# Patient Record
Sex: MALE | Race: WHITE | NOT HISPANIC OR LATINO | URBAN - METROPOLITAN AREA
[De-identification: names, ages, dates, MRNs, and addresses within clinical notes are randomized per-mention and may not be internally consistent; named-entity substitution may affect disease eponyms.]

---

## 2017-12-11 ENCOUNTER — INPATIENT (INPATIENT)
Facility: HOSPITAL | Age: 82
LOS: 2 days | Discharge: NURSING, REHAB OR LONG-TERM CARE | End: 2017-12-14
Attending: INTERNAL MEDICINE | Admitting: INTERNAL MEDICINE
Payer: MEDICARE

## 2017-12-11 VITALS
DIASTOLIC BLOOD PRESSURE: 86 MMHG | WEIGHT: 149.91 LBS | RESPIRATION RATE: 18 BRPM | HEART RATE: 71 BPM | OXYGEN SATURATION: 100 % | SYSTOLIC BLOOD PRESSURE: 150 MMHG | TEMPERATURE: 99 F | HEIGHT: 71 IN

## 2017-12-11 DIAGNOSIS — Z98.42 CATARACT EXTRACTION STATUS, LEFT EYE: Chronic | ICD-10-CM

## 2017-12-11 LAB
ANION GAP SERPL CALC-SCNC: 4 MMOL/L — LOW (ref 5–17)
BASOPHILS # BLD AUTO: 0.1 K/UL — SIGNIFICANT CHANGE UP (ref 0–0.2)
BASOPHILS NFR BLD AUTO: 0.6 % — SIGNIFICANT CHANGE UP (ref 0–2)
BUN SERPL-MCNC: 50 MG/DL — HIGH (ref 7–23)
CALCIUM SERPL-MCNC: 9.5 MG/DL — SIGNIFICANT CHANGE UP (ref 8.5–10.1)
CHLORIDE SERPL-SCNC: 109 MMOL/L — HIGH (ref 96–108)
CO2 SERPL-SCNC: 27 MMOL/L — SIGNIFICANT CHANGE UP (ref 22–31)
CREAT SERPL-MCNC: 1.43 MG/DL — HIGH (ref 0.5–1.3)
EOSINOPHIL # BLD AUTO: 0 K/UL — SIGNIFICANT CHANGE UP (ref 0–0.5)
EOSINOPHIL NFR BLD AUTO: 0.3 % — SIGNIFICANT CHANGE UP (ref 0–6)
GLUCOSE SERPL-MCNC: 120 MG/DL — HIGH (ref 70–99)
HCT VFR BLD CALC: 38.1 % — LOW (ref 39–50)
HGB BLD-MCNC: 12.7 G/DL — LOW (ref 13–17)
INR BLD: 1.14 RATIO — SIGNIFICANT CHANGE UP (ref 0.88–1.16)
LYMPHOCYTES # BLD AUTO: 1.9 K/UL — SIGNIFICANT CHANGE UP (ref 1–3.3)
LYMPHOCYTES # BLD AUTO: 20.6 % — SIGNIFICANT CHANGE UP (ref 13–44)
MCHC RBC-ENTMCNC: 32.1 PG — SIGNIFICANT CHANGE UP (ref 27–34)
MCHC RBC-ENTMCNC: 33.4 GM/DL — SIGNIFICANT CHANGE UP (ref 32–36)
MCV RBC AUTO: 96.1 FL — SIGNIFICANT CHANGE UP (ref 80–100)
MONOCYTES # BLD AUTO: 0.7 K/UL — SIGNIFICANT CHANGE UP (ref 0–0.9)
MONOCYTES NFR BLD AUTO: 7.8 % — SIGNIFICANT CHANGE UP (ref 2–14)
NEUTROPHILS # BLD AUTO: 6.5 K/UL — SIGNIFICANT CHANGE UP (ref 1.8–7.4)
NEUTROPHILS NFR BLD AUTO: 70.8 % — SIGNIFICANT CHANGE UP (ref 43–77)
PLATELET # BLD AUTO: 232 K/UL — SIGNIFICANT CHANGE UP (ref 150–400)
POTASSIUM SERPL-MCNC: 5.2 MMOL/L — SIGNIFICANT CHANGE UP (ref 3.5–5.3)
POTASSIUM SERPL-SCNC: 5.2 MMOL/L — SIGNIFICANT CHANGE UP (ref 3.5–5.3)
PROTHROM AB SERPL-ACNC: 12.4 SEC — SIGNIFICANT CHANGE UP (ref 9.8–12.7)
RBC # BLD: 3.96 M/UL — LOW (ref 4.2–5.8)
RBC # FLD: 13 % — SIGNIFICANT CHANGE UP (ref 10.3–14.5)
SODIUM SERPL-SCNC: 140 MMOL/L — SIGNIFICANT CHANGE UP (ref 135–145)
WBC # BLD: 9.1 K/UL — SIGNIFICANT CHANGE UP (ref 3.8–10.5)
WBC # FLD AUTO: 9.1 K/UL — SIGNIFICANT CHANGE UP (ref 3.8–10.5)

## 2017-12-11 PROCEDURE — 71111 X-RAY EXAM RIBS/CHEST4/> VWS: CPT | Mod: 26

## 2017-12-11 PROCEDURE — 76377 3D RENDER W/INTRP POSTPROCES: CPT | Mod: 26

## 2017-12-11 PROCEDURE — 70486 CT MAXILLOFACIAL W/O DYE: CPT | Mod: 26

## 2017-12-11 PROCEDURE — 99285 EMERGENCY DEPT VISIT HI MDM: CPT

## 2017-12-11 PROCEDURE — 72190 X-RAY EXAM OF PELVIS: CPT | Mod: 26

## 2017-12-11 PROCEDURE — 93010 ELECTROCARDIOGRAM REPORT: CPT

## 2017-12-11 PROCEDURE — 70450 CT HEAD/BRAIN W/O DYE: CPT | Mod: 26

## 2017-12-11 NOTE — ED ADULT NURSE REASSESSMENT NOTE - GENERAL PATIENT STATE
comfortable appearance/smiling/interactive/cooperative
cooperative/comfortable appearance/family/SO at bedside

## 2017-12-11 NOTE — ED ADULT NURSE NOTE - OBJECTIVE STATEMENT
presents to the ED s/p 2 falls; saturday/sunday. sent by PMD for further evaluation. c/o left sided rib pain. denies syncope. states that he lost his balance and fell. denies anticoagulation.

## 2017-12-11 NOTE — ED ADULT NURSE REASSESSMENT NOTE - COMFORT CARE
9pm rounding complete, no other assistance needed
meal provided/po fluids offered/ham sandwich; gingerale
hourly rounding completed/repositioned/side rails up
repositioned/meal provided/po fluids offered/side rails down
plan of care explained/wait time explained

## 2017-12-11 NOTE — ED ADULT TRIAGE NOTE - CHIEF COMPLAINT QUOTE
Patient comes to ED for fall on saturday. pt tripped and fell, denies LOC. pt complaining of left sided pain. denies blood thinner

## 2017-12-11 NOTE — ED PROVIDER NOTE - NS_ ATTENDINGSCRIBEDETAILS _ED_A_ED_FT
I, Fan Alvarez MD,  performed the initial face to face bedside interview with this patient regarding history of present illness, review of symptoms and relevant past medical, social and family history.  I completed an independent physical examination.    The history, relevant review of systems, past medical and surgical history, medical decision making, and physical examination was documented by the scribe in my presence and I attest to the accuracy of the documentation.

## 2017-12-11 NOTE — ED ADULT NURSE REASSESSMENT NOTE - NS ED NURSE REASSESS COMMENT FT1
MTastephon Alonzo at bedside, EKG to be performed.
Reinforced numerous times with pt and wife that case management will be at bedside when done addressing previous patients. Not very receptive, will continue to educate and reinforce.

## 2017-12-11 NOTE — ED PROVIDER NOTE - MUSCULOSKELETAL, MLM
Spine appears normal, range of motion is not limited, no muscle or joint tenderness. +tenderness to palpitation to left lower ribs

## 2017-12-12 DIAGNOSIS — E78.00 PURE HYPERCHOLESTEROLEMIA, UNSPECIFIED: ICD-10-CM

## 2017-12-12 DIAGNOSIS — I10 ESSENTIAL (PRIMARY) HYPERTENSION: ICD-10-CM
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
## 2017-12-12 LAB
ALBUMIN SERPL ELPH-MCNC: 3 G/DL — LOW (ref 3.3–5)
ALP SERPL-CCNC: 103 U/L — SIGNIFICANT CHANGE UP (ref 40–120)
ALT FLD-CCNC: 21 U/L — SIGNIFICANT CHANGE UP (ref 12–78)
ANION GAP SERPL CALC-SCNC: 8 MMOL/L — SIGNIFICANT CHANGE UP (ref 5–17)
AST SERPL-CCNC: 65 U/L — HIGH (ref 15–37)
BASOPHILS # BLD AUTO: 0.1 K/UL — SIGNIFICANT CHANGE UP (ref 0–0.2)
BASOPHILS NFR BLD AUTO: 0.8 % — SIGNIFICANT CHANGE UP (ref 0–2)
BILIRUB SERPL-MCNC: 0.8 MG/DL — SIGNIFICANT CHANGE UP (ref 0.2–1.2)
BUN SERPL-MCNC: 43 MG/DL — HIGH (ref 7–23)
CALCIUM SERPL-MCNC: 9.2 MG/DL — SIGNIFICANT CHANGE UP (ref 8.5–10.1)
CHLORIDE SERPL-SCNC: 111 MMOL/L — HIGH (ref 96–108)
CO2 SERPL-SCNC: 25 MMOL/L — SIGNIFICANT CHANGE UP (ref 22–31)
CREAT SERPL-MCNC: 1.19 MG/DL — SIGNIFICANT CHANGE UP (ref 0.5–1.3)
EOSINOPHIL # BLD AUTO: 0.1 K/UL — SIGNIFICANT CHANGE UP (ref 0–0.5)
EOSINOPHIL NFR BLD AUTO: 1.1 % — SIGNIFICANT CHANGE UP (ref 0–6)
GLUCOSE SERPL-MCNC: 91 MG/DL — SIGNIFICANT CHANGE UP (ref 70–99)
HCT VFR BLD CALC: 35.5 % — LOW (ref 39–50)
HGB BLD-MCNC: 11.8 G/DL — LOW (ref 13–17)
LYMPHOCYTES # BLD AUTO: 1.6 K/UL — SIGNIFICANT CHANGE UP (ref 1–3.3)
LYMPHOCYTES # BLD AUTO: 22.4 % — SIGNIFICANT CHANGE UP (ref 13–44)
MAGNESIUM SERPL-MCNC: 1.9 MG/DL — SIGNIFICANT CHANGE UP (ref 1.6–2.6)
MCHC RBC-ENTMCNC: 32.5 PG — SIGNIFICANT CHANGE UP (ref 27–34)
MCHC RBC-ENTMCNC: 33.1 GM/DL — SIGNIFICANT CHANGE UP (ref 32–36)
MCV RBC AUTO: 98 FL — SIGNIFICANT CHANGE UP (ref 80–100)
MONOCYTES # BLD AUTO: 0.6 K/UL — SIGNIFICANT CHANGE UP (ref 0–0.9)
MONOCYTES NFR BLD AUTO: 8.9 % — SIGNIFICANT CHANGE UP (ref 2–14)
NEUTROPHILS # BLD AUTO: 4.6 K/UL — SIGNIFICANT CHANGE UP (ref 1.8–7.4)
NEUTROPHILS NFR BLD AUTO: 66.9 % — SIGNIFICANT CHANGE UP (ref 43–77)
PLATELET # BLD AUTO: 221 K/UL — SIGNIFICANT CHANGE UP (ref 150–400)
POTASSIUM SERPL-MCNC: 4.4 MMOL/L — SIGNIFICANT CHANGE UP (ref 3.5–5.3)
POTASSIUM SERPL-SCNC: 4.4 MMOL/L — SIGNIFICANT CHANGE UP (ref 3.5–5.3)
PROT SERPL-MCNC: 6.8 GM/DL — SIGNIFICANT CHANGE UP (ref 6–8.3)
RBC # BLD: 3.62 M/UL — LOW (ref 4.2–5.8)
RBC # FLD: 13 % — SIGNIFICANT CHANGE UP (ref 10.3–14.5)
SODIUM SERPL-SCNC: 144 MMOL/L — SIGNIFICANT CHANGE UP (ref 135–145)
WBC # BLD: 6.9 K/UL — SIGNIFICANT CHANGE UP (ref 3.8–10.5)
WBC # FLD AUTO: 6.9 K/UL — SIGNIFICANT CHANGE UP (ref 3.8–10.5)

## 2017-12-12 PROCEDURE — 70553 MRI BRAIN STEM W/O & W/DYE: CPT | Mod: 26

## 2017-12-12 PROCEDURE — 72141 MRI NECK SPINE W/O DYE: CPT | Mod: 26

## 2017-12-12 RX ORDER — ONDANSETRON 8 MG/1
4 TABLET, FILM COATED ORAL EVERY 6 HOURS
Qty: 0 | Refills: 0 | Status: DISCONTINUED | OUTPATIENT
Start: 2017-12-12 | End: 2017-12-14

## 2017-12-12 RX ORDER — SENNA PLUS 8.6 MG/1
2 TABLET ORAL AT BEDTIME
Qty: 0 | Refills: 0 | Status: DISCONTINUED | OUTPATIENT
Start: 2017-12-12 | End: 2017-12-14

## 2017-12-12 RX ORDER — DOCUSATE SODIUM 100 MG
100 CAPSULE ORAL THREE TIMES A DAY
Qty: 0 | Refills: 0 | Status: DISCONTINUED | OUTPATIENT
Start: 2017-12-12 | End: 2017-12-14

## 2017-12-12 RX ORDER — METOPROLOL TARTRATE 50 MG
100 TABLET ORAL DAILY
Qty: 0 | Refills: 0 | Status: DISCONTINUED | OUTPATIENT
Start: 2017-12-12 | End: 2017-12-14

## 2017-12-12 RX ORDER — SODIUM CHLORIDE 9 MG/ML
1000 INJECTION INTRAMUSCULAR; INTRAVENOUS; SUBCUTANEOUS
Qty: 0 | Refills: 0 | Status: DISCONTINUED | OUTPATIENT
Start: 2017-12-12 | End: 2017-12-13

## 2017-12-12 RX ORDER — LISINOPRIL 2.5 MG/1
40 TABLET ORAL DAILY
Qty: 0 | Refills: 0 | Status: DISCONTINUED | OUTPATIENT
Start: 2017-12-12 | End: 2017-12-14

## 2017-12-12 RX ORDER — AMLODIPINE BESYLATE 2.5 MG/1
10 TABLET ORAL DAILY
Qty: 0 | Refills: 0 | Status: DISCONTINUED | OUTPATIENT
Start: 2017-12-12 | End: 2017-12-14

## 2017-12-12 RX ORDER — ACETAMINOPHEN 500 MG
650 TABLET ORAL EVERY 6 HOURS
Qty: 0 | Refills: 0 | Status: DISCONTINUED | OUTPATIENT
Start: 2017-12-12 | End: 2017-12-14

## 2017-12-12 RX ADMIN — LISINOPRIL 40 MILLIGRAM(S): 2.5 TABLET ORAL at 05:45

## 2017-12-12 RX ADMIN — Medication 100 MILLIGRAM(S): at 05:45

## 2017-12-12 RX ADMIN — AMLODIPINE BESYLATE 10 MILLIGRAM(S): 2.5 TABLET ORAL at 05:45

## 2017-12-12 NOTE — H&P ADULT - HISTORY OF PRESENT ILLNESS
89 y/o M PMHx significant for HTN, and hyperlipidemia presents to the ED for further evaluation of recurrent mechanical falls at home. The patient had two recent falls at home each on 12/9 and 12/10 and each with falling onto his left side. Denies head trauma or LOC. The patient states that he uses a cane or walker to   ambulate but that he loses his balance often as has become unsteady on his "feet." In the ED the patient was   noted to have multiple abrasions and small lacerations on his legs and nose. Trauma w/u in the ED included   XRs of the B/L ribs, Chest, and Pelvis all unrevealing for an acute fracture. CT Head/Maxillofacial revealed -> 1)an irregular bony thickening and hyperostosis of the right superomedial orbital wall with adjacent stippled hyperdensity which may reflect a primary benign bony neoplasm versus meningioma with adjacent hyperostosis and 2) There is a small area of hypoattenuation within the adjacent right inferior frontal lobe  not present on the prior exam, may reflect developing secondary vasogenic edema.

## 2017-12-12 NOTE — H&P ADULT - ASSESSMENT
89 y/o M PMHx significant for HTN, and hyperlipidemia presents to the ED for further evaluation of recurrent   mechanical falls at home. The patient had two recent falls at home each on 12/9 and 12/10 and each with   falling onto his left side. Denies head trauma or LOC. The patient states that he uses a cane or walker to   ambulate but that he loses his balance often as has become unsteady on his "feet." In the ED the patient was   noted to have multiple abrasions and small lacerations on his legs and nose. Trauma w/u in the ED included   XRs of the B/L ribs, Chest, and Pelvis all unrevealing for an acute fracture. CT Head/Maxillofacial revealed -> 1)an irregular bony thickening and hyperostosis of the right superomedial orbital wall with adjacent stippled hyperdensity which may reflect a primary benign bony neoplasm versus meningioma with adjacent hyperostosis and 2) There is a small area of hypoattenuation within the adjacent right inferior frontal lobe  not present on the prior exam, may reflect developing secondary vasogenic edema. 89 y/o M PMHx significant for HTN, and hyperlipidemia presents to the ED for further evaluation of recurrent   mechanical falls at home. The patient had two recent falls at home each on 12/9 and 12/10 and each with   falling onto his left side. Denies head trauma or LOC. The patient states that he uses a cane or walker to   ambulate but that he loses his balance often as has become unsteady on his "feet." In the ED the patient was   noted to have multiple abrasions and small lacerations on his legs and nose. Trauma w/u in the ED included   XRs of the B/L ribs, Chest, and Pelvis all unrevealing for an acute fracture. CT Head/Maxillofacial revealed -> 1)an irregular bony thickening and hyperostosis of the right superomedial orbital wall with adjacent stippled hyperdensity which may reflect a primary benign bony neoplasm versus meningioma with adjacent hyperostosis and 2) There is a small area of hypoattenuation within the adjacent right inferior frontal lobe  not present on the prior exam, may reflect developing secondary vasogenic edema.    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 Amlodipine 10mg po daily  ~cont. Lisinopril 40mg po daily  ~cont. Metoprolol XL 100mg po daily    4)Vte ppx  ~cont. SCDs for now

## 2017-12-12 NOTE — PHYSICAL THERAPY INITIAL EVALUATION ADULT - PHYSICAL ASSIST/NONPHYSICAL ASSIST: GAIT, REHAB EVAL
1 person assist/LOB noted x3 w/ posterior lean. ? decreased proprioception of LLE. min A to maintain midline following LOB/nonverbal cues (demo/gestures)

## 2017-12-12 NOTE — CONSULT NOTE ADULT - SUBJECTIVE AND OBJECTIVE BOX
HPI:  89 y/o M PMHx significant for HTN, and hyperlipidemia presents to the ED for further evaluation of recurrent mechanical falls at home. The patient had two recent falls at home each on 12/9 and 12/10 and each with falling onto his left side. Denies head trauma or LOC. The patient states that he uses a cane or walker to   ambulate but that he loses his balance often as has become unsteady on his "feet." In the ED the patient was   noted to have multiple abrasions and small lacerations on his legs and nose. Trauma w/u in the ED included   XRs of the B/L ribs, Chest, and Pelvis all unrevealing for an acute fracture. CT Head/Maxillofacial revealed -> 1)an irregular bony thickening and hyperostosis of the right superomedial orbital wall with adjacent stippled hyperdensity which may reflect a primary benign bony neoplasm versus meningioma with adjacent hyperostosis and 2) There is a small area of hypoattenuation within the adjacent right inferior frontal lobe  not present on the prior exam, may reflect developing secondary vasogenic edema. (12 Dec 2017 00:06)      PAST MEDICAL & SURGICAL HISTORY:  Hypercholesterolemia  Hypertension  Cataract extraction status of eye, left      Allergies    No Known Allergies    Intolerances        MEDICATIONS  (STANDING):  amLODIPine   Tablet 10 milliGRAM(s) Oral daily  lisinopril 40 milliGRAM(s) Oral daily  metoprolol succinate  milliGRAM(s) Oral daily    MEDICATIONS  (PRN):  acetaminophen   Tablet 650 milliGRAM(s) Oral every 6 hours PRN For Temp greater than 38 C (100.4 F)  acetaminophen   Tablet. 650 milliGRAM(s) Oral every 6 hours PRN Mild Pain (1 - 3)  docusate sodium 100 milliGRAM(s) Oral three times a day PRN Constipation  ondansetron Injectable 4 milliGRAM(s) IV Push every 6 hours PRN Nausea  senna 2 Tablet(s) Oral at bedtime PRN Constipation      SOCIAL HISTORY:    FAMILY HISTORY:  No pertinent family history in first degree relatives      Vital Signs Last 24 Hrs  T(C): 37 (12 Dec 2017 03:04), Max: 37.2 (11 Dec 2017 15:13)  T(F): 98.6 (12 Dec 2017 03:04), Max: 99 (11 Dec 2017 15:13)  HR: 71 (12 Dec 2017 03:04) (68 - 75)  BP: 162/90 (12 Dec 2017 03:04) (127/79 - 162/90)  BP(mean): --  RR: 16 (12 Dec 2017 03:04) (16 - 18)  SpO2: 100% (12 Dec 2017 03:04) (100% - 100%)    LABS:                        12.7   9.1   )-----------( 232      ( 11 Dec 2017 21:12 )             38.1     12-11    140  |  109<H>  |  50<H>  ----------------------------<  120<H>  5.2   |  27  |  1.43<H>    Ca    9.5      11 Dec 2017 21:12      PT/INR - ( 11 Dec 2017 21:12 )   PT: 12.4 sec;   INR: 1.14 ratio               RADIOLOGY & ADDITIONAL STUDIES:  ~~~~~~~~~~~~~~~~~~~~~~~~~~~~~~  EXAM:  CT BRAIN                            PROCEDURE DATE:  12/11/2017          INTERPRETATION:  Exam Date: 12/11/2017 3:55 PM    CT head without IV contrast    CLINICAL INFORMATION:  Altered mental status        TECHNIQUE: Contiguous axial sections were obtained through the head.     This scan was performed using automatic exposure control (radiation dose   reduction software) to obtain a diagnostic image quality scan with   patient dose as low as reasonably achievable.      COMPARISON: CT head January 29, 2014     FINDINGS:     There is irregular bony thickening and hyperostosis of the right   superomedial orbital wall with adjacent stippled hyperdensity, not   significantly changed in appearance since prior exam of 2014, may reflect   a primary benign bony neoplasm versus meningioma with adjacent   hyperostosis. There is a small area of hypoattenuation within the   adjacent right inferior frontal lobe  , not present on the prior exam,   may reflect developing secondary vasogenic edema. Further evaluation with   MRI brain with IV contrast is recommended.    Rest of the exam is unremarkable without evidence of acute infarct,   hemorrhage, mass effect, or hydrocephalus.    IMPRESSION:       Irregular bony thickening and hyperostosisof the right superomedial   orbital wall with adjacent stippled hyperdensity, not significantly   changed in appearance since prior exam of 2014, may reflect a primary   benign bony neoplasm versus meningioma with adjacent hyperostosis. There   is a small area of hypoattenuation within the adjacent right inferior   frontal lobe  , not present on the prior exam, may reflect developing   secondary vasogenic edema. Further evaluation with MRI brain with IV   contrast is recommended.    SHARMILA MENEDZ M.D., ATTENDING RADIOLOGIST  This document has been electronically signed. Dec 11 2017  4:20PM HPI:  89 y/o M PMHx significant for HTN, and hyperlipidemia presents to the ED for further evaluation of recurrent mechanical falls at home. The patient had two recent falls at home each on 12/9 and 12/10 and each with falling onto his left side. Denies head trauma or LOC. The patient states that he uses a cane or walker to   ambulate but that he loses his balance often as has become unsteady on his "feet." In the ED the patient was   noted to have multiple abrasions and small lacerations on his legs and nose. Trauma w/u in the ED included   XRs of the B/L ribs, Chest, and Pelvis all unrevealing for an acute fracture. CT Head/Maxillofacial revealed -> 1)an irregular bony thickening and hyperostosis of the right superomedial orbital wall with adjacent stippled hyperdensity which may reflect a primary benign bony neoplasm versus meningioma with adjacent hyperostosis and 2) There is a small area of hypoattenuation within the adjacent right inferior frontal lobe  not present on the prior exam, may reflect developing secondary vasogenic edema. (12 Dec 2017 00:06)  Pt seen and examined states he does have a difficulty handling things at times, and does drop some objects, denies any b/b incontinence.     PAST MEDICAL & SURGICAL HISTORY:  Hypercholesterolemia  Hypertension  Cataract extraction status of eye, left      Allergies    No Known Allergies    Intolerances        MEDICATIONS  (STANDING):  amLODIPine   Tablet 10 milliGRAM(s) Oral daily  lisinopril 40 milliGRAM(s) Oral daily  metoprolol succinate  milliGRAM(s) Oral daily    MEDICATIONS  (PRN):  acetaminophen   Tablet 650 milliGRAM(s) Oral every 6 hours PRN For Temp greater than 38 C (100.4 F)  acetaminophen   Tablet. 650 milliGRAM(s) Oral every 6 hours PRN Mild Pain (1 - 3)  docusate sodium 100 milliGRAM(s) Oral three times a day PRN Constipation  ondansetron Injectable 4 milliGRAM(s) IV Push every 6 hours PRN Nausea  senna 2 Tablet(s) Oral at bedtime PRN Constipation      SOCIAL HISTORY:    FAMILY HISTORY:  No pertinent family history in first degree relatives      Vital Signs Last 24 Hrs  T(C): 37 (12 Dec 2017 03:04), Max: 37.2 (11 Dec 2017 15:13)  T(F): 98.6 (12 Dec 2017 03:04), Max: 99 (11 Dec 2017 15:13)  HR: 71 (12 Dec 2017 03:04) (68 - 75)  BP: 162/90 (12 Dec 2017 03:04) (127/79 - 162/90)  BP(mean): --  RR: 16 (12 Dec 2017 03:04) (16 - 18)  SpO2: 100% (12 Dec 2017 03:04) (100% - 100%)    LABS:                        12.7   9.1   )-----------( 232      ( 11 Dec 2017 21:12 )             38.1     12-11    140  |  109<H>  |  50<H>  ----------------------------<  120<H>  5.2   |  27  |  1.43<H>    Ca    9.5      11 Dec 2017 21:12      PT/INR - ( 11 Dec 2017 21:12 )   PT: 12.4 sec;   INR: 1.14 ratio               RADIOLOGY & ADDITIONAL STUDIES:  ~~~~~~~~~~~~~~~~~~~~~~~~~~~~~~  EXAM:  CT BRAIN                            PROCEDURE DATE:  12/11/2017          INTERPRETATION:  Exam Date: 12/11/2017 3:55 PM    CT head without IV contrast    CLINICAL INFORMATION:  Altered mental status        TECHNIQUE: Contiguous axial sections were obtained through the head.     This scan was performed using automatic exposure control (radiation dose   reduction software) to obtain a diagnostic image quality scan with   patient dose as low as reasonably achievable.      COMPARISON: CT head January 29, 2014     FINDINGS:     There is irregular bony thickening and hyperostosis of the right   superomedial orbital wall with adjacent stippled hyperdensity, not   significantly changed in appearance since prior exam of 2014, may reflect   a primary benign bony neoplasm versus meningioma with adjacent   hyperostosis. There is a small area of hypoattenuation within the   adjacent right inferior frontal lobe  , not present on the prior exam,   may reflect developing secondary vasogenic edema. Further evaluation with   MRI brain with IV contrast is recommended.    Rest of the exam is unremarkable without evidence of acute infarct,   hemorrhage, mass effect, or hydrocephalus.    IMPRESSION:       Irregular bony thickening and hyperostosisof the right superomedial   orbital wall with adjacent stippled hyperdensity, not significantly   changed in appearance since prior exam of 2014, may reflect a primary   benign bony neoplasm versus meningioma with adjacent hyperostosis. There   is a small area of hypoattenuation within the adjacent right inferior   frontal lobe  , not present on the prior exam, may reflect developing   secondary vasogenic edema. Further evaluation with MRI brain with IV   contrast is recommended.    SHARMILA MENDEZ M.D., ATTENDING RADIOLOGIST  This document has been electronically signed. Dec 11 2017  4:20PM HPI:  87 y/o M PMHx significant for HTN, and hyperlipidemia presents to the ED for further evaluation of recurrent mechanical falls at home. The patient had two recent falls at home each on 12/9 and 12/10 and each with falling onto his left side. Denies head trauma or LOC. The patient states that he uses a cane or walker to   ambulate but that he loses his balance often as has become unsteady on his "feet." In the ED the patient was   noted to have multiple abrasions and small lacerations on his legs and nose. Trauma w/u in the ED included   XRs of the B/L ribs, Chest, and Pelvis all unrevealing for an acute fracture. CT Head/Maxillofacial revealed -> 1)an irregular bony thickening and hyperostosis of the right superomedial orbital wall with adjacent stippled hyperdensity which may reflect a primary benign bony neoplasm versus meningioma with adjacent hyperostosis and 2) There is a small area of hypoattenuation within the adjacent right inferior frontal lobe  not present on the prior exam, may reflect developing secondary vasogenic edema. (12 Dec 2017 00:06)  Pt seen and examined states he has been falling more frequently and he does have a difficulty handling things with his hands at times, admits to does drop some objects, denies any b/b incontinence. Pt states he does get paresthesias in his hands on/ off. Pt denies any headache, visual changes. Pt states he is not aware of any history of an abnormality in his brain.       PAST MEDICAL & SURGICAL HISTORY:  Hypercholesterolemia  Hypertension  Cataract extraction status of eye, left      Allergies    No Known Allergies    Intolerances        MEDICATIONS  (STANDING):  amLODIPine   Tablet 10 milliGRAM(s) Oral daily  lisinopril 40 milliGRAM(s) Oral daily  metoprolol succinate  milliGRAM(s) Oral daily    MEDICATIONS  (PRN):  acetaminophen   Tablet 650 milliGRAM(s) Oral every 6 hours PRN For Temp greater than 38 C (100.4 F)  acetaminophen   Tablet. 650 milliGRAM(s) Oral every 6 hours PRN Mild Pain (1 - 3)  docusate sodium 100 milliGRAM(s) Oral three times a day PRN Constipation  ondansetron Injectable 4 milliGRAM(s) IV Push every 6 hours PRN Nausea  senna 2 Tablet(s) Oral at bedtime PRN Constipation      SOCIAL HISTORY:    FAMILY HISTORY:  No pertinent family history in first degree relatives      Vital Signs Last 24 Hrs  T(C): 37 (12 Dec 2017 03:04), Max: 37.2 (11 Dec 2017 15:13)  T(F): 98.6 (12 Dec 2017 03:04), Max: 99 (11 Dec 2017 15:13)  HR: 71 (12 Dec 2017 03:04) (68 - 75)  BP: 162/90 (12 Dec 2017 03:04) (127/79 - 162/90)  BP(mean): --  RR: 16 (12 Dec 2017 03:04) (16 - 18)  SpO2: 100% (12 Dec 2017 03:04) (100% - 100%)    LABS:                        12.7   9.1   )-----------( 232      ( 11 Dec 2017 21:12 )             38.1     12-11    140  |  109<H>  |  50<H>  ----------------------------<  120<H>  5.2   |  27  |  1.43<H>    Ca    9.5      11 Dec 2017 21:12      PT/INR - ( 11 Dec 2017 21:12 )   PT: 12.4 sec;   INR: 1.14 ratio               RADIOLOGY & ADDITIONAL STUDIES:  ~~~~~~~~~~~~~~~~~~~~~~~~~~~~~~  EXAM:  CT BRAIN                            PROCEDURE DATE:  12/11/2017          INTERPRETATION:  Exam Date: 12/11/2017 3:55 PM    CT head without IV contrast    CLINICAL INFORMATION:  Altered mental status        TECHNIQUE: Contiguous axial sections were obtained through the head.     This scan was performed using automatic exposure control (radiation dose   reduction software) to obtain a diagnostic image quality scan with   patient dose as low as reasonably achievable.      COMPARISON: CT head January 29, 2014     FINDINGS:     There is irregular bony thickening and hyperostosis of the right   superomedial orbital wall with adjacent stippled hyperdensity, not   significantly changed in appearance since prior exam of 2014, may reflect   a primary benign bony neoplasm versus meningioma with adjacent   hyperostosis. There is a small area of hypoattenuation within the   adjacent right inferior frontal lobe  , not present on the prior exam,   may reflect developing secondary vasogenic edema. Further evaluation with   MRI brain with IV contrast is recommended.    Rest of the exam is unremarkable without evidence of acute infarct,   hemorrhage, mass effect, or hydrocephalus.    IMPRESSION:       Irregular bony thickening and hyperostosisof the right superomedial   orbital wall with adjacent stippled hyperdensity, not significantly   changed in appearance since prior exam of 2014, may reflect a primary   benign bony neoplasm versus meningioma with adjacent hyperostosis. There   is a small area of hypoattenuation within the adjacent right inferior   frontal lobe  , not present on the prior exam, may reflect developing   secondary vasogenic edema. Further evaluation with MRI brain with IV   contrast is recommended.    SHARMILA MENDEZ M.D., ATTENDING RADIOLOGIST  This document has been electronically signed. Dec 11 2017  4:20PM

## 2017-12-12 NOTE — PROGRESS NOTE ADULT - SUBJECTIVE AND OBJECTIVE BOX
CHIEF COMPLAINT/Diagnosis: frequent falls    SUBJECTIVE: no complaints    REVIEW OF SYSTEMS:    CONSTITUTIONAL: No weakness, fevers or chills  EYES/ENT: No visual changes;  No vertigo or throat pain   NECK: No pain or stiffness  RESPIRATORY: No cough, wheezing, hemoptysis; No shortness of breath  CARDIOVASCULAR: No chest pain or palpitations  GASTROINTESTINAL: No abdominal or epigastric pain. No nausea, vomiting, or hematemesis; No diarrhea or constipation. No melena or hematochezia.  GENITOURINARY: No dysuria, frequency or hematuria  NEUROLOGICAL: No numbness or weakness  SKIN: No itching, burning, rashes, or lesions   All other review of systems is negative unless indicated above    Vital Signs Last 24 Hrs  T(C): 37 (12 Dec 2017 16:20), Max: 37.2 (11 Dec 2017 18:48)  T(F): 98.6 (12 Dec 2017 16:20), Max: 98.9 (11 Dec 2017 18:48)  HR: 74 (12 Dec 2017 16:20) (68 - 75)  BP: 156/78 (12 Dec 2017 16:20) (127/79 - 162/90)  BP(mean): --  RR: 16 (12 Dec 2017 16:20) (16 - 18)  SpO2: 99% (12 Dec 2017 16:20) (98% - 100%)    I&O's Summary    12 Dec 2017 07:01  -  12 Dec 2017 17:02  --------------------------------------------------------  IN: 300 mL / OUT: 360 mL / NET: -60 mL        CAPILLARY BLOOD GLUCOSE          PHYSICAL EXAM:    Constitutional: NAD, awake and alert, well-developed  HEENT: PERR, EOMI, Normal Hearing, MMM  Neck: Soft and supple, No LAD, No JVD  Respiratory: Breath sounds are clear bilaterally, No wheezing, rales or rhonchi  Cardiovascular: S1 and S2, regular rate and rhythm, no Murmurs, gallops or rubs  Gastrointestinal: Bowel Sounds present, soft, nontender, nondistended, no guarding, no rebound  Extremities: No peripheral edema  Vascular: 2+ peripheral pulses  Neurological: A/O x 3, no focal deficits  Musculoskeletal: 5/5 strength b/l upper and lower extremities  Skin: No rashes    MEDICATIONS:  MEDICATIONS  (STANDING):  amLODIPine   Tablet 10 milliGRAM(s) Oral daily  lisinopril 40 milliGRAM(s) Oral daily  metoprolol succinate  milliGRAM(s) Oral daily      LABS: All Labs Reviewed:                        11.8   6.9   )-----------( 221      ( 12 Dec 2017 06:53 )             35.5     12-12    144  |  111<H>  |  43<H>  ----------------------------<  91  4.4   |  25  |  1.19    Ca    9.2      12 Dec 2017 06:53  Mg     1.9     12-12    TPro  6.8  /  Alb  3.0<L>  /  TBili  0.8  /  DBili  x   /  AST  65<H>  /  ALT  21  /  AlkPhos  103  12-12    PT/INR - ( 11 Dec 2017 21:12 )   PT: 12.4 sec;   INR: 1.14 ratio          Assessment	  87 y/o M PMHx significant for HTN, and hyperlipidemia presents to the ED for further evaluation of recurrent   mechanical falls at home. The patient had two recent falls at home each on 12/9 and 12/10 and each with   falling onto his left side.      CT Head/Maxillofacial revealed -> 1)an irregular bony thickening and hyperostosis of the right superomedial orbital wall with adjacent stippled hyperdensity which may reflect a primary benign bony neoplasm versus meningioma with adjacent hyperostosis and 2) There is a small area of hypoattenuation within the adjacent right inferior frontal lobe  not present on the prior exam, may reflect developing secondary vasogenic edema.    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 Amlodipine 10mg po daily  ~cont. Lisinopril 40mg po daily  ~cont. Metoprolol XL 100mg po daily    4-Vte ppx  ~cont. SCDs for now

## 2017-12-12 NOTE — PHYSICAL THERAPY INITIAL EVALUATION ADULT - PERTINENT HX OF CURRENT PROBLEM, REHAB EVAL
Pt. presents to ED for further evaluation of recurrent mechanical falls at home. Pt. had 2 recent falls at home on 12/9 and 12/10 each falling on L side. b/l xrays: no definite acute fractures of b/l ribs, severe degenerative changes of L shoulder. (-) pneumothorax; CTH: primary beningn bony neoplasm vs. meningioma, small area of hypoattenuation within R inferior frontal lobe

## 2017-12-12 NOTE — CONSULT NOTE ADULT - ASSESSMENT
87yo male s/p frequent falls with Right frontal Irregular bony thickening and hyperostosis of the right superomedial   orbital wall with adjacent stippled hyperdensity, ? meningioma     - recommend MRI of the brain with IV contrast   - 89yo male s/p frequent falls with Right frontal Irregular bony thickening and hyperostosis of the right superomedial   orbital wall with adjacent stippled hyperdensity, ? meningioma     - recommend MRI of the brain and orbits with IV contrast   - will cont to follow   - d/w Dr Ortiz 87yo male s/p frequent falls with Right frontal Irregular bony thickening and hyperostosis of the right superomedial   orbital wall with adjacent stippled hyperdensity, ? meningioma     - recommend MRI of the brain and orbits with IV contrast   - recommend MRI of cervical spine to r/o stenosis and myelopathy which could be causing his falls   - will cont to follow   - d/w Dr Ortiz

## 2017-12-12 NOTE — H&P ADULT - NSHPPHYSICALEXAM_GEN_ALL_CORE
Vital Signs Last 24 Hrs  T(C): 37.2 (11 Dec 2017 21:24), Max: 37.2 (11 Dec 2017 15:13)  T(F): 98.9 (11 Dec 2017 21:24), Max: 99 (11 Dec 2017 15:13)  HR: 68 (11 Dec 2017 21:24) (68 - 75)  BP: 142/72 (11 Dec 2017 21:24) (127/79 - 150/86)  RR: 18 (11 Dec 2017 21:24) (18 - 18)  SpO2: 100% (11 Dec 2017 21:24) (100% - 100%)

## 2017-12-13 LAB
ANION GAP SERPL CALC-SCNC: 7 MMOL/L — SIGNIFICANT CHANGE UP (ref 5–17)
BUN SERPL-MCNC: 35 MG/DL — HIGH (ref 7–23)
CALCIUM SERPL-MCNC: 9.1 MG/DL — SIGNIFICANT CHANGE UP (ref 8.5–10.1)
CHLORIDE SERPL-SCNC: 112 MMOL/L — HIGH (ref 96–108)
CK SERPL-CCNC: 507 U/L — HIGH (ref 26–308)
CO2 SERPL-SCNC: 26 MMOL/L — SIGNIFICANT CHANGE UP (ref 22–31)
CREAT SERPL-MCNC: 1.04 MG/DL — SIGNIFICANT CHANGE UP (ref 0.5–1.3)
GLUCOSE SERPL-MCNC: 114 MG/DL — HIGH (ref 70–99)
POTASSIUM SERPL-MCNC: 4.3 MMOL/L — SIGNIFICANT CHANGE UP (ref 3.5–5.3)
POTASSIUM SERPL-SCNC: 4.3 MMOL/L — SIGNIFICANT CHANGE UP (ref 3.5–5.3)
SODIUM SERPL-SCNC: 145 MMOL/L — SIGNIFICANT CHANGE UP (ref 135–145)

## 2017-12-13 RX ORDER — SODIUM CHLORIDE 9 MG/ML
1000 INJECTION INTRAMUSCULAR; INTRAVENOUS; SUBCUTANEOUS
Qty: 0 | Refills: 0 | Status: DISCONTINUED | OUTPATIENT
Start: 2017-12-13 | End: 2017-12-14

## 2017-12-13 RX ADMIN — SODIUM CHLORIDE 85 MILLILITER(S): 9 INJECTION INTRAMUSCULAR; INTRAVENOUS; SUBCUTANEOUS at 16:21

## 2017-12-13 RX ADMIN — LISINOPRIL 40 MILLIGRAM(S): 2.5 TABLET ORAL at 06:36

## 2017-12-13 RX ADMIN — AMLODIPINE BESYLATE 10 MILLIGRAM(S): 2.5 TABLET ORAL at 06:36

## 2017-12-13 NOTE — PROGRESS NOTE ADULT - SUBJECTIVE AND OBJECTIVE BOX
CHIEF COMPLAINT/Diagnosis: frequent falls/ meningioma    SUBJECTIVE: no complaints    REVIEW OF SYSTEMS:    CONSTITUTIONAL: No weakness, fevers or chills  EYES/ENT: No visual changes;  No vertigo or throat pain   NECK: No pain or stiffness  RESPIRATORY: No cough, wheezing, hemoptysis; No shortness of breath  CARDIOVASCULAR: No chest pain or palpitations  GASTROINTESTINAL: No abdominal or epigastric pain. No nausea, vomiting, or hematemesis; No diarrhea or constipation. No melena or hematochezia.  GENITOURINARY: No dysuria, frequency or hematuria  NEUROLOGICAL: No numbness or weakness  SKIN: No itching, burning, rashes, or lesions   All other review of systems is negative unless indicated above    Vital Signs Last 24 Hrs  T(C): 36.5 (13 Dec 2017 12:15), Max: 37 (12 Dec 2017 16:20)  T(F): 97.7 (13 Dec 2017 12:15), Max: 98.6 (12 Dec 2017 16:20)  HR: 66 (13 Dec 2017 12:15) (55 - 74)  BP: 141/70 (13 Dec 2017 12:15) (141/70 - 156/78)  BP(mean): --  RR: 18 (13 Dec 2017 12:15) (16 - 18)  SpO2: 94% (13 Dec 2017 12:15) (94% - 99%)    I&O's Summary    12 Dec 2017 07:01  -  13 Dec 2017 07:00  --------------------------------------------------------  IN: 300 mL / OUT: 960 mL / NET: -660 mL        CAPILLARY BLOOD GLUCOSE          PHYSICAL EXAM:    Constitutional: NAD, awake and alert, well-developed  HEENT: PERR, EOMI, Normal Hearing, MMM  Neck: Soft and supple, No LAD, No JVD  Respiratory: Breath sounds are clear bilaterally, No wheezing, rales or rhonchi  Cardiovascular: S1 and S2, regular rate and rhythm, no Murmurs, gallops or rubs  Gastrointestinal: Bowel Sounds present, soft, nontender, nondistended, no guarding, no rebound  Extremities: No peripheral edema  Vascular: 2+ peripheral pulses  Neurological: A/O x 3, no focal deficits  Musculoskeletal: 5/5 strength b/l upper and lower extremities  Skin: No rashes    MEDICATIONS:  MEDICATIONS  (STANDING):  amLODIPine   Tablet 10 milliGRAM(s) Oral daily  lisinopril 40 milliGRAM(s) Oral daily  metoprolol succinate  milliGRAM(s) Oral daily      LABS: All Labs Reviewed:                        11.8   6.9   )-----------( 221      ( 12 Dec 2017 06:53 )             35.5     12-13    145  |  112<H>  |  35<H>  ----------------------------<  114<H>  4.3   |  26  |  1.04    Ca    9.1      13 Dec 2017 07:49  Mg     1.9     12-12    TPro  6.8  /  Alb  3.0<L>  /  TBili  0.8  /  DBili  x   /  AST  65<H>  /  ALT  21  /  AlkPhos  103  12-12    PT/INR - ( 11 Dec 2017 21:12 )   PT: 12.4 sec;   INR: 1.14 ratio           CARDIAC MARKERS ( 13 Dec 2017 07:49 )  x     / x     / 507 U/L / x     / x          Assessment	  87 y/o M PMHx significant for HTN, and hyperlipidemia presents to the ED for further evaluation of recurrent   mechanical falls at home. The patient had two recent falls at home each on 12/9 and 12/10 and each with   falling onto his left side.      CT Head/Maxillofacial revealed -> 1)an irregular bony thickening and hyperostosis of the right superomedial orbital wall with adjacent stippled hyperdensity which may reflect a primary benign bony neoplasm versus meningioma with adjacent hyperostosis and 2) There is a small area of hypoattenuation within the adjacent right inferior frontal lobe  not present on the prior exam, may reflect developing secondary vasogenic edema.    1-Freqent Falls in the Elderly  -physical therapy consult  -iv hydration NS@85cc/hr    2-CT Head revealed a primary benign bony neoplasm versus meningioma and a small area of hypoattenuation within the adjacent right inferior frontal lobe  -MRI Brain- meningioma  -neurosurgery consult apprecaited- f/u outpatient, no acute neurosurgical intervention at this time    3- HTN  ~cont. Amlodipine 10mg po daily  ~cont. Lisinopril 40mg po daily  ~cont. Metoprolol XL 100mg po daily    4-Vte ppx  ~cont. SCDs for now    dispo[place] CHIEF COMPLAINT/Diagnosis: frequent falls/ meningioma    SUBJECTIVE: no complaints    REVIEW OF SYSTEMS:    CONSTITUTIONAL: No weakness, fevers or chills  EYES/ENT: No visual changes;  No vertigo or throat pain   NECK: No pain or stiffness  RESPIRATORY: No cough, wheezing, hemoptysis; No shortness of breath  CARDIOVASCULAR: No chest pain or palpitations  GASTROINTESTINAL: No abdominal or epigastric pain. No nausea, vomiting, or hematemesis; No diarrhea or constipation. No melena or hematochezia.  GENITOURINARY: No dysuria, frequency or hematuria  NEUROLOGICAL: No numbness or weakness  SKIN: No itching, burning, rashes, or lesions   All other review of systems is negative unless indicated above    Vital Signs Last 24 Hrs  T(C): 36.5 (13 Dec 2017 12:15), Max: 37 (12 Dec 2017 16:20)  T(F): 97.7 (13 Dec 2017 12:15), Max: 98.6 (12 Dec 2017 16:20)  HR: 66 (13 Dec 2017 12:15) (55 - 74)  BP: 141/70 (13 Dec 2017 12:15) (141/70 - 156/78)  BP(mean): --  RR: 18 (13 Dec 2017 12:15) (16 - 18)  SpO2: 94% (13 Dec 2017 12:15) (94% - 99%)    I&O's Summary    12 Dec 2017 07:01  -  13 Dec 2017 07:00  --------------------------------------------------------  IN: 300 mL / OUT: 960 mL / NET: -660 mL        CAPILLARY BLOOD GLUCOSE          PHYSICAL EXAM:    Constitutional: NAD, awake and alert, well-developed  HEENT: PERR, EOMI, Normal Hearing, MMM  Neck: Soft and supple, No LAD, No JVD  Respiratory: Breath sounds are clear bilaterally, No wheezing, rales or rhonchi  Cardiovascular: S1 and S2, regular rate and rhythm, no Murmurs, gallops or rubs  Gastrointestinal: Bowel Sounds present, soft, nontender, nondistended, no guarding, no rebound  Extremities: No peripheral edema  Vascular: 2+ peripheral pulses  Neurological: A/O x 3, no focal deficits  Musculoskeletal: 5/5 strength b/l upper and lower extremities  Skin: No rashes    MEDICATIONS:  MEDICATIONS  (STANDING):  amLODIPine   Tablet 10 milliGRAM(s) Oral daily  lisinopril 40 milliGRAM(s) Oral daily  metoprolol succinate  milliGRAM(s) Oral daily      LABS: All Labs Reviewed:                        11.8   6.9   )-----------( 221      ( 12 Dec 2017 06:53 )             35.5     12-13    145  |  112<H>  |  35<H>  ----------------------------<  114<H>  4.3   |  26  |  1.04    Ca    9.1      13 Dec 2017 07:49  Mg     1.9     12-12    TPro  6.8  /  Alb  3.0<L>  /  TBili  0.8  /  DBili  x   /  AST  65<H>  /  ALT  21  /  AlkPhos  103  12-12    PT/INR - ( 11 Dec 2017 21:12 )   PT: 12.4 sec;   INR: 1.14 ratio           CARDIAC MARKERS ( 13 Dec 2017 07:49 )  x     / x     / 507 U/L / x     / x          Assessment	  89 y/o M PMHx significant for HTN, and hyperlipidemia presents to the ED for further evaluation of recurrent   mechanical falls at home. The patient had two recent falls at home each on 12/9 and 12/10 and each with   falling onto his left side.      CT Head/Maxillofacial revealed -> 1)an irregular bony thickening and hyperostosis of the right superomedial orbital wall with adjacent stippled hyperdensity which may reflect a primary benign bony neoplasm versus meningioma with adjacent hyperostosis and 2) There is a small area of hypoattenuation within the adjacent right inferior frontal lobe  not present on the prior exam, may reflect developing secondary vasogenic edema.    1-Freqent Falls in the Elderly  -physical therapy consult  -iv hydration NS@85cc/hr    2-CT Head revealed a primary benign bony neoplasm versus meningioma and a small area of hypoattenuation within the adjacent right inferior frontal lobe  -MRI Brain- meningioma  -neurosurgery consult apprecaited- f/u outpatient, no acute neurosurgical intervention at this time    3- HTN  ~cont. Amlodipine 10mg po daily  ~cont. Lisinopril 40mg po daily  ~cont. Metoprolol XL 100mg po daily    4-Vte ppx  ~cont. SCDs for now    5-Rhabdomyalysis: secondary to falls  -'s  -c/w iv fluids       dispo: [place] in [place]

## 2017-12-13 NOTE — PROGRESS NOTE ADULT - ASSESSMENT
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 Degenerative cord impingement is seen at C3-4 through C6-7. Pt has negative Clayton's sign and no clonus appreciated, but most likely myelopathic and this is the reason for frequent falls. No acute neurosurgical intervention at this time. Pt should have rehab for strength and balance training, follow up as an outpatient to discuss surgical options.     Follow up as an outpatient     Re-consult as needed

## 2017-12-13 NOTE — PROGRESS NOTE ADULT - SUBJECTIVE AND OBJECTIVE BOX
HPI: 89 y/o man PMHx significant for HTN, and hyperlipidemia presents to the ED for further evaluation of recurrent mechanical falls at home. CT brain showed a suspicious lesion along the right orbital roof.  After further exam pt gave a history of increased clumsiness with his hands along with numbness in b/l hands. MRI C-Spine was ordered.    Vital Signs Last 24 Hrs  T(C): 36.7 (13 Dec 2017 05:14), Max: 37 (12 Dec 2017 16:20)  T(F): 98.1 (13 Dec 2017 05:14), Max: 98.6 (12 Dec 2017 16:20)  HR: 55 (13 Dec 2017 05:14) (55 - 74)  BP: 141/76 (13 Dec 2017 05:14) (141/76 - 156/78)  RR: 18 (13 Dec 2017 05:14) (16 - 18)  SpO2: 97% (13 Dec 2017 05:14) (97% - 99%)    MEDICATIONS  (STANDING):  amLODIPine   Tablet 10 milliGRAM(s) Oral daily  lisinopril 40 milliGRAM(s) Oral daily  metoprolol succinate  milliGRAM(s) Oral daily    MEDICATIONS  (PRN):  acetaminophen   Tablet 650 milliGRAM(s) Oral every 6 hours PRN For Temp greater than 38 C (100.4 F)  acetaminophen   Tablet. 650 milliGRAM(s) Oral every 6 hours PRN Mild Pain (1 - 3)  docusate sodium 100 milliGRAM(s) Oral three times a day PRN Constipation  ondansetron Injectable 4 milliGRAM(s) IV Push every 6 hours PRN Nausea  senna 2 Tablet(s) Oral at bedtime PRN Constipation    PHYSICAL EXAM:  Constitutional: awake and alert  HEENT: PERRLA, EOMI  Neck: Supple.  Respiratory: Breath sounds are clear bilaterally  Cardiovascular: S1 and S2, regular rhythm  Gastrointestinal: soft, nontender  Extremities:  no edema  Musculoskeletal: contracture left hand   Skin: No rashes    Neurological exam:  HF: A x O x 3. Appropriately interactive, normal affect. Speech fluent, No Aphasia or paraphasic errors. Naming /repetition intact   CN: PEARRL, EOMI, VFF, facial sensation normal, no NLFD, tongue midline  Motor: No pronator drift, Strength 5/5 in all 4 ext, normal bulk and tone, no tremor, rigidity or bradykinesia.    Sens: Intact to light touch  Reflexes: Clayton's negative, no clonus, toes down going   Coord:  No FNFA, dysmetria, JANICE intact   Gait/Balance: Not tested     LABS:                         11.8   6.9   )-----------( 221      ( 12 Dec 2017 06:53 )             35.5     12-13    145  |  112<H>  |  35<H>  ----------------------------<  114<H>  4.3   |  26  |  1.04    Ca    9.1      13 Dec 2017 07:49  Mg     1.9     12-12    TPro  6.8  /  Alb  3.0<L>  /  TBili  0.8  /  DBili  x   /  AST  65<H>  /  ALT  21  /  AlkPhos  103  12-12    LIVER FUNCTIONS - ( 12 Dec 2017 06:53 )  Alb: 3.0 g/dL / Pro: 6.8 gm/dL / ALK PHOS: 103 U/L / ALT: 21 U/L / AST: 65 U/L / GGT: x           RADIOLOGY:  MRI Brain:  2.1 x 1.0 x 1.5 cm meningioma with associated hyperostosis along the RIGHT orbital roof medially.   There is mild vasogenic edema in the adjacent underlying frontal lobe.  Minimal periventricular white matter ischemia is noted.   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     MRI C-Spine: Mild degenerative disc disease at C3-4 through C6-7 with narrowing of the BILATERAL C3-4 through C6-7 neural foramina due to uncovertebral spurring and facet osteophytic hypertrophy. Degenerative cord impingement is seen at C3-4 through C6-7

## 2017-12-14 ENCOUNTER — TRANSCRIPTION ENCOUNTER (OUTPATIENT)
Age: 82
End: 2017-12-14

## 2017-12-14 VITALS
HEART RATE: 71 BPM | DIASTOLIC BLOOD PRESSURE: 80 MMHG | TEMPERATURE: 98 F | RESPIRATION RATE: 18 BRPM | OXYGEN SATURATION: 98 % | SYSTOLIC BLOOD PRESSURE: 136 MMHG

## 2017-12-14 RX ORDER — DOCUSATE SODIUM 100 MG
1 CAPSULE ORAL
Qty: 0 | Refills: 0 | COMMUNITY
Start: 2017-12-14

## 2017-12-14 RX ORDER — ACETAMINOPHEN 500 MG
2 TABLET ORAL
Qty: 0 | Refills: 0 | COMMUNITY
Start: 2017-12-14

## 2017-12-14 RX ADMIN — Medication 100 MILLIGRAM(S): at 05:42

## 2017-12-14 RX ADMIN — AMLODIPINE BESYLATE 10 MILLIGRAM(S): 2.5 TABLET ORAL at 05:42

## 2017-12-14 RX ADMIN — LISINOPRIL 40 MILLIGRAM(S): 2.5 TABLET ORAL at 05:42

## 2017-12-14 NOTE — DISCHARGE NOTE ADULT - MEDICATION SUMMARY - MEDICATIONS TO TAKE
I will START or STAY ON the medications listed below when I get home from the hospital:    acetaminophen 325 mg oral tablet  -- 2 tab(s) by mouth every 6 hours, As needed, Mild Pain (1 - 3)  -- Indication: For pain     lisinopril 40 mg oral tablet  -- 1 tab(s) by mouth once a day  -- Indication: For Hypertension    Toprol- mg oral tablet, extended release  -- 1 tab(s) by mouth once a day  -- Indication: For Hypertension    Norvasc 10 mg oral tablet  -- 1 tab(s) by mouth once a day  -- Indication: For Hypertension    docusate sodium 100 mg oral capsule  -- 1 cap(s) by mouth 3 times a day, As needed, Constipation  -- Indication: For constipation prn    Vitamin B-12 1000 mcg oral tablet  -- 5 tab(s) by mouth once a day  -- Indication: For prophylaxis

## 2017-12-14 NOTE — DISCHARGE NOTE ADULT - PATIENT PORTAL LINK FT
“You can access the Follow[employer] Patient Portal, offered by [facility], by registering with the following website: [email or web address]”

## 2017-12-14 NOTE — DISCHARGE NOTE ADULT - HOSPITAL COURSE
Assessment	  89 y/o M PMHx significant for HTN, and hyperlipidemia presents to the ED for further evaluation of recurrent   mechanical falls at home. The patient had two recent falls at home each on 12/9 and 12/10 and each with   falling onto his left side.      CT Head/Maxillofacial revealed -> 1)an irregular bony thickening and hyperostosis of the right superomedial orbital wall with adjacent stippled hyperdensity which may reflect a primary benign bony neoplasm versus meningioma with adjacent hyperostosis and 2) There is a small area of hypoattenuation within the adjacent right inferior frontal lobe  not present on the prior exam, may reflect developing secondary vasogenic edema.    1-Freqent Falls in the Elderly  -physical therapy consult  -iv hydration NS@85cc/hr    2-CT Head revealed a primary benign bony neoplasm versus meningioma and a small area of hypoattenuation within the adjacent right inferior frontal lobe  -MRI Brain- meningioma  -neurosurgery consult apprecaited- f/u outpatient, no acute neurosurgical intervention at this time    3- HTN  ~cont. Amlodipine 10mg po daily  ~cont. Lisinopril 40mg po daily  ~cont. Metoprolol XL 100mg po daily    4-Vte ppx  ~cont. SCDs for now    5-Rhabdomyalysis: secondary to falls  -'s  -c/w iv fluids Vital Signs Last 24 Hrs  T(C): 36.8 (14 Dec 2017 11:06), Max: 37 (14 Dec 2017 05:21)  T(F): 98.3 (14 Dec 2017 11:06), Max: 98.6 (14 Dec 2017 05:21)  HR: 71 (14 Dec 2017 11:06) (70 - 74)  BP: 136/80 (14 Dec 2017 11:06) (136/80 - 146/74)  BP(mean): --  RR: 18 (14 Dec 2017 11:06) (18 - 18)  SpO2: 98% (14 Dec 2017 11:06) (96% - 98%)    HEENT:   pupils equal and reactive, EOMI, no oropharyngeal lesions, erythema, exudates, oral thrush    NECK:   supple, no carotid bruits, no palpable lymph nodes, no thyromegaly    CV:  +S1, +S2, regular, no murmurs or rubs    RESP:   lungs clear to auscultation bilaterally, no wheezing, rales, rhonchi, good air entry bilaterally    BREAST:  not examined    GI:  abdomen soft, non-tender, non-distended, normal BS, no bruits, no abdominal masses, no palpable masses    RECTAL:  not examined    :  not examined    MSK:   normal muscle tone, no atrophy, no rigidity, no contractions    EXT:   no clubbing, no cyanosis, no edema, no calf pain, swelling or erythema    VASCULAR:  pulses equal and symmetric in the upper and lower extremities    NEURO:  AAOX3, no focal neurological deficits, follows all commands, able to move extremities spontaneously    SKIN:  no ulcers, lesions or rashes    13 Dec 2017 07:49    145    |  112    |  35     ----------------------------<  114    4.3     |  26     |  1.04     Ca    9.1        13 Dec 2017 07:49    CARDIAC MARKERS ( 13 Dec 2017 07:49 )  x     / x     / 507 U/L / x     / x            Hospital Course:    87 y/o M PMHx significant for HTN, and hyperlipidemia presents to the ED for further evaluation of recurrent   mechanical falls at home. The patient had two recent falls at home each on 12/9 and 12/10 and each with falling onto his left side.     CT Head/Maxillofacial revealed -> 1)an irregular bony thickening and hyperostosis of the right superomedial orbital wall with adjacent stippled hyperdensity which may reflect a primary benign bony neoplasm versus meningioma with adjacent hyperostosis and 2) There is a small area of hypoattenuation within the adjacent right inferior frontal lobe  not present on the prior exam, may reflect developing secondary vasogenic edema.    1-Freqent Falls in the Elderly  -physical therapy consult  -iv hydration NS@85cc/hr    2-CT Head revealed a primary benign bony neoplasm versus meningioma and a small area of hypoattenuation within the adjacent right inferior frontal lobe  -MRI Brain- meningioma  -neurosurgery consult apprecaited- f/u outpatient, no acute neurosurgical intervention at this time

## 2017-12-14 NOTE — DISCHARGE NOTE ADULT - CARE PROVIDER_API CALL
Billy Ortiz; PhD), Neurosurgery  [address] [facility]  [address]nd [place] [address]  Phone: [phone]  Fax: [phone]

## 2017-12-19 DIAGNOSIS — I10 ESSENTIAL (PRIMARY) HYPERTENSION: ICD-10-CM

## 2017-12-19 DIAGNOSIS — G93.6 CEREBRAL EDEMA: ICD-10-CM

## 2017-12-19 DIAGNOSIS — S80.811A ABRASION, RIGHT LOWER LEG, INITIAL ENCOUNTER: ICD-10-CM

## 2017-12-19 DIAGNOSIS — D32.0 BENIGN NEOPLASM OF CEREBRAL MENINGES: ICD-10-CM
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
## 2017-12-19 DIAGNOSIS — Z87.891 PERSONAL HISTORY OF NICOTINE DEPENDENCE: ICD-10-CM

## 2017-12-19 DIAGNOSIS — T79.6XXA TRAUMATIC ISCHEMIA OF MUSCLE, INITIAL ENCOUNTER: ICD-10-CM

## 2017-12-19 DIAGNOSIS — M50.01 CERVICAL DISC DISORDER WITH MYELOPATHY, HIGH CERVICAL REGION: ICD-10-CM

## 2017-12-19 DIAGNOSIS — S80.812A ABRASION, LEFT LOWER LEG, INITIAL ENCOUNTER: ICD-10-CM
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
## 2017-12-19 DIAGNOSIS — R29.6 REPEATED FALLS: ICD-10-CM

## 2017-12-19 DIAGNOSIS — S00.31XA ABRASION OF NOSE, INITIAL ENCOUNTER: ICD-10-CM

## 2017-12-19 DIAGNOSIS — E78.5 HYPERLIPIDEMIA, UNSPECIFIED: ICD-10-CM

## 2017-12-19 DIAGNOSIS — Z98.42 CATARACT EXTRACTION STATUS, LEFT EYE: ICD-10-CM

## 2018-12-22 NOTE — ED PROVIDER NOTE - CADM POA PRESS ULCER
1546 No IV access. Dr. Renetta Ferrer notified. PICC team notified at [address] 1030 K+ sent to lab. 1140 called lab due to no results. Lab did not notify preop of need for more blood. Lab notified they need to draw blood. K+ resulted @ 1253. No

## 2019-03-10 ENCOUNTER — INPATIENT (INPATIENT)
Facility: HOSPITAL | Age: 84
LOS: 29 days | Discharge: NURSING, REHAB OR LONG-TERM CARE | End: 2019-04-09
Attending: FAMILY MEDICINE | Admitting: FAMILY MEDICINE
Payer: MEDICARE

## 2019-03-10 VITALS
SYSTOLIC BLOOD PRESSURE: 125 MMHG | TEMPERATURE: 98 F | RESPIRATION RATE: 16 BRPM | HEIGHT: 69 IN | WEIGHT: 184.97 LBS | HEART RATE: 61 BPM | DIASTOLIC BLOOD PRESSURE: 62 MMHG | OXYGEN SATURATION: 100 %

## 2019-03-10 DIAGNOSIS — Z98.42 CATARACT EXTRACTION STATUS, LEFT EYE: Chronic | ICD-10-CM

## 2019-03-10 LAB
ABO RH CONFIRMATION: SIGNIFICANT CHANGE UP
ALBUMIN SERPL ELPH-MCNC: 2.6 G/DL — LOW (ref 3.3–5)
ALP SERPL-CCNC: 115 U/L — SIGNIFICANT CHANGE UP (ref 40–120)
ALT FLD-CCNC: 19 U/L — SIGNIFICANT CHANGE UP (ref 12–78)
ANION GAP SERPL CALC-SCNC: 11 MMOL/L — SIGNIFICANT CHANGE UP (ref 5–17)
APPEARANCE UR: ABNORMAL
APTT BLD: 29.9 SEC — SIGNIFICANT CHANGE UP (ref 27.5–36.3)
AST SERPL-CCNC: 41 U/L — HIGH (ref 15–37)
BACTERIA # UR AUTO: ABNORMAL
BASOPHILS # BLD AUTO: 0 K/UL — SIGNIFICANT CHANGE UP (ref 0–0.2)
BASOPHILS NFR BLD AUTO: 0 % — SIGNIFICANT CHANGE UP (ref 0–2)
BILIRUB SERPL-MCNC: 1.2 MG/DL — SIGNIFICANT CHANGE UP (ref 0.2–1.2)
BILIRUB UR-MCNC: NEGATIVE — SIGNIFICANT CHANGE UP
BLD GP AB SCN SERPL QL: SIGNIFICANT CHANGE UP
BUN SERPL-MCNC: 64 MG/DL — HIGH (ref 7–23)
CALCIUM SERPL-MCNC: 8.9 MG/DL — SIGNIFICANT CHANGE UP (ref 8.5–10.1)
CHLORIDE SERPL-SCNC: 107 MMOL/L — SIGNIFICANT CHANGE UP (ref 96–108)
CK SERPL-CCNC: 572 U/L — HIGH (ref 26–308)
CO2 SERPL-SCNC: 23 MMOL/L — SIGNIFICANT CHANGE UP (ref 22–31)
COLOR SPEC: YELLOW — SIGNIFICANT CHANGE UP
COMMENT - URINE: SIGNIFICANT CHANGE UP
CREAT SERPL-MCNC: 1.8 MG/DL — HIGH (ref 0.5–1.3)
DIFF PNL FLD: ABNORMAL
EOSINOPHIL # BLD AUTO: 0 K/UL — SIGNIFICANT CHANGE UP (ref 0–0.5)
EOSINOPHIL NFR BLD AUTO: 0 % — SIGNIFICANT CHANGE UP (ref 0–6)
EPI CELLS # UR: NEGATIVE — SIGNIFICANT CHANGE UP
GLUCOSE SERPL-MCNC: 98 MG/DL — SIGNIFICANT CHANGE UP (ref 70–99)
GLUCOSE UR QL: NEGATIVE MG/DL — SIGNIFICANT CHANGE UP
HCT VFR BLD CALC: 32.8 % — LOW (ref 39–50)
HGB BLD-MCNC: 11.1 G/DL — LOW (ref 13–17)
INR BLD: 1.71 RATIO — HIGH (ref 0.88–1.16)
KETONES UR-MCNC: NEGATIVE — SIGNIFICANT CHANGE UP
LACTATE SERPL-SCNC: 1.6 MMOL/L — SIGNIFICANT CHANGE UP (ref 0.7–2)
LEUKOCYTE ESTERASE UR-ACNC: ABNORMAL
LYMPHOCYTES # BLD AUTO: 0.73 K/UL — LOW (ref 1–3.3)
LYMPHOCYTES # BLD AUTO: 6 % — LOW (ref 13–44)
MANUAL SMEAR VERIFICATION: SIGNIFICANT CHANGE UP
MCHC RBC-ENTMCNC: 32.9 PG — SIGNIFICANT CHANGE UP (ref 27–34)
MCHC RBC-ENTMCNC: 33.8 GM/DL — SIGNIFICANT CHANGE UP (ref 32–36)
MCV RBC AUTO: 97.3 FL — SIGNIFICANT CHANGE UP (ref 80–100)
METAMYELOCYTES # FLD: 2 % — HIGH (ref 0–0)
MONOCYTES # BLD AUTO: 0.36 K/UL — SIGNIFICANT CHANGE UP (ref 0–0.9)
MONOCYTES NFR BLD AUTO: 3 % — SIGNIFICANT CHANGE UP (ref 2–14)
NEUTROPHILS # BLD AUTO: 10.76 K/UL — HIGH (ref 1.8–7.4)
NEUTROPHILS NFR BLD AUTO: 72 % — SIGNIFICANT CHANGE UP (ref 43–77)
NEUTS BAND # BLD: 17 % — HIGH (ref 0–8)
NITRITE UR-MCNC: NEGATIVE — SIGNIFICANT CHANGE UP
NRBC # BLD: 0 /100 — SIGNIFICANT CHANGE UP (ref 0–0)
NRBC # BLD: SIGNIFICANT CHANGE UP /100 WBCS (ref 0–0)
PH UR: 5 — SIGNIFICANT CHANGE UP (ref 5–8)
PLAT MORPH BLD: NORMAL — SIGNIFICANT CHANGE UP
PLATELET # BLD AUTO: 199 K/UL — SIGNIFICANT CHANGE UP (ref 150–400)
POTASSIUM SERPL-MCNC: 4.1 MMOL/L — SIGNIFICANT CHANGE UP (ref 3.5–5.3)
POTASSIUM SERPL-SCNC: 4.1 MMOL/L — SIGNIFICANT CHANGE UP (ref 3.5–5.3)
PROT SERPL-MCNC: 6.7 GM/DL — SIGNIFICANT CHANGE UP (ref 6–8.3)
PROT UR-MCNC: 15 MG/DL
PROTHROM AB SERPL-ACNC: 19.3 SEC — HIGH (ref 10–12.9)
RBC # BLD: 3.37 M/UL — LOW (ref 4.2–5.8)
RBC # FLD: 14.6 % — HIGH (ref 10.3–14.5)
RBC BLD AUTO: NORMAL — SIGNIFICANT CHANGE UP
RBC CASTS # UR COMP ASSIST: SIGNIFICANT CHANGE UP /HPF (ref 0–4)
SODIUM SERPL-SCNC: 141 MMOL/L — SIGNIFICANT CHANGE UP (ref 135–145)
SP GR SPEC: 1.01 — SIGNIFICANT CHANGE UP (ref 1.01–1.02)
TROPONIN I SERPL-MCNC: <0.015 NG/ML — SIGNIFICANT CHANGE UP (ref 0.01–0.04)
TYPE + AB SCN PNL BLD: SIGNIFICANT CHANGE UP
UROBILINOGEN FLD QL: 1 MG/DL
WBC # BLD: 12.09 K/UL — HIGH (ref 3.8–10.5)
WBC # FLD AUTO: 12.09 K/UL — HIGH (ref 3.8–10.5)
WBC UR QL: SIGNIFICANT CHANGE UP

## 2019-03-10 PROCEDURE — 99285 EMERGENCY DEPT VISIT HI MDM: CPT

## 2019-03-10 PROCEDURE — 93010 ELECTROCARDIOGRAM REPORT: CPT

## 2019-03-10 PROCEDURE — 71045 X-RAY EXAM CHEST 1 VIEW: CPT | Mod: 26

## 2019-03-10 PROCEDURE — 70450 CT HEAD/BRAIN W/O DYE: CPT | Mod: 26

## 2019-03-10 PROCEDURE — 72170 X-RAY EXAM OF PELVIS: CPT | Mod: 26

## 2019-03-10 PROCEDURE — 73090 X-RAY EXAM OF FOREARM: CPT | Mod: 26,RT

## 2019-03-10 PROCEDURE — 72125 CT NECK SPINE W/O DYE: CPT | Mod: 26

## 2019-03-10 RX ORDER — SODIUM CHLORIDE 9 MG/ML
500 INJECTION INTRAMUSCULAR; INTRAVENOUS; SUBCUTANEOUS ONCE
Qty: 0 | Refills: 0 | Status: COMPLETED | OUTPATIENT
Start: 2019-03-10 | End: 2019-03-10

## 2019-03-10 RX ORDER — TETANUS TOXOID, REDUCED DIPHTHERIA TOXOID AND ACELLULAR PERTUSSIS VACCINE, ADSORBED 5; 2.5; 8; 8; 2.5 [IU]/.5ML; [IU]/.5ML; UG/.5ML; UG/.5ML; UG/.5ML
0.5 SUSPENSION INTRAMUSCULAR ONCE
Qty: 0 | Refills: 0 | Status: COMPLETED | OUTPATIENT
Start: 2019-03-10 | End: 2019-03-10

## 2019-03-10 RX ORDER — SODIUM CHLORIDE 9 MG/ML
3 INJECTION INTRAMUSCULAR; INTRAVENOUS; SUBCUTANEOUS ONCE
Qty: 0 | Refills: 0 | Status: COMPLETED | OUTPATIENT
Start: 2019-03-10 | End: 2019-03-10

## 2019-03-10 RX ORDER — SODIUM CHLORIDE 9 MG/ML
1000 INJECTION INTRAMUSCULAR; INTRAVENOUS; SUBCUTANEOUS ONCE
Qty: 0 | Refills: 0 | Status: COMPLETED | OUTPATIENT
Start: 2019-03-10 | End: 2019-03-10

## 2019-03-10 RX ADMIN — TETANUS TOXOID, REDUCED DIPHTHERIA TOXOID AND ACELLULAR PERTUSSIS VACCINE, ADSORBED 0.5 MILLILITER(S): 5; 2.5; 8; 8; 2.5 SUSPENSION INTRAMUSCULAR at 17:07

## 2019-03-10 RX ADMIN — SODIUM CHLORIDE 1000 MILLILITER(S): 9 INJECTION INTRAMUSCULAR; INTRAVENOUS; SUBCUTANEOUS at 00:00

## 2019-03-10 RX ADMIN — SODIUM CHLORIDE 666.67 MILLILITER(S): 9 INJECTION INTRAMUSCULAR; INTRAVENOUS; SUBCUTANEOUS at 18:27

## 2019-03-10 RX ADMIN — SODIUM CHLORIDE 3 MILLILITER(S): 9 INJECTION INTRAMUSCULAR; INTRAVENOUS; SUBCUTANEOUS at 16:00

## 2019-03-10 RX ADMIN — SODIUM CHLORIDE 666.67 MILLILITER(S): 9 INJECTION INTRAMUSCULAR; INTRAVENOUS; SUBCUTANEOUS at 16:00

## 2019-03-10 NOTE — ED PROVIDER NOTE - EYES, MLM
Clear bilaterally, pupils equal, round and reactive to light. +pupils 3mm b/l equal, negative raccoons, EOMI

## 2019-03-10 NOTE — ED PROVIDER NOTE - CLINICAL SUMMARY MEDICAL DECISION MAKING FREE TEXT BOX
89 y/o male h/o HTN, frequent falls, meningioma nonsurgically manage BIBA s/p fall last night. Unable to get up and remained on floor until EMS arrival. +skin tears right forearm, pt denies pain, no other complaints. Pt dehydrated upon exam. Plan CT head, c-spine, XR chest, pelvis, right forearm, IV fluids, labs, including trop, urine, rectal temp, Tdap, local wound care, monitor, observe, and reassess. Ambulation trail if radiologic studies negative.

## 2019-03-10 NOTE — ED ADULT NURSE REASSESSMENT NOTE - NS ED NURSE REASSESS COMMENT FT1
Spoke with Elyssa, patients wife and updated patient and wife on plan of care. Informed wife of discharge. As per wife, they have a friend that comes over daily to assist with patients care. Wife and patient agree to discharge. Awaiting transport home.

## 2019-03-10 NOTE — H&P ADULT - ASSESSMENT
88 y/o M PMHx significant for hypertension, hyperlipidemia, meningioma, recurrent mechanical falls, and cervical myelopathy/radiculopathy presents to the ED for further evaluation of an unwitnessed fall last night. In the ED the patient was found to have notable skin tears to his right upper arm. The patient notes that the fall occurred upon ambulating to the bathroom and was unable to get up on his own. Labs => WBC 12.09, Hgb/Hct 11.1/32.8, LA 1.6, Alb 2.6, BUN/Cr 64/1.80, , TnI (-) x 3, Bands 17%. In the ED the patient received NS x 1.5L.    1)Falls in the Elderly  ~admit to Medicine  ~f/u w/ PT evaluation in the am  ~fall precautions  ~ambulate w/ assistance     2)JAMIE on CKD  ~DDx includes pre-renal azotemia vs ATN  ~cont. trial IV hydration  ~f/u urine studies  ~strict I/Os  ~will hold Furosemide for now (takes 20mg po daily)    3)Leukocytosis with Bandemia  ~f/u PAN C+S  ~f/u RVP  ~given IV abx x 1 in the ED  ~f/u w/ ID in the am    4)HTN  ~cont. Amlodipine 10mg po daily  ~cont. Lisinopril 40mg po daily  ~cont. Metoprolol XL 100mg po daily    5)Vte ppx  IMPROVE VTE Risk Score is 1  [  ] Previous VTE                                                  3  [  ] Thrombophilia                                               2  [  ] Lower limb paralysis                                      2        (unable to hold up >15 seconds)    [  ] Current Cancer                                              2         (within 6 months)  [  ] Immobilization > 24 hrs                                1  [  ] ICU/CCU stay > 24 hours                              1  [X] Age > 60                                                      1  ~cont. SCDs for now 89 y/o M PMHx significant for hypertension, hyperlipidemia, meningioma, recurrent mechanical falls, and cervical myelopathy/radiculopathy presents to the ED for further evaluation of an unwitnessed fall last night. In the ED the patient was found to have notable skin tears to his right upper arm. The patient notes that the fall occurred upon ambulating to the bathroom and was unable to get up on his own. Labs => WBC 12.09, Hgb/Hct 11.1/32.8, LA 1.6, Alb 2.6, BUN/Cr 64/1.80, , TnI (-) x 3, Bands 17%. In the ED the patient received NS x 1.5L.    1)Falls in the Elderly  ~admit to Medicine  ~f/u w/ PT evaluation in the am  ~fall precautions  ~ambulate w/ assistance     2)JAMIE on CKD  ~DDx includes pre-renal azotemia vs ATN  ~cont. trial IV hydration  ~f/u urine studies  ~strict I/Os  ~will hold Furosemide for now (takes 20mg po daily)    3)Leukocytosis with Bandemia  ~f/u PAN C+S  ~CXR concerning for probable infiltrate  ~f/u RVP  ~given IV abx x 1 in the ED  ~f/u w/ ID in the am    4)HTN  ~cont. Amlodipine 10mg po daily  ~cont. Lisinopril 40mg po daily  ~cont. Metoprolol XL 100mg po daily    5)Malnutrition  ~f/u w/ Nutrition consultation in the am    6)Vte ppx  IMPROVE VTE Risk Score is 1  [  ] Previous VTE                                                  3  [  ] Thrombophilia                                               2  [  ] Lower limb paralysis                                      2        (unable to hold up >15 seconds)    [  ] Current Cancer                                              2         (within 6 months)  [  ] Immobilization > 24 hrs                                1  [  ] ICU/CCU stay > 24 hours                              1  [X] Age > 60                                                      1  ~cont. SCDs for now

## 2019-03-10 NOTE — H&P ADULT - NSHPSOURCEINFORD_GEN_ALL_CORE
Patient/Chart(s) Patient/Chart(s)/Physician/Provider Chart(s)/Spouse/Significant Other/Physician/Provider

## 2019-03-10 NOTE — ED ADULT NURSE NOTE - OBJECTIVE STATEMENT
pt BIBEMS from home s/p fall, unwitnessed. denies hitting head. denies complaints. states wife sent him in to be checked out. unknown down time. abrasion to R forearm.

## 2019-03-10 NOTE — ED ADULT NURSE REASSESSMENT NOTE - NS ED NURSE REASSESS COMMENT FT1
pt wife laura called re: pt condition. ok w/ patient to discuss. provided w/ update, states pt does not take any medication for atrial fibrillation.     at home meds as per wife:    furosemide 20mg 1 x day  metoprolol  mg 1 x day  amlodipine 5 mg 1 x day  lisinopril 40 mg 1 x day

## 2019-03-10 NOTE — ED PROVIDER NOTE - GASTROINTESTINAL, MLM
Abdomen soft, non-tender, no guarding. BS +, small umbilical hernia, no discoloration, easily reducible manually

## 2019-03-10 NOTE — H&P ADULT - NSHPPHYSICALEXAM_GEN_ALL_CORE
Vital Signs Last 24 Hrs  T(C): 36.6 (10 Mar 2019 19:51), Max: 36.6 (10 Mar 2019 19:51)  T(F): 97.9 (10 Mar 2019 19:51), Max: 97.9 (10 Mar 2019 19:51)  HR: 62 (10 Mar 2019 19:51) (61 - 62)  BP: 96/55 (10 Mar 2019 19:51) (96/55 - 125/62)  RR: 16 (10 Mar 2019 19:51) (16 - 16)  SpO2: 98% (10 Mar 2019 19:51) (98% - 100%)

## 2019-03-10 NOTE — ED ADULT NURSE REASSESSMENT NOTE - NS ED NURSE REASSESS COMMENT FT1
pt ambulated in hallway w/ walker and assistance of NA. pt reports weakness. MD Contino aware. provided w/ sandwich.

## 2019-03-10 NOTE — ED ADULT NURSE REASSESSMENT NOTE - NS ED NURSE REASSESS COMMENT FT1
As per MD Contino, discharged cancelled. Straight Cath done to obtain urine sample, urine collected and sent. Patient changed and repositioned for comfort. Spoke with patient and wife and updated on new plan of care.

## 2019-03-10 NOTE — ED PROVIDER NOTE - PROGRESS NOTE DETAILS
Dr. Barnett: In process of arranging D/C, differential on bloods + 17% bands.  Urine w/ C+S, BCs, serum lactate ordered; pt afebrile.  Admitting pt for bandemia, r/o infxn., frequent falls, dehydration.

## 2019-03-10 NOTE — ED ADULT TRIAGE NOTE - CHIEF COMPLAINT QUOTE
fall last night, unwitnessed, wife wants to get him checked out. pt has no complaints, skin tears noted to right arm. no blood thinners. alert and oriented x4.

## 2019-03-10 NOTE — ED PROVIDER NOTE - CONSTITUTIONAL, MLM
normal... +elderly white male, no respiratory discomfort, nontoxic, NC/AT, awake, alert, oriented to person, place, time/situation and in no apparent distress. +poor hygiene and unkempt

## 2019-03-10 NOTE — ED ADULT NURSE NOTE - NSIMPLEMENTINTERV_GEN_ALL_ED
Implemented All Fall with Harm Risk Interventions:  [facility] to call system. Call bell, personal items and telephone within reach. Instruct patient to call for assistance. Room bathroom lighting operational. Non-slip footwear when patient is off stretcher. Physically safe environment: no spills, clutter or unnecessary equipment. Stretcher in lowest position, wheels locked, appropriate side rails in place. Provide visual cue, wrist band, yellow gown, etc. Monitor gait and stability. Monitor for mental status changes and reorient to person, place, and time. Review medications for side effects contributing to fall risk. Reinforce activity limits and safety measures with patient and family. Provide visual clues: red socks.

## 2019-03-10 NOTE — H&P ADULT - NSHPOUTPATIENTPROVIDERS_GEN_ALL_CORE
PCP; Dr. Rodriguez  Neurosurgery; Dr. Billy Ortiz PCP; Dr. Sean Rodriguez  Neurosurgery; Dr. Billy Ortiz

## 2019-03-10 NOTE — H&P ADULT - HISTORY OF PRESENT ILLNESS
89 y/o M PMHx significant for hypertension, hyperlipidemia, meningioma, recurrent mechanical falls, and cervical myelopathy/radiculopathy presents to the ED for further evaluation of an unwitnessed fall last night. In the ED the patient was found to have notable skin tears to his right upper arm. The patient notes that the fall occurred upon ambulating to the bathroom and was unable to get up on his own. Labs => WBC 12.09, Hgb/Hct 11.1/32.8, LA 1.6, Alb 2.6, BUN/Cr 64/1.80, , TnI (-) x 3, Bands 17%. In the ED the patient received NS x 1.5L.

## 2019-03-10 NOTE — ED PROVIDER NOTE - CARE PLAN
Principal Discharge DX:	Bandemia  Secondary Diagnosis:	Frequent falls  Secondary Diagnosis:	Dehydration

## 2019-03-10 NOTE — ED PROVIDER NOTE - NEUROLOGICAL, MLM
Alert and oriented, no focal deficits, no motor or sensory deficits. Cranial nerves 2-12 intact. Sensation intact. +speech slightly slurred, questionable chronic

## 2019-03-10 NOTE — ED PROVIDER NOTE - UNABLE TO OBTAIN
Pt poor historian, unable to obtain complete HPI. Dementia Pt is a poor historian, unable to obtain complete ROS

## 2019-03-10 NOTE — ED PROVIDER NOTE - MUSCULOSKELETAL, MLM
Spine appears normal, range of motion is not limited, no muscle or joint tenderness, neck nontender and supple, back nontender and stable, pelvis nontender and stable, MAEx4, AFROM all large joints w/o apparent pain, b/l SLR normal 45 degrees without pain, normal motor

## 2019-03-10 NOTE — H&P ADULT - PMH
Hypercholesterolemia    Hypertension Cervical spondylosis with myelopathy and radiculopathy    Hypercholesterolemia    Hypertension    Meningioma

## 2019-03-11 DIAGNOSIS — Z98.49 CATARACT EXTRACTION STATUS, UNSPECIFIED EYE: Chronic | ICD-10-CM

## 2019-03-11 LAB
ALBUMIN SERPL ELPH-MCNC: 2 G/DL — LOW (ref 3.3–5)
ALP SERPL-CCNC: 90 U/L — SIGNIFICANT CHANGE UP (ref 40–120)
ALT FLD-CCNC: 16 U/L — SIGNIFICANT CHANGE UP (ref 12–78)
ANION GAP SERPL CALC-SCNC: 9 MMOL/L — SIGNIFICANT CHANGE UP (ref 5–17)
ANISOCYTOSIS BLD QL: SLIGHT — SIGNIFICANT CHANGE UP
AST SERPL-CCNC: 43 U/L — HIGH (ref 15–37)
BASOPHILS # BLD AUTO: 0 K/UL — SIGNIFICANT CHANGE UP (ref 0–0.2)
BASOPHILS NFR BLD AUTO: 0 % — SIGNIFICANT CHANGE UP (ref 0–2)
BILIRUB SERPL-MCNC: 0.8 MG/DL — SIGNIFICANT CHANGE UP (ref 0.2–1.2)
BUN SERPL-MCNC: 67 MG/DL — HIGH (ref 7–23)
CALCIUM SERPL-MCNC: 8.4 MG/DL — LOW (ref 8.5–10.1)
CHLORIDE SERPL-SCNC: 109 MMOL/L — HIGH (ref 96–108)
CO2 SERPL-SCNC: 22 MMOL/L — SIGNIFICANT CHANGE UP (ref 22–31)
CREAT SERPL-MCNC: 1.62 MG/DL — HIGH (ref 0.5–1.3)
EOSINOPHIL # BLD AUTO: 0 K/UL — SIGNIFICANT CHANGE UP (ref 0–0.5)
EOSINOPHIL NFR BLD AUTO: 0 % — SIGNIFICANT CHANGE UP (ref 0–6)
GLUCOSE SERPL-MCNC: 95 MG/DL — SIGNIFICANT CHANGE UP (ref 70–99)
HCT VFR BLD CALC: 29.5 % — LOW (ref 39–50)
HGB BLD-MCNC: 10 G/DL — LOW (ref 13–17)
LYMPHOCYTES # BLD AUTO: 0.89 K/UL — LOW (ref 1–3.3)
LYMPHOCYTES # BLD AUTO: 11 % — LOW (ref 13–44)
MACROCYTES BLD QL: SLIGHT — SIGNIFICANT CHANGE UP
MAGNESIUM SERPL-MCNC: 1.9 MG/DL — SIGNIFICANT CHANGE UP (ref 1.6–2.6)
MANUAL SMEAR VERIFICATION: SIGNIFICANT CHANGE UP
MCHC RBC-ENTMCNC: 32.9 PG — SIGNIFICANT CHANGE UP (ref 27–34)
MCHC RBC-ENTMCNC: 33.9 GM/DL — SIGNIFICANT CHANGE UP (ref 32–36)
MCV RBC AUTO: 97 FL — SIGNIFICANT CHANGE UP (ref 80–100)
MONOCYTES # BLD AUTO: 0.4 K/UL — SIGNIFICANT CHANGE UP (ref 0–0.9)
MONOCYTES NFR BLD AUTO: 5 % — SIGNIFICANT CHANGE UP (ref 2–14)
NEUTROPHILS # BLD AUTO: 6.76 K/UL — SIGNIFICANT CHANGE UP (ref 1.8–7.4)
NEUTROPHILS NFR BLD AUTO: 82 % — HIGH (ref 43–77)
NEUTS BAND # BLD: 2 % — SIGNIFICANT CHANGE UP (ref 0–8)
NRBC # BLD: 0 /100 — SIGNIFICANT CHANGE UP (ref 0–0)
NRBC # BLD: SIGNIFICANT CHANGE UP /100 WBCS (ref 0–0)
PLAT MORPH BLD: NORMAL — SIGNIFICANT CHANGE UP
PLATELET # BLD AUTO: 179 K/UL — SIGNIFICANT CHANGE UP (ref 150–400)
POIKILOCYTOSIS BLD QL AUTO: SLIGHT — SIGNIFICANT CHANGE UP
POTASSIUM SERPL-MCNC: 3.7 MMOL/L — SIGNIFICANT CHANGE UP (ref 3.5–5.3)
POTASSIUM SERPL-SCNC: 3.7 MMOL/L — SIGNIFICANT CHANGE UP (ref 3.5–5.3)
PROT SERPL-MCNC: 5.4 GM/DL — LOW (ref 6–8.3)
RAPID RVP RESULT: SIGNIFICANT CHANGE UP
RBC # BLD: 3.04 M/UL — LOW (ref 4.2–5.8)
RBC # FLD: 14.6 % — HIGH (ref 10.3–14.5)
RBC BLD AUTO: ABNORMAL
SODIUM SERPL-SCNC: 140 MMOL/L — SIGNIFICANT CHANGE UP (ref 135–145)
WBC # BLD: 8.05 K/UL — SIGNIFICANT CHANGE UP (ref 3.8–10.5)
WBC # FLD AUTO: 8.05 K/UL — SIGNIFICANT CHANGE UP (ref 3.8–10.5)

## 2019-03-11 RX ORDER — CEFTRIAXONE 500 MG/1
1000 INJECTION, POWDER, FOR SOLUTION INTRAMUSCULAR; INTRAVENOUS ONCE
Qty: 0 | Refills: 0 | Status: COMPLETED | OUTPATIENT
Start: 2019-03-11 | End: 2019-03-11

## 2019-03-11 RX ORDER — LISINOPRIL 2.5 MG/1
40 TABLET ORAL DAILY
Qty: 0 | Refills: 0 | Status: DISCONTINUED | OUTPATIENT
Start: 2019-03-11 | End: 2019-03-12

## 2019-03-11 RX ORDER — CEFTRIAXONE 500 MG/1
INJECTION, POWDER, FOR SOLUTION INTRAMUSCULAR; INTRAVENOUS
Qty: 0 | Refills: 0 | Status: DISCONTINUED | OUTPATIENT
Start: 2019-03-11 | End: 2019-03-11

## 2019-03-11 RX ORDER — AMLODIPINE BESYLATE 2.5 MG/1
1 TABLET ORAL
Qty: 0 | Refills: 0 | COMMUNITY

## 2019-03-11 RX ORDER — AZITHROMYCIN 500 MG/1
TABLET, FILM COATED ORAL
Qty: 0 | Refills: 0 | Status: DISCONTINUED | OUTPATIENT
Start: 2019-03-11 | End: 2019-03-11

## 2019-03-11 RX ORDER — SODIUM CHLORIDE 9 MG/ML
1000 INJECTION INTRAMUSCULAR; INTRAVENOUS; SUBCUTANEOUS
Qty: 0 | Refills: 0 | Status: DISCONTINUED | OUTPATIENT
Start: 2019-03-11 | End: 2019-03-12

## 2019-03-11 RX ORDER — AZITHROMYCIN 500 MG/1
500 TABLET, FILM COATED ORAL ONCE
Qty: 0 | Refills: 0 | Status: COMPLETED | OUTPATIENT
Start: 2019-03-11 | End: 2019-03-11

## 2019-03-11 RX ORDER — ONDANSETRON 8 MG/1
4 TABLET, FILM COATED ORAL EVERY 6 HOURS
Qty: 0 | Refills: 0 | Status: DISCONTINUED | OUTPATIENT
Start: 2019-03-11 | End: 2019-03-12

## 2019-03-11 RX ORDER — AMLODIPINE BESYLATE 2.5 MG/1
5 TABLET ORAL DAILY
Qty: 0 | Refills: 0 | Status: DISCONTINUED | OUTPATIENT
Start: 2019-03-11 | End: 2019-03-12

## 2019-03-11 RX ORDER — FUROSEMIDE 40 MG
1 TABLET ORAL
Qty: 0 | Refills: 0 | COMMUNITY

## 2019-03-11 RX ORDER — SODIUM CHLORIDE 9 MG/ML
1000 INJECTION, SOLUTION INTRAVENOUS
Qty: 0 | Refills: 0 | Status: COMPLETED | OUTPATIENT
Start: 2019-03-11 | End: 2019-03-11

## 2019-03-11 RX ORDER — DOCUSATE SODIUM 100 MG
100 CAPSULE ORAL THREE TIMES A DAY
Qty: 0 | Refills: 0 | Status: DISCONTINUED | OUTPATIENT
Start: 2019-03-11 | End: 2019-03-12

## 2019-03-11 RX ORDER — SENNA PLUS 8.6 MG/1
2 TABLET ORAL AT BEDTIME
Qty: 0 | Refills: 0 | Status: DISCONTINUED | OUTPATIENT
Start: 2019-03-11 | End: 2019-03-12

## 2019-03-11 RX ORDER — PREGABALIN 225 MG/1
5 CAPSULE ORAL
Qty: 0 | Refills: 0 | COMMUNITY

## 2019-03-11 RX ORDER — ACETAMINOPHEN 500 MG
650 TABLET ORAL EVERY 6 HOURS
Qty: 0 | Refills: 0 | Status: DISCONTINUED | OUTPATIENT
Start: 2019-03-11 | End: 2019-03-12

## 2019-03-11 RX ORDER — HEPARIN SODIUM 5000 [USP'U]/ML
5000 INJECTION INTRAVENOUS; SUBCUTANEOUS EVERY 12 HOURS
Qty: 0 | Refills: 0 | Status: DISCONTINUED | OUTPATIENT
Start: 2019-03-11 | End: 2019-03-12

## 2019-03-11 RX ORDER — METOPROLOL TARTRATE 50 MG
100 TABLET ORAL DAILY
Qty: 0 | Refills: 0 | Status: DISCONTINUED | OUTPATIENT
Start: 2019-03-11 | End: 2019-03-12

## 2019-03-11 RX ADMIN — AZITHROMYCIN 250 MILLIGRAM(S): 500 TABLET, FILM COATED ORAL at 01:36

## 2019-03-11 RX ADMIN — SODIUM CHLORIDE 70 MILLILITER(S): 9 INJECTION INTRAMUSCULAR; INTRAVENOUS; SUBCUTANEOUS at 21:46

## 2019-03-11 RX ADMIN — CEFTRIAXONE 1000 MILLIGRAM(S): 500 INJECTION, POWDER, FOR SOLUTION INTRAMUSCULAR; INTRAVENOUS at 01:36

## 2019-03-11 RX ADMIN — SODIUM CHLORIDE 60 MILLILITER(S): 9 INJECTION, SOLUTION INTRAVENOUS at 03:10

## 2019-03-11 RX ADMIN — HEPARIN SODIUM 5000 UNIT(S): 5000 INJECTION INTRAVENOUS; SUBCUTANEOUS at 17:14

## 2019-03-11 NOTE — CHART NOTE - NSCHARTNOTEFT_GEN_A_CORE
Upon Nutritional Assessment by the Registered Dietitian your patient was determined to meet criteria / has evidence of the following diagnosis/diagnoses:          [ ]  Mild Protein Calorie Malnutrition        [ x]  Moderate Protein Calorie Malnutrition        [ ] Severe Protein Calorie Malnutrition        [ ] Unspecified Protein Calorie Malnutrition        [ ] Underweight / BMI <19        [ ] Morbid Obesity / BMI > 40      Findings:    During assessment pt finishing breakfast w 75% completed and intending to complete 100%. Pt reports intake can fluctuate depending on if pt is feeling well. Pt provided overall diet recall which revealed pt is consuming <75% needs >1 month. Pt reports living at home alone w wife. As per pt wife does cooking/shopping normally and pt reports eating microwave dinner regularly. D/w pt MOW options will d/w SW to see if pt is eligible. Also d/w pt about supplementing w ensure at home to meet help meet danielle/protein needs. Pt was receptive.  NFPE significant for muscle wasting: moderate: temporal, clavicle, deltoid, interosseous, thigh, patellar, and calf regions; and fat depletion: moderate: occipital region and mild: triceps and ribs. Pt unable to recall UBW as he has not weighed himself in a long time.  Wt hx from EMR seems to fluctuate greatly and last wt from >1 year ago. Pt currently noted w mild edema in b/l feet and moderate edema in scrotum. Based on above pt meets criteria for moderate malnutrition in the context of chronic illness.    Findings as based on:  •  Comprehensive nutrition assessment and consultation  •  Calorie counts (nutrient intake analysis)  •  Food acceptance and intake status from observations by staff  •  Follow up  •  Patient education  •  Intervention secondary to interdisciplinary rounds  •   concerns      Treatment:      1. SLP evaluation for diet consistency.   2. Encourage intake at each meal w adequate protein.   3. Add ensure enlive 1x/day   4. MOW if pt meets criteria once d/cd.   5. Obtain new wt and monitor weekly.     The following diet has been recommended:      PROVIDER Section:     By signing this assessment you are acknowledging and agree with the diagnosis/diagnoses assigned by the Registered Dietitian    Comments:

## 2019-03-11 NOTE — SWALLOW BEDSIDE ASSESSMENT ADULT - COMMENTS
The pt was admitted to  s/p multiple falls. Hospital course is notable for JAMIE, leukocytosis with bandemia, malnutrition and generalized deconditioning. This profile is superimposed upon a history of cervical spondylosis/myelopathy, presence of a meningioma, HTN, CKD, HLD and prior bilateral cataracts surgery. The patient was admitted to  s/p multiple falls. Hospital course is notable for JAMIE, leukocytosis with bandemia, malnutrition and generalized deconditioning. This profile is superimposed upon a history of cervical spondylosis/myelopathy, presence of a meningioma, HTN, CKD, HLD and prior bilateral cataracts surgery.

## 2019-03-11 NOTE — SWALLOW BEDSIDE ASSESSMENT ADULT - NS SPL SWALLOW CLINIC TRIAL FT
Bolus formation/transfer were grossly mechanically functional for age despite many missing teeth. Laryngeal lift on palpation during swallowing trials was felt to be grossly within functional age acceptable parameters, NO behavioral aspiration signs exhibited on exam. Odynophagia was denied.

## 2019-03-11 NOTE — DIETITIAN INITIAL EVALUATION ADULT. - OTHER INFO
Pt was seen as consult for unspecified assessment. Pt is a 88yo M w PMH HTN, HLD, meningioma, recurrent mechanical falls, and cervical myelopathy/radiculopathy, dementia presents s/p unwitnessed fall. During assessment pt finishing breakfast w 75% completed and intending to complete 100%. Pt reports intake can fluctuate depending on if pt is feeling well. Pt provided overall diet recall which revealed pt is consuming <75% needs >1 month. Pt reports living at home alone w wife. As per pt wife does cooking/shopping normally and pt reports eating microwave dinner regularly. D/w pt MOW options will d/w SW to see if pt is eligible. Also d/w pt about supplementing w ensure at home to meet help meet danielle/protein needs. Pt was receptive.  NFPE significant for muscle wasting: moderate: temporal, clavicle, deltoid, interosseous, thigh, patellar, and calf regions; and fat depletion: moderate: occipital region and mild: triceps and ribs. Pt unable to recall UBW as he has not weighed himself in a long time.  Wt hx from EMR seems to fluctuate greatly and last wt from >1 year ago. Pt currently noted w mild edema in b/l feet and moderate edema in scrotum. Based on above pt meets criteria for moderate malnutrition in the context of chronic illness. Pt is currently on [facility] soft diet and has active consult for SLP eval for recommended diet. Pt has no c/o currently of n/v/c/d; last BM noted today. Skin intact and B 17. Recommendations: 1. SLP evaluation for diet consistency. 2. Encourage intake at each meal w adequate protein. 3. Add ensure enlive 1x/day 4. MOW if pt meets criteria once d/cd. 5. Obtain new wt and monitor weekly.

## 2019-03-11 NOTE — DIETITIAN INITIAL EVALUATION ADULT. - PHYSICAL APPEARANCE
BMI 27.3; NFPE significant for muscle wasting: moderate: temporal, clavicle, deltoid, interosseous, thigh, patellar, and calf regions; and fat depletion: moderate: occipital region and mild: triceps and ribs.

## 2019-03-11 NOTE — SWALLOW BEDSIDE ASSESSMENT ADULT - SWALLOW EVAL: CRITERIA FOR SKILLED INTERVENTION MET
ACUTE SPEECH PATHOLOGY INTERVENTION WOULD NOT CHANGE CLINICAL MANAGEMENT/BE OF BENEFIT. NO CHRISTEN PRIMARY SPEECH-LANGUAGE PATHOLOGY WAS EVIDENT AND OROPHARYNGEAL SWALLOWING ABILITIES APPEAR TO BE GROSSLY WITHIN FUNCTIONAL PARAMETERS FOR AGE. GIVEN ABOVE, WILL NOT ACTIVELY FOLLOW. RECONSULT PRN.

## 2019-03-11 NOTE — SWALLOW BEDSIDE ASSESSMENT ADULT - SWALLOW EVAL: DIAGNOSIS
1) The pt exhibits Oropharyngeal Swallowing abilities which subjectively appear to be grossly within functional parameters for age. NO behavioral aspiration signs exhibited on exam. He denied odynophagia. Note that oropharyngeal swallowing integrity does appear to be stable despite his many missing teeth/generalized deconditioning.   2) The pt was awake and interactive but easily fatigued as well as internally distractible at times. He was able to verbalize during communicative probes and in conversation without evidence of a primary motor speech or primary linguistic pathology. At reported communicative baseline.

## 2019-03-11 NOTE — DIETITIAN INITIAL EVALUATION ADULT. - ORAL INTAKE PTA
fair/pt reports intake can fluctuate; dietary recall reveals pt is normally eating cereal w milk for breakfast, pastry for lunch, and a microwave meal for dinner

## 2019-03-11 NOTE — SWALLOW BEDSIDE ASSESSMENT ADULT - SWALLOW EVAL: RECOMMENDED DIET
SUGGEST A REGULAR TEXTURE DIET WITH THIN LIQUID CONSISTENCIES AS HE APPEARED CLINICALLY TOLERANT OF THESE FOOD CONSISTENCIES FROM AN OROPHARYNGEAL SWALLOWING STANCE ON CLINICAL EXAM.

## 2019-03-11 NOTE — SWALLOW BEDSIDE ASSESSMENT ADULT - SLP GENERAL OBSERVATIONS
The pt was awake and interactive but easily fatigued as well as internally distractible at times. He was able to verbalize during communicative probes and in conversation without evidence of a primary motor speech or primary linguistic pathology. At reported communicative baseline.  Note that the pt denied aspiration signs or odynophagia with meals when asked.

## 2019-03-11 NOTE — ED ADULT NURSE REASSESSMENT NOTE - NS ED NURSE REASSESS COMMENT FT1
[facility] in bed comfortably. no complaints at this time. azithromycin infusing. spoke to dr. pak about diet order, plan to enter mechanical soft diet.

## 2019-03-11 NOTE — DIETITIAN INITIAL EVALUATION ADULT. - PERTINENT MEDS FT
MEDICATIONS  (STANDING):  amLODIPine   Tablet 5 milliGRAM(s) Oral daily  docusate sodium 100 milliGRAM(s) Oral three times a day  heparin  Injectable 5000 Unit(s) SubCutaneous every 12 hours  lisinopril 40 milliGRAM(s) Oral daily  metoprolol succinate  milliGRAM(s) Oral daily  sodium chloride 0.9%. 1000 milliLiter(s) (70 mL/Hr) IV Continuous <Continuous>    MEDICATIONS  (PRN):  acetaminophen   Tablet .. 650 milliGRAM(s) Oral every 6 hours PRN Temp greater or equal to 38C (100.4F), Mild Pain (1 - 3)  ondansetron Injectable 4 milliGRAM(s) IV Push every 6 hours PRN Nausea  senna 2 Tablet(s) Oral at bedtime PRN Constipation

## 2019-03-11 NOTE — DIETITIAN INITIAL EVALUATION ADULT. - PERTINENT LABORATORY DATA
03-11 Na140 mmol/L Glu 95 mg/dL K+ 3.7 mmol/L Cr  1.62 mg/dL<H> BUN 67 mg/dL<H> Phos n/a   Alb 2.0 g/dL<L> PAB n/a

## 2019-03-11 NOTE — PROGRESS NOTE ADULT - SUBJECTIVE AND OBJECTIVE BOX
CHIEF COMPLAINT:    SUBJECTIVE:     REVIEW OF SYSTEMS:    CONSTITUTIONAL: No weakness, fevers or chills  EYES/ENT: No visual changes;  No vertigo or throat pain   NECK: No pain or stiffness  RESPIRATORY: No cough, wheezing, hemoptysis; No shortness of breath  CARDIOVASCULAR: No chest pain or palpitations  GASTROINTESTINAL: No abdominal or epigastric pain. No nausea, vomiting, or hematemesis; No diarrhea or constipation. No melena or hematochezia.  GENITOURINARY: No dysuria, frequency or hematuria  NEUROLOGICAL: No numbness or weakness  SKIN: No itching, burning, rashes, or lesions   All other review of systems is negative unless indicated above    Vital Signs Last 24 Hrs  T(C): 37 (11 Mar 2019 04:41), Max: 37 (11 Mar 2019 04:41)  T(F): 98.6 (11 Mar 2019 04:41), Max: 98.6 (11 Mar 2019 04:41)  HR: 54 (11 Mar 2019 06:19) (53 - 69)  BP: 95/66 (11 Mar 2019 06:19) (89/46 - 125/62)  BP(mean): 67 (10 Mar 2019 23:16) (67 - 67)  RR: 18 (11 Mar 2019 04:41) (16 - 18)  SpO2: 97% (11 Mar 2019 04:41) (92% - 100%)    I&O's Summary    10 Mar 2019 07:01  -  11 Mar 2019 07:00  --------------------------------------------------------  IN: 149 mL / OUT: 0 mL / NET: 149 mL        CAPILLARY BLOOD GLUCOSE          PHYSICAL EXAM:    Constitutional: NAD, awake and alert, well-developed  HEENT: PERR, EOMI, Normal Hearing, MMM  Neck: Soft and supple, No LAD, No JVD  Respiratory: Breath sounds are clear bilaterally, No wheezing, rales or rhonchi  Cardiovascular: S1 and S2, regular rate and rhythm, no Murmurs, gallops or rubs  Gastrointestinal: Bowel Sounds present, soft, nontender, nondistended, no guarding, no rebound  Extremities: No peripheral edema  Vascular: 2+ peripheral pulses  Neurological: A/O x 3, no focal deficits  Musculoskeletal: 5/5 strength b/l upper and lower extremities  Skin: No rashes    MEDICATIONS:  MEDICATIONS  (STANDING):  amLODIPine   Tablet 5 milliGRAM(s) Oral daily  azithromycin  IVPB      cefTRIAXone Injectable.      docusate sodium 100 milliGRAM(s) Oral three times a day  lisinopril 40 milliGRAM(s) Oral daily  metoprolol succinate  milliGRAM(s) Oral daily      LABS: All Labs Reviewed:                        10.0   8.05  )-----------( 179      ( 11 Mar 2019 07:22 )             29.5     03-11    140  |  109<H>  |  67<H>  ----------------------------<  95  3.7   |  22  |  1.62<H>    Ca    8.4<L>      11 Mar 2019 07:22  Mg     1.9     03-11    TPro  5.4<L>  /  Alb  2.0<L>  /  TBili  0.8  /  DBili  x   /  AST  43<H>  /  ALT  16  /  AlkPhos  90  03-11    PT/INR - ( 10 Mar 2019 14:02 )   PT: 19.3 sec;   INR: 1.71 ratio         PTT - ( 10 Mar 2019 14:02 )  PTT:29.9 sec  CARDIAC MARKERS ( 10 Mar 2019 21:50 )  <0.015 ng/mL / x     / x     / x     / x      CARDIAC MARKERS ( 10 Mar 2019 19:29 )  <0.015 ng/mL / x     / x     / x     / x      CARDIAC MARKERS ( 10 Mar 2019 14:02 )  <0.015 ng/mL / x     / 572 U/L / x     / x                Assessment and Plan:   	  88 y/o M PMHx significant for hypertension, hyperlipidemia, meningioma, recurrent mechanical falls, and cervical myelopathy/radiculopathy presents to the ED for further evaluation of an unwitnessed fall last night. In the ED the patient was found to have notable skin tears to his right upper arm. The patient notes that the fall occurred upon ambulating to the bathroom and was unable to get up on his own. Labs => WBC 12.09, Hgb/Hct 11.1/32.8, LA 1.6, Alb 2.6, BUN/Cr 64/1.80, , TnI (-) x 3, Bands 17%. In the ED the patient received NS x 1.5L.    1)Falls in the Elderly  ~admit to Medicine  ~f/u w/ PT evaluation in the am  ~fall precautions  ~ambulate w/ assistance     2)JAMIE on CKD  ~DDx includes pre-renal azotemia vs ATN  ~cont. trial IV hydration  ~f/u urine studies  ~strict I/Os  ~will hold Furosemide for now (takes 20mg po daily)    3)Leukocytosis with Bandemia  -likley related to fall and likley dehydration  -hold antibiotics  -UA is clean, xray does not show any clear infiltrates or pna    4)HTN  ~cont. Amlodipine 10mg po daily  ~cont. Lisinopril 40mg po daily  ~cont. Metoprolol XL 100mg po daily    5)Malnutrition  ~f/u w/ Nutrition consultation in the am    6)Vte ppx  -hep sc CHIEF COMPLAINT/Diagnosis: Falls/ JAMIE/ dehydration    SUBJECTIVE: no complaints    REVIEW OF SYSTEMS:    CONSTITUTIONAL: No weakness, fevers or chills  EYES/ENT: No visual changes;  No vertigo or throat pain   NECK: No pain or stiffness  RESPIRATORY: No cough, wheezing, hemoptysis; No shortness of breath  CARDIOVASCULAR: No chest pain or palpitations  GASTROINTESTINAL: No abdominal or epigastric pain. No nausea, vomiting, or hematemesis; No diarrhea or constipation. No melena or hematochezia.  GENITOURINARY: No dysuria, frequency or hematuria  NEUROLOGICAL: No numbness or weakness  SKIN: No itching, burning, rashes, or lesions   All other review of systems is negative unless indicated above    Vital Signs Last 24 Hrs  T(C): 37 (11 Mar 2019 04:41), Max: 37 (11 Mar 2019 04:41)  T(F): 98.6 (11 Mar 2019 04:41), Max: 98.6 (11 Mar 2019 04:41)  HR: 54 (11 Mar 2019 06:19) (53 - 69)  BP: 95/66 (11 Mar 2019 06:19) (89/46 - 125/62)  BP(mean): 67 (10 Mar 2019 23:16) (67 - 67)  RR: 18 (11 Mar 2019 04:41) (16 - 18)  SpO2: 97% (11 Mar 2019 04:41) (92% - 100%)    I&O's Summary    10 Mar 2019 07:01  -  11 Mar 2019 07:00  --------------------------------------------------------  IN: 149 mL / OUT: 0 mL / NET: 149 mL        CAPILLARY BLOOD GLUCOSE          PHYSICAL EXAM:    Constitutional: NAD, awake and alert, well-developed  HEENT: PERR, EOMI, Normal Hearing, MMM  Neck: Soft and supple, No LAD, No JVD  Respiratory: Breath sounds are clear bilaterally, No wheezing, rales or rhonchi  Cardiovascular: S1 and S2, regular rate and rhythm, no Murmurs, gallops or rubs  Gastrointestinal: Bowel Sounds present, soft, nontender, nondistended, no guarding, no rebound  Extremities: No peripheral edema  Vascular: 2+ peripheral pulses  Neurological: A/O x 3, no focal deficits  Musculoskeletal: 5/5 strength b/l upper and lower extremities  Skin: No rashes    MEDICATIONS:  MEDICATIONS  (STANDING):  amLODIPine   Tablet 5 milliGRAM(s) Oral daily  azithromycin  IVPB      cefTRIAXone Injectable.      docusate sodium 100 milliGRAM(s) Oral three times a day  lisinopril 40 milliGRAM(s) Oral daily  metoprolol succinate  milliGRAM(s) Oral daily      LABS: All Labs Reviewed:                        10.0   8.05  )-----------( 179      ( 11 Mar 2019 07:22 )             29.5     03-11    140  |  109<H>  |  67<H>  ----------------------------<  95  3.7   |  22  |  1.62<H>    Ca    8.4<L>      11 Mar 2019 07:22  Mg     1.9     03-11    TPro  5.4<L>  /  Alb  2.0<L>  /  TBili  0.8  /  DBili  x   /  AST  43<H>  /  ALT  16  /  AlkPhos  90  03-11    PT/INR - ( 10 Mar 2019 14:02 )   PT: 19.3 sec;   INR: 1.71 ratio         PTT - ( 10 Mar 2019 14:02 )  PTT:29.9 sec  CARDIAC MARKERS ( 10 Mar 2019 21:50 )  <0.015 ng/mL / x     / x     / x     / x      CARDIAC MARKERS ( 10 Mar 2019 19:29 )  <0.015 ng/mL / x     / x     / x     / x      CARDIAC MARKERS ( 10 Mar 2019 14:02 )  <0.015 ng/mL / x     / 572 U/L / x     / x                Assessment and Plan:   	  89 y/o M PMHx significant for hypertension, hyperlipidemia, meningioma, recurrent mechanical falls, and cervical myelopathy/radiculopathy presents to the ED for further evaluation of an unwitnessed fall last night. In the ED the patient was found to have notable skin tears to his right upper arm. The patient notes that the fall occurred upon ambulating to the bathroom and was unable to get up on his own. Labs => WBC 12.09, Hgb/Hct 11.1/32.8, LA 1.6, Alb 2.6, BUN/Cr 64/1.80, , TnI (-) x 3, Bands 17%. In the ED the patient received NS x 1.5L.    1)Falls in the Elderly  -likley secondary to dehydration   -c/w iv fluids, feeling some improvment after fluids  -physical therapy to see patient    2)JAMIE on CKD  ~DDx includes pre-renal azotemia vs ATN  ~cont. trial IV hydration  ~f/u urine studies  ~strict I/Os  ~will hold Furosemide for now (takes 20mg po daily)    3)Leukocytosis with Bandemia  -likley related to fall and likley dehydration  -hold antibiotics  -UA is clean, xray does not show any clear infiltrates or pna    4)HTN  ~cont. Amlodipine 10mg po daily  ~cont. Lisinopril 40mg po daily  ~cont. Metoprolol XL 100mg po daily    5)Malnutrition  ~f/u w/ Nutrition consultation in the am    6)Vte ppx  -hep sc

## 2019-03-11 NOTE — PHYSICAL THERAPY INITIAL EVALUATION ADULT - DIAGNOSIS, PT EVAL
89 y.o. male s/p Falls in the Elderly 89 y.o. male s/p activity intolerance in the presence of recurrent falls

## 2019-03-12 ENCOUNTER — RESULT REVIEW (OUTPATIENT)
Age: 84
End: 2019-03-12

## 2019-03-12 DIAGNOSIS — N49.3 FOURNIER GANGRENE: ICD-10-CM

## 2019-03-12 DIAGNOSIS — K40.90 UNILATERAL INGUINAL HERNIA, WITHOUT OBSTRUCTION OR GANGRENE, NOT SPECIFIED AS RECURRENT: ICD-10-CM

## 2019-03-12 LAB
-  AMIKACIN: SIGNIFICANT CHANGE UP
-  AMPICILLIN/SULBACTAM: SIGNIFICANT CHANGE UP
-  AMPICILLIN: SIGNIFICANT CHANGE UP
-  AZTREONAM: SIGNIFICANT CHANGE UP
-  CANDIDA ALBICANS: SIGNIFICANT CHANGE UP
-  CANDIDA GLABRATA: SIGNIFICANT CHANGE UP
-  CANDIDA KRUSEI: SIGNIFICANT CHANGE UP
-  CANDIDA PARAPSILOSIS: SIGNIFICANT CHANGE UP
-  CANDIDA TROPICALIS: SIGNIFICANT CHANGE UP
-  CEFAZOLIN: SIGNIFICANT CHANGE UP
-  CEFEPIME: SIGNIFICANT CHANGE UP
-  CEFOXITIN: SIGNIFICANT CHANGE UP
-  CEFTRIAXONE: SIGNIFICANT CHANGE UP
-  CIPROFLOXACIN: SIGNIFICANT CHANGE UP
-  COAGULASE NEGATIVE STAPHYLOCOCCUS: SIGNIFICANT CHANGE UP
-  ERTAPENEM: SIGNIFICANT CHANGE UP
-  GENTAMICIN: SIGNIFICANT CHANGE UP
-  IMIPENEM: SIGNIFICANT CHANGE UP
-  K. PNEUMONIAE GROUP: SIGNIFICANT CHANGE UP
-  KPC RESISTANCE GENE: SIGNIFICANT CHANGE UP
-  LEVOFLOXACIN: SIGNIFICANT CHANGE UP
-  MEROPENEM: SIGNIFICANT CHANGE UP
-  NITROFURANTOIN: SIGNIFICANT CHANGE UP
-  PIPERACILLIN/TAZOBACTAM: SIGNIFICANT CHANGE UP
-  STREPTOCOCCUS SP. (NOT GRP A, B OR S PNEUMONIAE): SIGNIFICANT CHANGE UP
-  TIGECYCLINE: SIGNIFICANT CHANGE UP
-  TOBRAMYCIN: SIGNIFICANT CHANGE UP
-  TRIMETHOPRIM/SULFAMETHOXAZOLE: SIGNIFICANT CHANGE UP
A BAUMANNII DNA SPEC QL NAA+PROBE: SIGNIFICANT CHANGE UP
ANION GAP SERPL CALC-SCNC: 10 MMOL/L — SIGNIFICANT CHANGE UP (ref 5–17)
BUN SERPL-MCNC: 64 MG/DL — HIGH (ref 7–23)
CALCIUM SERPL-MCNC: 8.2 MG/DL — LOW (ref 8.5–10.1)
CHLORIDE SERPL-SCNC: 111 MMOL/L — HIGH (ref 96–108)
CO2 SERPL-SCNC: 22 MMOL/L — SIGNIFICANT CHANGE UP (ref 22–31)
CREAT ?TM UR-MCNC: 88 MG/DL — SIGNIFICANT CHANGE UP
CREAT SERPL-MCNC: 1.76 MG/DL — HIGH (ref 0.5–1.3)
CULTURE RESULTS: SIGNIFICANT CHANGE UP
E CLOAC COMP DNA BLD POS QL NAA+PROBE: SIGNIFICANT CHANGE UP
E COLI DNA BLD POS QL NAA+NON-PROBE: SIGNIFICANT CHANGE UP
ENTEROCOC DNA BLD POS QL NAA+NON-PROBE: SIGNIFICANT CHANGE UP
ENTEROCOC DNA BLD POS QL NAA+NON-PROBE: SIGNIFICANT CHANGE UP
GLUCOSE SERPL-MCNC: 141 MG/DL — HIGH (ref 70–99)
GP B STREP DNA BLD POS QL NAA+NON-PROBE: SIGNIFICANT CHANGE UP
GRAM STN FLD: SIGNIFICANT CHANGE UP
HAEM INFLU DNA BLD POS QL NAA+NON-PROBE: SIGNIFICANT CHANGE UP
K OXYTOCA DNA BLD POS QL NAA+NON-PROBE: SIGNIFICANT CHANGE UP
L MONOCYTOG DNA BLD POS QL NAA+NON-PROBE: SIGNIFICANT CHANGE UP
LEGIONELLA AG UR QL: NEGATIVE — SIGNIFICANT CHANGE UP
METHOD TYPE: SIGNIFICANT CHANGE UP
METHOD TYPE: SIGNIFICANT CHANGE UP
MRSA SPEC QL CULT: SIGNIFICANT CHANGE UP
MSSA DNA SPEC QL NAA+PROBE: SIGNIFICANT CHANGE UP
N MEN ISLT CULT: SIGNIFICANT CHANGE UP
ORGANISM # SPEC MICROSCOPIC CNT: SIGNIFICANT CHANGE UP
ORGANISM # SPEC MICROSCOPIC CNT: SIGNIFICANT CHANGE UP
P AERUGINOSA DNA BLD POS NAA+NON-PROBE: SIGNIFICANT CHANGE UP
POTASSIUM SERPL-MCNC: 3.7 MMOL/L — SIGNIFICANT CHANGE UP (ref 3.5–5.3)
POTASSIUM SERPL-SCNC: 3.7 MMOL/L — SIGNIFICANT CHANGE UP (ref 3.5–5.3)
PROT ?TM UR-MCNC: 18 MG/DL — HIGH (ref 0–12)
S MARCESCENS DNA BLD POS NAA+NON-PROBE: SIGNIFICANT CHANGE UP
S PNEUM DNA BLD POS QL NAA+NON-PROBE: SIGNIFICANT CHANGE UP
S PYO DNA BLD POS QL NAA+NON-PROBE: SIGNIFICANT CHANGE UP
SODIUM SERPL-SCNC: 143 MMOL/L — SIGNIFICANT CHANGE UP (ref 135–145)
SODIUM UR-SCNC: <20 MMOL/L — SIGNIFICANT CHANGE UP
SPECIMEN SOURCE: SIGNIFICANT CHANGE UP

## 2019-03-12 PROCEDURE — 72192 CT PELVIS W/O DYE: CPT | Mod: 26

## 2019-03-12 PROCEDURE — 99223 1ST HOSP IP/OBS HIGH 75: CPT

## 2019-03-12 PROCEDURE — 76870 US EXAM SCROTUM: CPT | Mod: 26

## 2019-03-12 PROCEDURE — 88304 TISSUE EXAM BY PATHOLOGIST: CPT | Mod: 26

## 2019-03-12 RX ORDER — HEPARIN SODIUM 5000 [USP'U]/ML
5000 INJECTION INTRAVENOUS; SUBCUTANEOUS EVERY 12 HOURS
Qty: 0 | Refills: 0 | Status: COMPLETED | OUTPATIENT
Start: 2019-03-12 | End: 2019-03-21

## 2019-03-12 RX ORDER — DOCUSATE SODIUM 100 MG
100 CAPSULE ORAL THREE TIMES A DAY
Qty: 0 | Refills: 0 | Status: DISCONTINUED | OUTPATIENT
Start: 2019-03-12 | End: 2019-03-27

## 2019-03-12 RX ORDER — ACETAMINOPHEN 500 MG
650 TABLET ORAL EVERY 6 HOURS
Qty: 0 | Refills: 0 | Status: DISCONTINUED | OUTPATIENT
Start: 2019-03-12 | End: 2019-03-27

## 2019-03-12 RX ORDER — ACETAMINOPHEN 500 MG
1000 TABLET ORAL ONCE
Qty: 0 | Refills: 0 | Status: COMPLETED | OUTPATIENT
Start: 2019-03-12 | End: 2019-03-12

## 2019-03-12 RX ORDER — HYDROMORPHONE HYDROCHLORIDE 2 MG/ML
30 INJECTION INTRAMUSCULAR; INTRAVENOUS; SUBCUTANEOUS
Qty: 0 | Refills: 0 | Status: DISCONTINUED | OUTPATIENT
Start: 2019-03-12 | End: 2019-03-13

## 2019-03-12 RX ORDER — METOPROLOL TARTRATE 50 MG
100 TABLET ORAL DAILY
Qty: 0 | Refills: 0 | Status: DISCONTINUED | OUTPATIENT
Start: 2019-03-12 | End: 2019-03-15

## 2019-03-12 RX ORDER — VANCOMYCIN HCL 1 G
VIAL (EA) INTRAVENOUS
Qty: 0 | Refills: 0 | Status: DISCONTINUED | OUTPATIENT
Start: 2019-03-12 | End: 2019-03-12

## 2019-03-12 RX ORDER — PIPERACILLIN AND TAZOBACTAM 4; .5 G/20ML; G/20ML
3.38 INJECTION, POWDER, LYOPHILIZED, FOR SOLUTION INTRAVENOUS EVERY 8 HOURS
Qty: 0 | Refills: 0 | Status: DISCONTINUED | OUTPATIENT
Start: 2019-03-12 | End: 2019-03-22

## 2019-03-12 RX ORDER — SODIUM CHLORIDE 9 MG/ML
1000 INJECTION, SOLUTION INTRAVENOUS
Qty: 0 | Refills: 0 | Status: DISCONTINUED | OUTPATIENT
Start: 2019-03-12 | End: 2019-03-15

## 2019-03-12 RX ORDER — NYSTATIN CREAM 100000 [USP'U]/G
1 CREAM TOPICAL
Qty: 0 | Refills: 0 | Status: DISCONTINUED | OUTPATIENT
Start: 2019-03-12 | End: 2019-03-12

## 2019-03-12 RX ORDER — PIPERACILLIN AND TAZOBACTAM 4; .5 G/20ML; G/20ML
3.38 INJECTION, POWDER, LYOPHILIZED, FOR SOLUTION INTRAVENOUS EVERY 8 HOURS
Qty: 0 | Refills: 0 | Status: DISCONTINUED | OUTPATIENT
Start: 2019-03-12 | End: 2019-03-12

## 2019-03-12 RX ORDER — ONDANSETRON 8 MG/1
4 TABLET, FILM COATED ORAL ONCE
Qty: 0 | Refills: 0 | Status: DISCONTINUED | OUTPATIENT
Start: 2019-03-12 | End: 2019-03-13

## 2019-03-12 RX ORDER — ONDANSETRON 8 MG/1
4 TABLET, FILM COATED ORAL EVERY 6 HOURS
Qty: 0 | Refills: 0 | Status: DISCONTINUED | OUTPATIENT
Start: 2019-03-12 | End: 2019-03-13

## 2019-03-12 RX ORDER — VANCOMYCIN HCL 1 G
1250 VIAL (EA) INTRAVENOUS EVERY 24 HOURS
Qty: 0 | Refills: 0 | Status: DISCONTINUED | OUTPATIENT
Start: 2019-03-12 | End: 2019-03-15

## 2019-03-12 RX ORDER — VANCOMYCIN HCL 1 G
1000 VIAL (EA) INTRAVENOUS ONCE
Qty: 0 | Refills: 0 | Status: COMPLETED | OUTPATIENT
Start: 2019-03-12 | End: 2019-03-12

## 2019-03-12 RX ORDER — SODIUM CHLORIDE 9 MG/ML
1000 INJECTION, SOLUTION INTRAVENOUS
Qty: 0 | Refills: 0 | Status: DISCONTINUED | OUTPATIENT
Start: 2019-03-12 | End: 2019-03-13

## 2019-03-12 RX ORDER — SENNA PLUS 8.6 MG/1
2 TABLET ORAL AT BEDTIME
Qty: 0 | Refills: 0 | Status: DISCONTINUED | OUTPATIENT
Start: 2019-03-12 | End: 2019-03-21

## 2019-03-12 RX ORDER — NALOXONE HYDROCHLORIDE 4 MG/.1ML
0.1 SPRAY NASAL
Qty: 0 | Refills: 0 | Status: DISCONTINUED | OUTPATIENT
Start: 2019-03-12 | End: 2019-03-13

## 2019-03-12 RX ORDER — AMLODIPINE BESYLATE 2.5 MG/1
5 TABLET ORAL DAILY
Qty: 0 | Refills: 0 | Status: DISCONTINUED | OUTPATIENT
Start: 2019-03-12 | End: 2019-03-19

## 2019-03-12 RX ORDER — HYDROMORPHONE HYDROCHLORIDE 2 MG/ML
0.3 INJECTION INTRAMUSCULAR; INTRAVENOUS; SUBCUTANEOUS
Qty: 0 | Refills: 0 | Status: DISCONTINUED | OUTPATIENT
Start: 2019-03-12 | End: 2019-03-13

## 2019-03-12 RX ORDER — MEPERIDINE HYDROCHLORIDE 50 MG/ML
12.5 INJECTION INTRAMUSCULAR; INTRAVENOUS; SUBCUTANEOUS
Qty: 0 | Refills: 0 | Status: DISCONTINUED | OUTPATIENT
Start: 2019-03-12 | End: 2019-03-13

## 2019-03-12 RX ORDER — SODIUM CHLORIDE 9 MG/ML
1000 INJECTION, SOLUTION INTRAVENOUS
Qty: 0 | Refills: 0 | Status: DISCONTINUED | OUTPATIENT
Start: 2019-03-12 | End: 2019-03-12

## 2019-03-12 RX ADMIN — PIPERACILLIN AND TAZOBACTAM 25 GRAM(S): 4; .5 INJECTION, POWDER, LYOPHILIZED, FOR SOLUTION INTRAVENOUS at 16:36

## 2019-03-12 RX ADMIN — NYSTATIN CREAM 1 APPLICATION(S): 100000 CREAM TOPICAL at 06:25

## 2019-03-12 RX ADMIN — SODIUM CHLORIDE 65 MILLILITER(S): 9 INJECTION, SOLUTION INTRAVENOUS at 15:22

## 2019-03-12 RX ADMIN — SODIUM CHLORIDE 75 MILLILITER(S): 9 INJECTION, SOLUTION INTRAVENOUS at 22:44

## 2019-03-12 RX ADMIN — HEPARIN SODIUM 5000 UNIT(S): 5000 INJECTION INTRAVENOUS; SUBCUTANEOUS at 06:25

## 2019-03-12 RX ADMIN — HYDROMORPHONE HYDROCHLORIDE 30 MILLILITER(S): 2 INJECTION INTRAMUSCULAR; INTRAVENOUS; SUBCUTANEOUS at 22:40

## 2019-03-12 RX ADMIN — Medication 250 MILLIGRAM(S): at 15:23

## 2019-03-12 RX ADMIN — Medication 400 MILLIGRAM(S): at 22:44

## 2019-03-12 NOTE — CONSULT NOTE ADULT - SUBJECTIVE AND OBJECTIVE BOX
UROLOGY CONSULTATION    SAMMI SANCHEZ  [id number]    [facility]  Patient is a 89y yo Male who is admitted after a fall  Urology consult requested for evaluation of a scrotal hematoma.    Patient seen and examined at bedside.   Patient unsure if he fell onto his scrotum  Reports his scrotal swelling started after a catheter was inserted into his bladder yesterday    Current complaints: none    Denies LUTs, dysuria, incomplete emptying, urgency, frequency, gross hematuria, fevers, chills, nausea, vomiting or weight loss    ROS  12 point ROS negative except that outlined in HPI    PMHX  DEMENTIA  No pertinent family history in first degree relatives  Cervical spondylosis with myelopathy and radiculopathy  Meningioma  No significant past medical history  Hypercholesterolemia  Hypertension  History of cataract surgery  Cataract extraction status of eye, left  NO SIGNIFICANT PAST SURGICAL HISTORY      MEDS  acetaminophen   Tablet .. 650 milliGRAM(s) Oral every 6 hours PRN  amLODIPine   Tablet 5 milliGRAM(s) Oral daily  docusate sodium 100 milliGRAM(s) Oral three times a day  heparin  Injectable 5000 Unit(s) SubCutaneous every 12 hours  metoprolol succinate  milliGRAM(s) Oral daily  nystatin Cream 1 Application(s) Topical two times a day  ondansetron Injectable 4 milliGRAM(s) IV Push every 6 hours PRN  senna 2 Tablet(s) Oral at bedtime PRN      Allergies  No Known Allergies        VITALS  T(C): 36.3 (03-12-19 @ 05:23), Max: 37 (03-11-19 @ 21:02)  HR: 65 (03-12-19 @ 05:23)  BP: 94/57 (03-12-19 @ 05:23)  RR: 18 (03-11-19 @ 21:02)  SpO2: 97% (03-12-19 @ 05:23)      Gen: elderly Male in NAD, well nourished  HEENT: normocephalic, anicteric sclera, moist mucus membranes; hearing intact  Chest: non labored breathing  Abd: soft, NT, ND, no masses  : uncircumcised phallus with phimosis, peno-scrotal edema, large (~5cm) left scrotal wall hematoma, unable to exam testicles due to edema, non tender scrotum, no perineal tenderness, fluctuance or crepitus, no suprapubic distension or tenderness  Psych: AAOx3, normal affect & mood  Ext: moves all four extremities freely, 2+ BLE edema, multiple skin echymosses    LABS  03-12    143  |  111<H>  |  64<H>  ----------------------------<  141<H>  3.7   |  22  |  1.76<H>    Ca    8.2<L>      12 Mar 2019 06:35  Mg     1.9     03-11    TPro  5.4<L>  /  Alb  2.0<L>  /  TBili  0.8  /  DBili  x   /  AST  43<H>  /  ALT  16  /  AlkPhos  90  03-11    CBC Full  -  ( 11 Mar 2019 07:22 )  WBC Count : 8.05 K/uL  Hemoglobin : 10.0 g/dL  Hematocrit : 29.5 %  Platelet Count - Automated : 179 K/uL  Mean Cell Volume : 97.0 fl  Mean Cell Hemoglobin : 32.9 pg  Mean Cell Hemoglobin Concentration : 33.9 gm/dL  Auto Neutrophil # : 6.76 K/uL  Auto Lymphocyte # : 0.89 K/uL  Auto Monocyte # : 0.40 K/uL  Auto Eosinophil # : 0.00 K/uL  Auto Basophil # : 0.00 K/uL  Auto Neutrophil % : 82.0 %  Auto Lymphocyte % : 11.0 %  Auto Monocyte % : 5.0 %  Auto Eosinophil % : 0.0 %  Auto Basophil % : 0.0 %      Culture - Blood (collected 10 Mar 2019 21:50)  Source: .Blood None  Preliminary Report (12 Mar 2019 01:02):    No growth to date.    Culture - Blood (collected 10 Mar 2019 21:50)  Source: .Blood None  Preliminary Report (12 Mar 2019 01:02):    No growth to date.    Culture - Urine (collected 10 Mar 2019 21:43)  Source: .Urine Catheterized  Preliminary Report (11 Mar 2019 21:28):    >100,000 CFU/ml Gram Negative Rods        RADIOLOGY    none    ASSESSMENT & PLAN      Scrotal swelling  scrotal wall hematoma  - Doubt mayelin's gangrene based on exam  - Obtain scrotal sono and CT pelvis (must include the entire scrotum)  - Scrotal elevation    UTI  - recommend broad spectrum antibiotics  - unsure if this Ucx is a clean specimen since has phimosis and difficult to find meatus  - Obtain bladder scan to check his PVR  - Although he does not have LUTs, recommend starting flomax for possible BPH with obstruction     will follow up after imaging complete      Thank you for allowing me to participate in the care of this patient  Please call if there are questions or concerns    Tootie Chamorro MD  [facility]  [phone]    03-12-19 @ 08:54

## 2019-03-12 NOTE — CONSULT NOTE ADULT - SUBJECTIVE AND OBJECTIVE BOX
Patient is a 89y old  Male who presents with a chief complaint of Recurrent Falls (12 Mar 2019 13:17)    HPI:  88 y/o M PMHx significant for hypertension, hyperlipidemia, meningioma, recurrent mechanical falls, and cervical myelopathy/radiculopathy presents to the ED for further evaluation of an unwitnessed fall last night. In the ED the patient was found to have notable skin tears to his right upper arm. The patient notes that the fall occurred upon ambulating to the bathroom and was unable to get up on his own. Labs => WBC 12.09, Hgb/Hct 11.1/32.8, LA 1.6, Alb 2.6, BUN/Cr 64/1.80, , TnI (-) x 3, Bands 17%. In the ED the patient received NS x 1.5L. (10 Mar 2019 23:00)Pt developed pain and  swelling of left scrotum with weeping skin and necrotic  area in the middle, no fever, denies trauma to the area. HD stable. No abdominal pain or distension, tolerating diet, known non incarcerated soft left inguinal hernia. Non obstructed on CT.  ROS:.  [X] A ten-point review of systems was otherwise negative except as noted.  Systemic:	[ ] Fever	[ ] Chills	[ ] Night sweats    [ ] Fatigue	[ ] Other  [] Cardiovascular:  [] Pulmonary:  [] Renal/Urologic:  [] Gastrointestinal: abdominal pain, vomiting  [] Metabolic:  [] Neurologic:  [] Hematologic:  [] ENT:  [] Ophthalmologic:  [] Musculoskeletal:    [ ] Due to altered mental status/intubation, subjective information were not able to be obtained from the patient. History was obtained, to the extent possible, from review of the chart and collateral sources of information.    PAST MEDICAL & SURGICAL HISTORY:  Cervical spondylosis with myelopathy and radiculopathy  Meningioma  Hypercholesterolemia  Hypertension  History of cataract surgery: Bilateral    FAMILY HISTORY:  No pertinent family history in first degree relatives    Social History:    Alcohol: Denied  Smoking: Denied  Drug Use: Denied        Allergies    No Known Allergies    Intolerances      MEDICATIONS  (STANDING):  amLODIPine   Tablet 5 milliGRAM(s) Oral daily  dextrose 5% + sodium chloride 0.9%. 1000 milliLiter(s) (65 mL/Hr) IV Continuous <Continuous>  docusate sodium 100 milliGRAM(s) Oral three times a day  heparin  Injectable 5000 Unit(s) SubCutaneous every 12 hours  metoprolol succinate  milliGRAM(s) Oral daily  nystatin Cream 1 Application(s) Topical two times a day  piperacillin/tazobactam IVPB. 3.375 Gram(s) IV Intermittent every 8 hours  vancomycin  IVPB 1000 milliGRAM(s) IV Intermittent once    Vital Signs Last 24 Hrs  T(C): 36.7 (12 Mar 2019 10:58), Max: 37 (11 Mar 2019 21:02)  T(F): 98 (12 Mar 2019 10:58), Max: 98.6 (11 Mar 2019 21:02)  HR: 94 (12 Mar 2019 10:58) (57 - 94)  BP: 119/77 (12 Mar 2019 10:58) (94/57 - 125/98)  BP(mean): --  RR: 18 (12 Mar 2019 10:58) (18 - 18)  SpO2: 98% (12 Mar 2019 10:58) (97% - 99%)  PHYSICAL EXAM:  Constitutional: NAD, GCS: 15/15  AOX3  Eyes:  WNL  ENMT:  WNL  Neck:  WNL, non tender  Back: Non tender  Respiratory: CTABL  Cardiovascular:  S1+S2+0  Gastrointestinal: Soft, ND , NT, soft, non strangulated LIH.  Genitourinary:  WNL  Extremities: NV intact  Vascular:  Intact  Neurological: No focal neurological deficit,  CN, motor and sensory system grossly intact.  Skin: scrotal edema, with 5x4 cm are of black skin involving mid and left of scrotal skin, mild crepitus.   Musculoskeletal: WNL  Psychiatric: Grossly WNL      Labs:                          10.0   8.05  )-----------( 179      ( 11 Mar 2019 07:22 )             29.5       03-12    143  |  111<H>  |  64<H>  ----------------------------<  141<H>  3.7   |  22  |  1.76<H>    Ca    8.2<L>      12 Mar 2019 06:35  Mg     1.9     -11    TPro  5.4<L>  /  Alb  2.0<L>  /  TBili  0.8  /  DBili  x   /  AST  43<H>  /  ALT  16  /  AlkPhos  90  03-11        Urinalysis Basic - ( 10 Mar 2019 21:43 )    Color: Yellow / Appearance: Slightly Turbid / S.015 / pH: x  Gluc: x / Ketone: Negative  / Bili: Negative / Urobili: 1 mg/dL   Blood: x / Protein: 15 mg/dL / Nitrite: Negative   Leuk Esterase: Small / RBC: 0-2 /HPF / WBC 0-2   Sq Epi: x / Non Sq Epi: Negative / Bacteria: Moderate      Radiology Results:    < from: CT Pelvis No Cont (19 @ 13:36) >    IMPRESSION:    Extensive soft tissue gas in the left scrotum suspicious for Teetee's   gangrene.  Left groin hernia containing nonobstructed sigmoid colon.  Partially visualized soft tissue mass surrounding the aorta suspicious   for metastatic lymphadenopathy.            < end of copied text >

## 2019-03-12 NOTE — CONSULT NOTE ADULT - NSICDXPROBLEM_GEN_ALL_CORE_FT
PROBLEM DIAGNOSES  Problem: Left inguinal hernia  Recommendation:     Problem: Teetee's gangrene  Recommendation:

## 2019-03-12 NOTE — PROGRESS NOTE ADULT - SUBJECTIVE AND OBJECTIVE BOX
CHIEF COMPLAINT/Diagnosis: lethargy/ weakness/ scrotal gangrene and necrotizing fascititis    SUBJECTIVE: no complaints    REVIEW OF SYSTEMS:    CONSTITUTIONAL: No weakness, fevers or chills  EYES/ENT: No visual changes;  No vertigo or throat pain   NECK: No pain or stiffness  RESPIRATORY: No cough, wheezing, hemoptysis; No shortness of breath  CARDIOVASCULAR: No chest pain or palpitations  GASTROINTESTINAL: No abdominal or epigastric pain. No nausea, vomiting, or hematemesis; No diarrhea or constipation. No melena or hematochezia.  GENITOURINARY: No dysuria, frequency or hematuria  NEUROLOGICAL: No numbness or weakness  SKIN: No itching, burning, rashes, or lesions   All other review of systems is negative unless indicated above    Vital Signs Last 24 Hrs  T(C): 36.7 (12 Mar 2019 10:58), Max: 37 (11 Mar 2019 21:02)  T(F): 98 (12 Mar 2019 10:58), Max: 98.6 (11 Mar 2019 21:02)  HR: 94 (12 Mar 2019 10:58) (57 - 94)  BP: 119/77 (12 Mar 2019 10:58) (94/57 - 125/98)  BP(mean): --  RR: 18 (12 Mar 2019 10:58) (18 - 18)  SpO2: 98% (12 Mar 2019 10:58) (97% - 99%)    I&O's Summary      CAPILLARY BLOOD GLUCOSE          PHYSICAL EXAM:    Constitutional: NAD, awake and alert, well-developed  HEENT: PERR, EOMI, Normal Hearing, MMM  Neck: Soft and supple, No LAD, No JVD  Respiratory: Breath sounds are clear bilaterally, No wheezing, rales or rhonchi  Cardiovascular: S1 and S2, regular rate and rhythm, no Murmurs, gallops or rubs  Gastrointestinal: Bowel Sounds present, soft, nontender, nondistended, no guarding, no rebound  Extremities: No peripheral edema  Vascular: 2+ peripheral pulses  Neurological: A/O x 3, no focal deficits  Musculoskeletal: 5/5 strength b/l upper and lower extremities  Skin: No rashes    MEDICATIONS:  MEDICATIONS  (STANDING):  amLODIPine   Tablet 5 milliGRAM(s) Oral daily  docusate sodium 100 milliGRAM(s) Oral three times a day  heparin  Injectable 5000 Unit(s) SubCutaneous every 12 hours  metoprolol succinate  milliGRAM(s) Oral daily  nystatin Cream 1 Application(s) Topical two times a day      LABS: All Labs Reviewed:                        10.0   8.05  )-----------( 179      ( 11 Mar 2019 07:22 )             29.5     03-12    143  |  111<H>  |  64<H>  ----------------------------<  141<H>  3.7   |  22  |  1.76<H>    Ca    8.2<L>      12 Mar 2019 06:35  Mg     1.9     03-11    TPro  5.4<L>  /  Alb  2.0<L>  /  TBili  0.8  /  DBili  x   /  AST  43<H>  /  ALT  16  /  AlkPhos  90  03-11    PT/INR - ( 10 Mar 2019 14:02 )   PT: 19.3 sec;   INR: 1.71 ratio         PTT - ( 10 Mar 2019 14:02 )  PTT:29.9 sec  CARDIAC MARKERS ( 10 Mar 2019 21:50 )  <0.015 ng/mL / x     / x     / x     / x      CARDIAC MARKERS ( 10 Mar 2019 19:29 )  <0.015 ng/mL / x     / x     / x     / x      CARDIAC MARKERS ( 10 Mar 2019 14:02 )  <0.015 ng/mL / x     / 572 U/L / x     / x          Blood Culture: 03-10 @ 21:50  Organism --  Gram Stain Blood -- Gram Stain --  Specimen Source .Blood None  Culture-Blood --    03-10 @ 21:43  Organism --  Gram Stain Blood -- Gram Stain --  Specimen Source .Urine Catheterized  Culture-Blood --      Assessment and Plan:   	  89 y/o M PMHx significant for hypertension, hyperlipidemia, meningioma, recurrent mechanical falls, and cervical myelopathy/radiculopathy presents to the ED for further evaluation of an unwitnessed fall last night. In the ED the patient was found to have notable skin tears to his right upper arm. The patient notes that the fall occurred upon ambulating to the bathroom and was unable to get up on his own. Labs => WBC 12.09, Hgb/Hct 11.1/32.8, LA 1.6, Alb 2.6, BUN/Cr 64/1.80, , TnI (-) x 3, Bands 17%. In the ED the patient received NS x 1.5L.    1)Falls in the Elderly  -yadira secondary to dehydration   -c/w iv fluids, feeling some improvment after fluids  -physical therapy to see patient    2)JAMIE on CKD  ~DDx includes pre-renal azotemia vs ATN  ~cont. trial IV hydration  ~f/u urine studies  ~strict I/Os  ~will hold Furosemide for now (takes 20mg po daily)    3)Leukocytosis with Bandemia  -UA is clean, xray does not show any clear infiltrates or pna  -likley comming from scrotal necrosis    4)HTN  ~cont. Amlodipine 10mg po daily  ~cont. Lisinopril 40mg po daily  ~cont. Metoprolol XL 100mg po daily    5)Malnutrition  ~f/u w/ Nutrition consultation in the am    6)Vte ppx  -hep sc    7-Scrotal gangrene gas and yadira underlying fascitis?  -u/s shows gas in the scrotum  -will start Zosyn  -Discussed with urology, patient to get CT  pelvis today, and then yadira will go to OR  -NPO for possible OR CHIEF COMPLAINT/Diagnosis: lethargy/ weakness/ scrotal gangrene and necrotizing fascititis    SUBJECTIVE: no complaints    REVIEW OF SYSTEMS:    CONSTITUTIONAL: No weakness, fevers or chills  EYES/ENT: No visual changes;  No vertigo or throat pain   NECK: No pain or stiffness  RESPIRATORY: No cough, wheezing, hemoptysis; No shortness of breath  CARDIOVASCULAR: No chest pain or palpitations  GASTROINTESTINAL: No abdominal or epigastric pain. No nausea, vomiting, or hematemesis; No diarrhea or constipation. No melena or hematochezia.  GENITOURINARY: No dysuria, frequency or hematuria  NEUROLOGICAL: No numbness or weakness  SKIN: No itching, burning, rashes, or lesions   All other review of systems is negative unless indicated above    Vital Signs Last 24 Hrs  T(C): 36.7 (12 Mar 2019 10:58), Max: 37 (11 Mar 2019 21:02)  T(F): 98 (12 Mar 2019 10:58), Max: 98.6 (11 Mar 2019 21:02)  HR: 94 (12 Mar 2019 10:58) (57 - 94)  BP: 119/77 (12 Mar 2019 10:58) (94/57 - 125/98)  BP(mean): --  RR: 18 (12 Mar 2019 10:58) (18 - 18)  SpO2: 98% (12 Mar 2019 10:58) (97% - 99%)    I&O's Summary      CAPILLARY BLOOD GLUCOSE          PHYSICAL EXAM:    Constitutional: NAD, awake and alert, well-developed  HEENT: PERR, EOMI, Normal Hearing, MMM  Neck: Soft and supple, No LAD, No JVD  Respiratory: Breath sounds are clear bilaterally, No wheezing, rales or rhonchi  Cardiovascular: S1 and S2, regular rate and rhythm, no Murmurs, gallops or rubs  Gastrointestinal: Bowel Sounds present, soft, nontender, nondistended, no guarding, no rebound  Extremities: No peripheral edema  Vascular: 2+ peripheral pulses  Neurological: A/O x 3, no focal deficits  Musculoskeletal: 5/5 strength b/l upper and lower extremities  Skin: No rashes    MEDICATIONS:  MEDICATIONS  (STANDING):  amLODIPine   Tablet 5 milliGRAM(s) Oral daily  docusate sodium 100 milliGRAM(s) Oral three times a day  heparin  Injectable 5000 Unit(s) SubCutaneous every 12 hours  metoprolol succinate  milliGRAM(s) Oral daily  nystatin Cream 1 Application(s) Topical two times a day      LABS: All Labs Reviewed:                        10.0   8.05  )-----------( 179      ( 11 Mar 2019 07:22 )             29.5     03-12    143  |  111<H>  |  64<H>  ----------------------------<  141<H>  3.7   |  22  |  1.76<H>    Ca    8.2<L>      12 Mar 2019 06:35  Mg     1.9     03-11    TPro  5.4<L>  /  Alb  2.0<L>  /  TBili  0.8  /  DBili  x   /  AST  43<H>  /  ALT  16  /  AlkPhos  90  03-11    PT/INR - ( 10 Mar 2019 14:02 )   PT: 19.3 sec;   INR: 1.71 ratio         PTT - ( 10 Mar 2019 14:02 )  PTT:29.9 sec  CARDIAC MARKERS ( 10 Mar 2019 21:50 )  <0.015 ng/mL / x     / x     / x     / x      CARDIAC MARKERS ( 10 Mar 2019 19:29 )  <0.015 ng/mL / x     / x     / x     / x      CARDIAC MARKERS ( 10 Mar 2019 14:02 )  <0.015 ng/mL / x     / 572 U/L / x     / x          Blood Culture: 03-10 @ 21:50  Organism --  Gram Stain Blood -- Gram Stain --  Specimen Source .Blood None  Culture-Blood --    03-10 @ 21:43  Organism --  Gram Stain Blood -- Gram Stain --  Specimen Source .Urine Catheterized  Culture-Blood --      Assessment and Plan:   	  88 y/o M PMHx significant for hypertension, hyperlipidemia, meningioma, recurrent mechanical falls, and cervical myelopathy/radiculopathy presents to the ED for further evaluation of an unwitnessed fall last night. In the ED the patient was found to have notable skin tears to his right upper arm. The patient notes that the fall occurred upon ambulating to the bathroom and was unable to get up on his own. Labs => WBC 12.09, Hgb/Hct 11.1/32.8, LA 1.6, Alb 2.6, BUN/Cr 64/1.80, , TnI (-) x 3, Bands 17%. In the ED the patient received NS x 1.5L.    1)Falls in the Elderly  -chalinoalana secondary to dehydration   -c/w iv fluids, feeling some improvment after fluids  -physical therapy to see patient    2)JAMIE on CKD  ~DDx includes pre-renal azotemia vs ATN  ~cont. trial IV hydration  ~f/u urine studies  ~strict I/Os  ~will hold Furosemide for now (takes 20mg po daily)  -hold ACE    3)Leukocytosis with Bandemia  -UA is clean, xray does not show any clear infiltrates or pna  -likley comming from scrotal necrosis    4)HTN  ~cont. Amlodipine 10mg po daily  ~cont. Lisinopril 40mg po daily  ~cont. Metoprolol XL 100mg po daily    5)Malnutrition  ~f/u w/ Nutrition consultation in the am    6)Vte ppx  -hep sc    7-Scrotal gangrene gas and yadira underlying fascitis?  -u/s shows gas in the scrotum  -will start Zosyn  -Discussed with urology, patient to get CT  pelvis today, and then yadira will go to OR  -NPO for possible OR CHIEF COMPLAINT/Diagnosis: lethargy/ weakness/ scrotal gangrene and necrotizing fascititis    SUBJECTIVE: no complaints    REVIEW OF SYSTEMS:    CONSTITUTIONAL: No weakness, fevers or chills  EYES/ENT: No visual changes;  No vertigo or throat pain   NECK: No pain or stiffness  RESPIRATORY: No cough, wheezing, hemoptysis; No shortness of breath  CARDIOVASCULAR: No chest pain or palpitations  GASTROINTESTINAL: No abdominal or epigastric pain. No nausea, vomiting, or hematemesis; No diarrhea or constipation. No melena or hematochezia.  GENITOURINARY: No dysuria, frequency or hematuria  NEUROLOGICAL: No numbness or weakness  SKIN: No itching, burning, rashes, or lesions   All other review of systems is negative unless indicated above    Vital Signs Last 24 Hrs  T(C): 36.7 (12 Mar 2019 10:58), Max: 37 (11 Mar 2019 21:02)  T(F): 98 (12 Mar 2019 10:58), Max: 98.6 (11 Mar 2019 21:02)  HR: 94 (12 Mar 2019 10:58) (57 - 94)  BP: 119/77 (12 Mar 2019 10:58) (94/57 - 125/98)  BP(mean): --  RR: 18 (12 Mar 2019 10:58) (18 - 18)  SpO2: 98% (12 Mar 2019 10:58) (97% - 99%)    I&O's Summary      CAPILLARY BLOOD GLUCOSE          PHYSICAL EXAM:    Constitutional: NAD, awake and alert, well-developed  HEENT: PERR, EOMI, Normal Hearing, MMM  Neck: Soft and supple, No LAD, No JVD  Respiratory: Breath sounds are clear bilaterally, No wheezing, rales or rhonchi  Cardiovascular: S1 and S2, regular rate and rhythm, no Murmurs, gallops or rubs  Gastrointestinal: Bowel Sounds present, soft, nontender, nondistended, no guarding, no rebound  Extremities: No peripheral edema  Vascular: 2+ peripheral pulses  Neurological: A/O x 3, no focal deficits  Musculoskeletal: 5/5 strength b/l upper and lower extremities  Skin: No rashes    MEDICATIONS:  MEDICATIONS  (STANDING):  amLODIPine   Tablet 5 milliGRAM(s) Oral daily  docusate sodium 100 milliGRAM(s) Oral three times a day  heparin  Injectable 5000 Unit(s) SubCutaneous every 12 hours  metoprolol succinate  milliGRAM(s) Oral daily  nystatin Cream 1 Application(s) Topical two times a day      LABS: All Labs Reviewed:                        10.0   8.05  )-----------( 179      ( 11 Mar 2019 07:22 )             29.5     03-12    143  |  111<H>  |  64<H>  ----------------------------<  141<H>  3.7   |  22  |  1.76<H>    Ca    8.2<L>      12 Mar 2019 06:35  Mg     1.9     03-11    TPro  5.4<L>  /  Alb  2.0<L>  /  TBili  0.8  /  DBili  x   /  AST  43<H>  /  ALT  16  /  AlkPhos  90  03-11    PT/INR - ( 10 Mar 2019 14:02 )   PT: 19.3 sec;   INR: 1.71 ratio         PTT - ( 10 Mar 2019 14:02 )  PTT:29.9 sec  CARDIAC MARKERS ( 10 Mar 2019 21:50 )  <0.015 ng/mL / x     / x     / x     / x      CARDIAC MARKERS ( 10 Mar 2019 19:29 )  <0.015 ng/mL / x     / x     / x     / x      CARDIAC MARKERS ( 10 Mar 2019 14:02 )  <0.015 ng/mL / x     / 572 U/L / x     / x          Blood Culture: 03-10 @ 21:50  Organism --  Gram Stain Blood -- Gram Stain --  Specimen Source .Blood None  Culture-Blood --    03-10 @ 21:43  Organism --  Gram Stain Blood -- Gram Stain --  Specimen Source .Urine Catheterized  Culture-Blood --      Assessment and Plan:   	  89 y/o M PMHx significant for hypertension, hyperlipidemia, meningioma, recurrent mechanical falls, and cervical myelopathy/radiculopathy presents to the ED for further evaluation of an unwitnessed fall last night. In the ED the patient was found to have notable skin tears to his right upper arm. The patient notes that the fall occurred upon ambulating to the bathroom and was unable to get up on his own. Labs => WBC 12.09, Hgb/Hct 11.1/32.8, LA 1.6, Alb 2.6, BUN/Cr 64/1.80, , TnI (-) x 3, Bands 17%. In the ED the patient received NS x 1.5L.    1)Falls in the Elderly  -yadira secondary to dehydration   -c/w iv fluids, feeling some improvment after fluids  -physical therapy to see patient    2)JAMIE on CKD  ~DDx includes pre-renal azotemia vs ATN  ~cont. trial IV hydration  ~f/u urine studies  ~strict I/Os  ~will hold Furosemide for now (takes 20mg po daily)  -hold ACE    3)Leukocytosis with Bandemia  -UA is clean, xray does not show any clear infiltrates or pna  -likley comming from scrotal necrosis    4)HTN  ~cont. Amlodipine 10mg po daily  ~cont. Lisinopril 40mg po daily  ~cont. Metoprolol XL 100mg po daily    5)Malnutrition  ~f/u w/ Nutrition consultation in the am    6)Vte ppx  -hep sc    7-Scrotal gangrene gas and yadira underlying fascitis?  -u/s shows gas in the scrotum  -will start Zosyn  -Discussed with urology, patient to get CT  pelvis today, and then yadira will go to OR  -NPO for possible OR         patient understands his prognosis and understands that surgery is needed. He understands that wihtout surgery, he has a high risk of decompensation and possible death resulting from necrotizing fascitis/ sepsis. At this time he states he wants surgical intervention if it will save his life.     note: disregard prevoius documentation on patient with dementia; he does not have dementia.

## 2019-03-12 NOTE — PROGRESS NOTE ADULT - SUBJECTIVE AND OBJECTIVE BOX
US scrotum and CT pelvis images reviewed    Patient has Extensive soft tissue gas in the left scrotum suspicious for Teetee's   gangrene. Left groin hernia containing nonobstructed sigmoid colon.    Discussed with patient over the phone these results and implications of Teetee's gangrene  I recommended he undergo urgent surgical debridement tonight. Alternative is to continue with IV antbx since he is stable however his course may be protracted and recovery will be longer. As well he may become more ill if not treated surgically.  Patient is agreeable to undergo debridement in the OR.  Risks including worsening infection, bleeding, second intervention, plastic operation for closure of wound and possible bowel/hernia operation discussed.    Dr Mazariegos from general surgery has been notified and will be available to assist in the OR.    No family is available via phone or at bedside to discuss these results  Patient is AAOx3 and makes his own decisions    PLAN  -c/w IV antbx, NPO, IVF  - OR today for surgical debridement    Dr Quiles aware

## 2019-03-12 NOTE — CONSULT NOTE ADULT - ASSESSMENT
89 y old male  with multiple medical problems, with Teetee's gangrene of left scrotum, LIH, non incarcerated non obstructed    NPO  IV hydration  IV antibiotics  DVT/GI prophylaxis  Hold any A/C   Surgical management of scrotum per Urology , Pt may end up with exposed testis or need an orchiectomy, I will be available if hernia contents are involved in surgical debridement field, explained t to the pt he  may need multiple debridement with open wound and long wound care hernia repair if required will be primary repair no mes will be used in infected field, high risk of recurrence discussed.   D/W Urology and medicine.

## 2019-03-12 NOTE — CHART NOTE - NSCHARTNOTEFT_GEN_A_CORE
Notified by RN, Pt has a ?necrotic tissue at the scrotum    Pt was seen and examined. Pt was asleep. Vitals has been stable.     PE  VSS  Scrotum is swollen. mild blood weeping from the scrotum noted. Possible necrotic tissue (appears superfical) due to possibly scratching noted.     A/P:  Scrotal Necrotic tissue & hematoma  - US of the scrotum  - Nystatin cream BID  - Uro consult     d/w Dr. Dennis, PGY3 and RN

## 2019-03-13 LAB
CULTURE RESULTS: SIGNIFICANT CHANGE UP
ORGANISM # SPEC MICROSCOPIC CNT: SIGNIFICANT CHANGE UP
ORGANISM # SPEC MICROSCOPIC CNT: SIGNIFICANT CHANGE UP
S PNEUM AG UR QL: NEGATIVE — SIGNIFICANT CHANGE UP
SPECIMEN SOURCE: SIGNIFICANT CHANGE UP

## 2019-03-13 PROCEDURE — 99024 POSTOP FOLLOW-UP VISIT: CPT

## 2019-03-13 RX ORDER — MORPHINE SULFATE 50 MG/1
1 CAPSULE, EXTENDED RELEASE ORAL EVERY 6 HOURS
Qty: 0 | Refills: 0 | Status: DISCONTINUED | OUTPATIENT
Start: 2019-03-13 | End: 2019-03-17

## 2019-03-13 RX ORDER — ACETAMINOPHEN 500 MG
650 TABLET ORAL EVERY 6 HOURS
Qty: 0 | Refills: 0 | Status: DISCONTINUED | OUTPATIENT
Start: 2019-03-13 | End: 2019-03-27

## 2019-03-13 RX ORDER — TRAMADOL HYDROCHLORIDE 50 MG/1
50 TABLET ORAL EVERY 4 HOURS
Qty: 0 | Refills: 0 | Status: DISCONTINUED | OUTPATIENT
Start: 2019-03-13 | End: 2019-03-17

## 2019-03-13 RX ORDER — TRAMADOL HYDROCHLORIDE 50 MG/1
25 TABLET ORAL EVERY 4 HOURS
Qty: 0 | Refills: 0 | Status: DISCONTINUED | OUTPATIENT
Start: 2019-03-13 | End: 2019-03-17

## 2019-03-13 RX ORDER — SODIUM HYPOCHLORITE 0.125 %
1 SOLUTION, NON-ORAL MISCELLANEOUS DAILY
Qty: 0 | Refills: 0 | Status: DISCONTINUED | OUTPATIENT
Start: 2019-03-13 | End: 2019-03-14

## 2019-03-13 RX ADMIN — Medication 100 MILLIGRAM(S): at 15:34

## 2019-03-13 RX ADMIN — PIPERACILLIN AND TAZOBACTAM 25 GRAM(S): 4; .5 INJECTION, POWDER, LYOPHILIZED, FOR SOLUTION INTRAVENOUS at 22:38

## 2019-03-13 RX ADMIN — HEPARIN SODIUM 5000 UNIT(S): 5000 INJECTION INTRAVENOUS; SUBCUTANEOUS at 18:14

## 2019-03-13 RX ADMIN — Medication 166.67 MILLIGRAM(S): at 05:26

## 2019-03-13 RX ADMIN — Medication 1 APPLICATION(S): at 09:30

## 2019-03-13 RX ADMIN — Medication 100 MILLIGRAM(S): at 22:39

## 2019-03-13 RX ADMIN — PIPERACILLIN AND TAZOBACTAM 25 GRAM(S): 4; .5 INJECTION, POWDER, LYOPHILIZED, FOR SOLUTION INTRAVENOUS at 15:35

## 2019-03-13 RX ADMIN — Medication 100 MILLIGRAM(S): at 05:26

## 2019-03-13 RX ADMIN — HEPARIN SODIUM 5000 UNIT(S): 5000 INJECTION INTRAVENOUS; SUBCUTANEOUS at 05:25

## 2019-03-13 RX ADMIN — PIPERACILLIN AND TAZOBACTAM 25 GRAM(S): 4; .5 INJECTION, POWDER, LYOPHILIZED, FOR SOLUTION INTRAVENOUS at 05:27

## 2019-03-13 NOTE — CONSULT NOTE ADULT - ASSESSMENT
89 y/o Male with h/o hypertension, hyperlipidemia, meningioma, recurrent mechanical falls, cervical myelopathy/radiculopathy was admitted on 3/12 for increased weakness and unwitnessed fall. In the ED the patient was found to have skin tears to his right upper arm. The patient notes that the fall occurred upon ambulating to the bathroom and was unable to get up on his own. In the ED the patient received zosyn and vancomycin IV.    1. Sepsis with GNR. UTI with Ecoli. Scrotal cellulitis with likely underlying Teetee's gangrene s/p surgery. CRF stage 3.   -obtain BC x 2  -wound cultures collected in OR  -start zosyn 3.375 gm IV q8h and vancomycin 1250 mg IV qd  -reason for abx use and side effects reviewed with patient; monitor BMP and vancomycin trough levels   -local wound care  -old chart reviewed to assess prior cultures  -monitor temps  -f/u CBC  -supportive care  2. Other issues:   -care per medicine 89 y/o Male with h/o hypertension, hyperlipidemia, meningioma, recurrent mechanical falls, cervical myelopathy/radiculopathy was admitted on 3/12 for increased weakness and unwitnessed fall. In the ED the patient was found to have skin tears to his right upper arm. The patient notes that the fall occurred upon ambulating to the bathroom and was unable to get up on his own. In the ED the patient received zosyn and vancomycin IV.    1. Sepsis with GNR. UTI with Ecoli. Scrotal cellulitis with likely underlying Teetee's gangrene s/p surgery. CRF stage 3.   -obtain BC x 2  -wound cultures collected in OR  -start zosyn 3.375 gm IV q8h and vancomycin 1250 mg IV qd  -reason for abx use and side effects reviewed with patient; monitor BMP and vancomycin trough levels   -urology evaluation appreciated  -local wound care  -old chart reviewed to assess prior cultures  -monitor temps  -f/u CBC  -supportive care  2. Other issues:   -care per medicine

## 2019-03-13 NOTE — CONSULT NOTE ADULT - SUBJECTIVE AND OBJECTIVE BOX
Patient is a 89y old  Male who presents with a chief complaint of Recurrent Falls     HPI:  88 y/o Male with h/o hypertension, hyperlipidemia, meningioma, recurrent mechanical falls, cervical myelopathy/radiculopathy was admitted on 3/12 for increased weakness and unwitnessed fall. In the ED the patient was found to have skin tears to his right upper arm. The patient notes that the fall occurred upon ambulating to the bathroom and was unable to get up on his own. In the ED the patient received zosyn and vancomycin IV.    He was noted with extensive scrotal edema and underwent surgery that showed extensive necrotic tissue of perineum, scrotum, and penile shaft.    PMH: as above  PSH: as above  Meds: per reconciliation sheet, noted below  MEDICATIONS  (STANDING):  amLODIPine   Tablet 5 milliGRAM(s) Oral daily  Dakins Solution - 1/2 Strength 1 Application(s) Topical daily  dextrose 5% + sodium chloride 0.9%. 1000 milliLiter(s) (65 mL/Hr) IV Continuous <Continuous>  docusate sodium 100 milliGRAM(s) Oral three times a day  heparin  Injectable 5000 Unit(s) SubCutaneous every 12 hours  HYDROmorphone PCA (1 mG/mL) 30 milliLiter(s) PCA Continuous PCA Continuous  metoprolol succinate  milliGRAM(s) Oral daily  piperacillin/tazobactam IVPB. 3.375 Gram(s) IV Intermittent every 8 hours  vancomycin  IVPB 1250 milliGRAM(s) IV Intermittent every 24 hours    MEDICATIONS  (PRN):  acetaminophen   Tablet .. 650 milliGRAM(s) Oral every 6 hours PRN Temp greater or equal to 38C (100.4F), Mild Pain (1 - 3)  HYDROmorphone PCA (1 mG/mL) Rescue Clinician Bolus 0.3 milliGRAM(s) IV Push every 15 minutes PRN for Pain Scale GREATER THAN 6  naloxone Injectable 0.1 milliGRAM(s) IV Push every 3 minutes PRN For ANY of the following changes in patient status:  A. RR LESS THAN 10 breaths per minute, B. Oxygen saturation LESS THAN 90%, C. Sedation score of 6  ondansetron Injectable 4 milliGRAM(s) IV Push every 6 hours PRN Nausea  senna 2 Tablet(s) Oral at bedtime PRN Constipation    Allergies    No Known Allergies    Intolerances      Social: no smoking, no alcohol, no illegal drugs; no recent travel, no exposure to TB  FAMILY HISTORY:  No pertinent family history in first degree relatives    no history of premature cardiovascular disease in first degree relatives    ROS: the patient denies fever, no chills, no HA, no seizures, no dizziness, no sore throat, no nasal congestion, no blurry vision, no CP, no palpitations, no SOB, no cough, no abdominal pain, no diarrhea, no N/V, has dysuria, no leg pain, no claudication, no rash, no joint aches, no rectal pain or bleeding, no night sweats; has increased weakness  All other systems reviewed and are negative    Vital Signs Last 24 Hrs  T(C): 36.4 (13 Mar 2019 09:14), Max: 36.7 (12 Mar 2019 10:58)  T(F): 97.6 (13 Mar 2019 09:14), Max: 98 (12 Mar 2019 10:58)  HR: 56 (13 Mar 2019 08:00) (51 - 94)  BP: 107/59 (13 Mar 2019 08:00) (91/55 - 119/77)  BP(mean): 70 (13 Mar 2019 08:00) (62 - 75)  RR: 21 (13 Mar 2019 08:00) (13 - 24)  SpO2: 100% (13 Mar 2019 08:00) (96% - 100%)  Daily     Daily     PE:    Constitutional: frail looking  HEENT: NC/AT, EOMI, PERRLA, conjunctivae clear; ears and nose atraumatic; pharynx benign  Neck: supple; thyroid not palpable  Back: no tenderness  Respiratory: respiratory effort normal; clear to auscultation  Cardiovascular: S1S2 regular, no murmurs  Abdomen: soft, not tender, not distended, positive BS; no liver or spleen organomegaly  Genitourinary: no suprapubic tenderness; scrotal edema and erythema s/p surgery  Lymphatic: no LN palpable  Musculoskeletal: no muscle tenderness, no joint swelling or tenderness  Extremities: no pedal edema  Neurological/ Psychiatric: AxOx3, judgement and insight normal; moving all extremities  Skin: no rashes; no palpable lesions    Labs: all available labs reviewed    03-12    143  |  111<H>  |  64<H>  ----------------------------<  141<H>  3.7   |  22  |  1.76<H>    Ca    8.2<L>      12 Mar 2019 06:35      Culture - Blood (collected 10 Mar 2019 21:50)  Source: .Blood None  Preliminary Report (12 Mar 2019 01:02):    No growth to date.    Culture - Blood (collected 10 Mar 2019 21:50)  Source: .Blood None  Gram Stain (12 Mar 2019 17:26):    Growth in anaerobic bottle: Gram Negative Rods  Preliminary Report (12 Mar 2019 17:26):    Growth in anaerobic bottle: Gram Negative Rods      Culture - Urine (collected 10 Mar 2019 21:43)  Source: .Urine Catheterized  Final Report (12 Mar 2019 19:14):    >100,000 CFU/ml Escherichia coli  Organism: Escherichia coli (12 Mar 2019 19:14)  Organism: Escherichia coli (12 Mar 2019 19:14)      -  Amikacin: S <=16      -  Ampicillin: S <=8 These ampicillin results predict results for amoxicillin      -  Ampicillin/Sulbactam: S <=8/4      -  Aztreonam: S <=4      -  Cefazolin: S <=8 For uncomplicated UTI with K. pneumoniae, E. coli, or P. mirablis: JANESSA <=16 is sensitive and JANESSA >=32 is resistant. This also predicts results for oral agents cefaclor, cefdinir, cefpodoxime, cefprozil, cefuroxime axetil, cephalexin and locarbef for uncomplicated UTI. Note that some isolates may be susceptible to these agents while testing resistant to cefazolin.      -  Cefepime: S <=4      -  Cefoxitin: S <=8      -  Ceftriaxone: S <=1 Enterobacter, Citrobacter, and Serratia may develop resistance during prolonged therapy      -  Ciprofloxacin: S <=1      -  Ertapenem: S <=1      -  Gentamicin: S <=4      -  Imipenem: S <=1      -  Levofloxacin: S <=2      -  Meropenem: S <=1      -  Nitrofurantoin: S <=32 Should not be used to treat pyelonephritis      -  Piperacillin/Tazobactam: S <=16      -  Tigecycline: S <=2      -  Tobramycin: S <=4      -  Trimethoprim/Sulfamethoxazole: S <=2/38      Method Type: JANESSA      Radiology: all available radiological tests reviewed    < from: CT Pelvis No Cont (03.12.19 @ 13:36) >  Extensive soft tissue gas in the left scrotum suspicious for Teetee's   gangrene.  Left groin hernia containing nonobstructed sigmoid colon.  Partially visualized soft tissue mass surrounding the aorta suspicious   for metastatic lymphadenopathy.    < end of copied text >      Advanced directives addressed: full resuscitation Patient is a 89y old  Male who presents with a chief complaint of Recurrent Falls     HPI:  89 y/o Male with h/o hypertension, hyperlipidemia, meningioma, recurrent mechanical falls, cervical myelopathy/radiculopathy was admitted on 3/12 for increased weakness and unwitnessed fall. In the ED the patient was found to have skin tears to his right upper arm. The patient notes that the fall occurred upon ambulating to the bathroom and was unable to get up on his own. In the ED the patient received zosyn and vancomycin IV.    He was noted with extensive scrotal edema and underwent surgery that showed extensive necrotic tissue of perineum, scrotum, and penile shaft.    PMH: as above  PSH: as above  Meds: per reconciliation sheet, noted below  MEDICATIONS  (STANDING):  amLODIPine   Tablet 5 milliGRAM(s) Oral daily  Dakins Solution - 1/2 Strength 1 Application(s) Topical daily  dextrose 5% + sodium chloride 0.9%. 1000 milliLiter(s) (65 mL/Hr) IV Continuous <Continuous>  docusate sodium 100 milliGRAM(s) Oral three times a day  heparin  Injectable 5000 Unit(s) SubCutaneous every 12 hours  HYDROmorphone PCA (1 mG/mL) 30 milliLiter(s) PCA Continuous PCA Continuous  metoprolol succinate  milliGRAM(s) Oral daily  piperacillin/tazobactam IVPB. 3.375 Gram(s) IV Intermittent every 8 hours  vancomycin  IVPB 1250 milliGRAM(s) IV Intermittent every 24 hours    MEDICATIONS  (PRN):  acetaminophen   Tablet .. 650 milliGRAM(s) Oral every 6 hours PRN Temp greater or equal to 38C (100.4F), Mild Pain (1 - 3)  HYDROmorphone PCA (1 mG/mL) Rescue Clinician Bolus 0.3 milliGRAM(s) IV Push every 15 minutes PRN for Pain Scale GREATER THAN 6  naloxone Injectable 0.1 milliGRAM(s) IV Push every 3 minutes PRN For ANY of the following changes in patient status:  A. RR LESS THAN 10 breaths per minute, B. Oxygen saturation LESS THAN 90%, C. Sedation score of 6  ondansetron Injectable 4 milliGRAM(s) IV Push every 6 hours PRN Nausea  senna 2 Tablet(s) Oral at bedtime PRN Constipation    Allergies    No Known Allergies    Intolerances      Social: no smoking, no alcohol, no illegal drugs; no recent travel, no exposure to TB  FAMILY HISTORY:  No pertinent family history in first degree relatives    no history of premature cardiovascular disease in first degree relatives    ROS: the patient denies fever, no chills, no HA, no seizures, no dizziness, no sore throat, no nasal congestion, no blurry vision, no CP, no palpitations, no SOB, no cough, no abdominal pain, no diarrhea, no N/V, has dysuria, has perineal pain; no leg pain, no joint aches, no rectal pain or bleeding, no night sweats; has increased weakness  All other systems reviewed and are negative    Vital Signs Last 24 Hrs  T(C): 36.4 (13 Mar 2019 09:14), Max: 36.7 (12 Mar 2019 10:58)  T(F): 97.6 (13 Mar 2019 09:14), Max: 98 (12 Mar 2019 10:58)  HR: 56 (13 Mar 2019 08:00) (51 - 94)  BP: 107/59 (13 Mar 2019 08:00) (91/55 - 119/77)  BP(mean): 70 (13 Mar 2019 08:00) (62 - 75)  RR: 21 (13 Mar 2019 08:00) (13 - 24)  SpO2: 100% (13 Mar 2019 08:00) (96% - 100%)  Daily     Daily     PE:    Constitutional: frail looking  HEENT: NC/AT, EOMI, PERRLA, conjunctivae clear; ears and nose atraumatic; pharynx benign  Neck: supple; thyroid not palpable  Back: no tenderness  Respiratory: respiratory effort normal; decreased BS at bases  Cardiovascular: S1S2 regular, no murmurs  Abdomen: soft, not tender, not distended, positive BS; no liver or spleen organomegaly  Genitourinary: no suprapubic tenderness; scrotal edema and erythema and open wound s/p surgery  Lymphatic: no LN palpable  Musculoskeletal: no muscle tenderness, no joint swelling or tenderness  Extremities: no pedal edema  Neurological/ Psychiatric: AxOx3, judgement and insight normal; moving all extremities  Skin: no rashes; no palpable lesions    Labs: all available labs reviewed    03-12    143  |  111<H>  |  64<H>  ----------------------------<  141<H>  3.7   |  22  |  1.76<H>    Ca    8.2<L>      12 Mar 2019 06:35      Culture - Blood (collected 10 Mar 2019 21:50)  Source: .Blood None  Preliminary Report (12 Mar 2019 01:02):    No growth to date.    Culture - Blood (collected 10 Mar 2019 21:50)  Source: .Blood None  Gram Stain (12 Mar 2019 17:26):    Growth in anaerobic bottle: Gram Negative Rods  Preliminary Report (12 Mar 2019 17:26):    Growth in anaerobic bottle: Gram Negative Rods      Culture - Urine (collected 10 Mar 2019 21:43)  Source: .Urine Catheterized  Final Report (12 Mar 2019 19:14):    >100,000 CFU/ml Escherichia coli  Organism: Escherichia coli (12 Mar 2019 19:14)  Organism: Escherichia coli (12 Mar 2019 19:14)      -  Amikacin: S <=16      -  Ampicillin: S <=8 These ampicillin results predict results for amoxicillin      -  Ampicillin/Sulbactam: S <=8/4      -  Aztreonam: S <=4      -  Cefazolin: S <=8 For uncomplicated UTI with K. pneumoniae, E. coli, or P. mirablis: JANESSA <=16 is sensitive and JANESSA >=32 is resistant. This also predicts results for oral agents cefaclor, cefdinir, cefpodoxime, cefprozil, cefuroxime axetil, cephalexin and locarbef for uncomplicated UTI. Note that some isolates may be susceptible to these agents while testing resistant to cefazolin.      -  Cefepime: S <=4      -  Cefoxitin: S <=8      -  Ceftriaxone: S <=1 Enterobacter, Citrobacter, and Serratia may develop resistance during prolonged therapy      -  Ciprofloxacin: S <=1      -  Ertapenem: S <=1      -  Gentamicin: S <=4      -  Imipenem: S <=1      -  Levofloxacin: S <=2      -  Meropenem: S <=1      -  Nitrofurantoin: S <=32 Should not be used to treat pyelonephritis      -  Piperacillin/Tazobactam: S <=16      -  Tigecycline: S <=2      -  Tobramycin: S <=4      -  Trimethoprim/Sulfamethoxazole: S <=2/38      Method Type: JANESSA      Radiology: all available radiological tests reviewed    < from: CT Pelvis No Cont (03.12.19 @ 13:36) >  Extensive soft tissue gas in the left scrotum suspicious for Teetee's   gangrene.  Left groin hernia containing nonobstructed sigmoid colon.  Partially visualized soft tissue mass surrounding the aorta suspicious   for metastatic lymphadenopathy.    < end of copied text >    < from: US Testicles (03.12.19 @ 09:46) >  Diffuse scrotal wall emphysema suspicious for gas-forming infection.  Small complex left hydrocele.  Unremarkable testes.    < end of copied text >      Advanced directives addressed: full resuscitation

## 2019-03-13 NOTE — PROGRESS NOTE ADULT - SUBJECTIVE AND OBJECTIVE BOX
CC:Patient is a 89y old  Male who presents with a chief complaint of Recurrent Falls (13 Mar 2019 10:11)      Subjective:  Pt seen and examined at bedside with chaperone. Pt is awake, alert, cooperative, comfortable, in no acute distress. No reported c/o fever, chills, chest pain, SOB, abd pain, N/V/D, extremity pain or dysfunction, hemoptysis, hematemesis, hematuria, hematochexia, headache, diplopia, vertigo, dizzyness.     ROS:  as abovementioned ROS    Vital Signs Last 24 Hrs  T(C): 36.7 (13 Mar 2019 14:28), Max: 36.7 (12 Mar 2019 21:45)  T(F): 98.1 (13 Mar 2019 14:28), Max: 98.1 (13 Mar 2019 14:28)  HR: 54 (13 Mar 2019 15:00) (50 - 74)  BP: 100/46 (13 Mar 2019 14:00) (87/46 - 114/62)  BP(mean): 61 (13 Mar 2019 14:00) (56 - 84)  RR: 20 (13 Mar 2019 15:00) (13 - 24)  SpO2: 100% (13 Mar 2019 12:00) (96% - 100%)    Labs:      03-12    143  |  111<H>  |  64<H>  ----------------------------<  141<H>  3.7   |  22  |  1.76<H>    Ca    8.2<L>      12 Mar 2019 06:35              Meds:  acetaminophen   Tablet .. 650 milliGRAM(s) Oral every 6 hours PRN  acetaminophen   Tablet .. 650 milliGRAM(s) Oral every 6 hours PRN  amLODIPine   Tablet 5 milliGRAM(s) Oral daily  Dakins Solution - 1/2 Strength 1 Application(s) Topical daily  dextrose 5% + sodium chloride 0.9%. 1000 milliLiter(s) IV Continuous <Continuous>  docusate sodium 100 milliGRAM(s) Oral three times a day  heparin  Injectable 5000 Unit(s) SubCutaneous every 12 hours  metoprolol succinate  milliGRAM(s) Oral daily  morphine  - Injectable 1 milliGRAM(s) IV Push every 6 hours PRN  piperacillin/tazobactam IVPB. 3.375 Gram(s) IV Intermittent every 8 hours  senna 2 Tablet(s) Oral at bedtime PRN  traMADol 25 milliGRAM(s) Oral every 4 hours PRN  traMADol 50 milliGRAM(s) Oral every 4 hours PRN  vancomycin  IVPB 1250 milliGRAM(s) IV Intermittent every 24 hours      Radiology:  < from: CT Pelvis No Cont (03.12.19 @ 13:36) >      PROCEDURE DATE:  03/12/2019          INTERPRETATION:  Clinical information: Scrotal gas with likely gangrene    TECHNIQUE: CT of the pelvis and proximal thighs was performed without   intravenous or oral contrast.    COMPARISON: None    FINDINGS: There is extensive soft tissue gas in the left scrotum. Gas   extends superiorly to the base of the penis. There is no gas in the right   scrotum. Small bilateral hydroceles are noted as described on recent   ultrasound. There is a left groin hernia containing nonobstructed sigmoid   colon. There is a radiodense linear foreign object within the sigmoid   colon located within the hernia.    Partially visualized mass surrounds the distal aorta. The visualized   aorta and iliac arteries are normal in caliber. Colonic diverticulosis   noted without diverticulitis. Moderate sized umbilical hernia contains   nonobstructed small intestine. There is diffuse anasarca. No acute bony   abnormality is identified.    IMPRESSION:    Extensive soft tissue gas in the left scrotum suspicious for Teetee's   gangrene.  Left groin hernia containing nonobstructed sigmoid colon.  Partially visualized soft tissue mass surrounding the aorta suspicious   for metastatic lymphadenopathy.                EMILIA SANCHEZ   This document has been electronically signed. Mar 12 2019  2:00PM    < end of copied text >      Physical exam:  Pt in no acute distress  Resp: CTAB  CVS: S1S2(+)  ABD: bowel sounds (+), soft, non distended, no rebound, no guarding, no rigidity, no skin changes to exam.   : scrotal, penile, perineum debridement site is clean, dry, intact, no cellulitis, no d/c, no purulence, no fluctuance. (+) appropriate incisional tenderness to exam  EXT: no calf tenderness or edema to exam b/l, on VTE prophylaxis

## 2019-03-13 NOTE — PROGRESS NOTE ADULT - ASSESSMENT
A/P:  S/P debridement of scrotum, penis, perineun for fourniers gangrene  Management of wound care per Urology  Antibiotics per ID  GI/DVT prophylaxis  Pain control  Incentive spirometry  Serial abd exams  F/U labs  Pt will be monitored for signs of evolution/resolution of pathology and surgical intervention as required and warranted  Cont current care and meds

## 2019-03-13 NOTE — PROGRESS NOTE ADULT - SUBJECTIVE AND OBJECTIVE BOX
Patient seen and examined at bedside  Feels well  No fevers, chills, nausea, vomiting  Minimal pain  Dressing changed this AM by surgery team  Urine/blood cx positive      Medications  acetaminophen   Tablet .. 650 milliGRAM(s) Oral every 6 hours PRN  acetaminophen   Tablet .. 650 milliGRAM(s) Oral every 6 hours PRN  amLODIPine   Tablet 5 milliGRAM(s) Oral daily  Dakins Solution - 1/2 Strength 1 Application(s) Topical daily  dextrose 5% + sodium chloride 0.9%. 1000 milliLiter(s) IV Continuous <Continuous>  docusate sodium 100 milliGRAM(s) Oral three times a day  heparin  Injectable 5000 Unit(s) SubCutaneous every 12 hours  metoprolol succinate  milliGRAM(s) Oral daily  morphine  - Injectable 1 milliGRAM(s) IV Push every 6 hours PRN  piperacillin/tazobactam IVPB. 3.375 Gram(s) IV Intermittent every 8 hours  senna 2 Tablet(s) Oral at bedtime PRN  traMADol 25 milliGRAM(s) Oral every 4 hours PRN  traMADol 50 milliGRAM(s) Oral every 4 hours PRN  vancomycin  IVPB 1250 milliGRAM(s) IV Intermittent every 24 hours      ROS  General: No weight change, fevers, chills  Ophthalmologic: No eye pain,  swelling,  ENMT: No hearing changes,  ear pain  Respiratory and Thorax: No cough, sputum, dyspnea, wheezing  Cardiovascular: No chest pain, SOB  Gastrointestinal:	No diarrhea, constipation, nausea, vomiting,  Genitourinary: see above  Neurological: No syncope, loss of consciousness, headache, dizziness  Psychiatric: No SI/HI/AH/VH.  Endocrine: No temperature intolerance    Vital Signs Last 24 Hrs  T(C): 36.7 (13 Mar 2019 14:28), Max: 36.7 (12 Mar 2019 21:45)  T(F): 98.1 (13 Mar 2019 14:28), Max: 98.1 (13 Mar 2019 14:28)  HR: 51 (13 Mar 2019 17:00) (50 - 74)  BP: 93/41 (13 Mar 2019 17:00) (87/41 - 114/62)  BP(mean): 53 (13 Mar 2019 17:00) (53 - 84)  RR: 20 (13 Mar 2019 17:00) (13 - 24)  SpO2: 100% (13 Mar 2019 16:00) (96% - 100%)    PHYSICAL EXAM:  Constitutional: elderly male, alert and cooperative, NAD, lying in bed comfortably   Eyes: EOMI, vision is grossly intact  Back: no CVA tenderness bilaterally, spine normal without deformity or tenderness  Respiratory: no labored breathing  Cardiovascular: no peripheral edema, cyanosis, or pallor. Extremities are warm and well perfused.   Gastrointestinal: soft, non-tender, non-distended, no guarding, no rebound tenderness. Bladder non-palpable  Genitourinary: zhou in place - draining yellow urine. Dressings intact. b/l testicles exposed but appear viable. No purulence noted. No crepitus. +minimal duskiness of outer edges of skin at incised edges  Neurological:  A&O x3  Psychiatric: normal mood and affect        I/O    03-12-19 @ 07:01  -  03-13-19 @ 07:00  --------------------------------------------------------  IN: 0 mL / OUT: 625 mL / NET: -625 mL        Labs:    03-12    143  |  111<H>  |  64<H>  ----------------------------<  141<H>  3.7   |  22  |  1.76<H>    Ca    8.2<L>      12 Mar 2019 06:35

## 2019-03-13 NOTE — PROGRESS NOTE ADULT - SUBJECTIVE AND OBJECTIVE BOX
CHIEF COMPLAINT/Diagnosis: lethargy/ dehydration/ necrotizing fascitis/ gangrene of scrotum    SUBJECTIVE: no complaints    REVIEW OF SYSTEMS:    CONSTITUTIONAL: No weakness, fevers or chills  EYES/ENT: No visual changes;  No vertigo or throat pain   NECK: No pain or stiffness  RESPIRATORY: No cough, wheezing, hemoptysis; No shortness of breath  CARDIOVASCULAR: No chest pain or palpitations  GASTROINTESTINAL: No abdominal or epigastric pain. No nausea, vomiting, or hematemesis; No diarrhea or constipation. No melena or hematochezia.  GENITOURINARY: No dysuria, frequency or hematuria  NEUROLOGICAL: No numbness or weakness  SKIN: No itching, burning, rashes, or lesions   All other review of systems is negative unless indicated above    Vital Signs Last 24 Hrs  T(C): 36.7 (13 Mar 2019 14:28), Max: 36.7 (12 Mar 2019 21:45)  T(F): 98.1 (13 Mar 2019 14:28), Max: 98.1 (13 Mar 2019 14:28)  HR: 54 (13 Mar 2019 15:00) (50 - 74)  BP: 100/46 (13 Mar 2019 14:00) (87/46 - 114/62)  BP(mean): 61 (13 Mar 2019 14:00) (56 - 84)  RR: 20 (13 Mar 2019 15:00) (13 - 24)  SpO2: 100% (13 Mar 2019 12:00) (96% - 100%)    I&O's Summary    12 Mar 2019 07:01  -  13 Mar 2019 07:00  --------------------------------------------------------  IN: 555 mL / OUT: 625 mL / NET: -70 mL        CAPILLARY BLOOD GLUCOSE          PHYSICAL EXAM:    Constitutional: NAD, awake and alert, well-developed  HEENT: PERR, EOMI, Normal Hearing, MMM  Neck: Soft and supple, No LAD, No JVD  Respiratory: Breath sounds are clear bilaterally, No wheezing, rales or rhonchi  Cardiovascular: S1 and S2, regular rate and rhythm, no Murmurs, gallops or rubs  Gastrointestinal: Bowel Sounds present, soft, nontender, nondistended, no guarding, no rebound  Extremities: No peripheral edema  Vascular: 2+ peripheral pulses  Neurological: A/O x 3, no focal deficits  Musculoskeletal: 5/5 strength b/l upper and lower extremities  Skin: No rashes    MEDICATIONS:  MEDICATIONS  (STANDING):  amLODIPine   Tablet 5 milliGRAM(s) Oral daily  Dakins Solution - 1/2 Strength 1 Application(s) Topical daily  dextrose 5% + sodium chloride 0.9%. 1000 milliLiter(s) (65 mL/Hr) IV Continuous <Continuous>  docusate sodium 100 milliGRAM(s) Oral three times a day  heparin  Injectable 5000 Unit(s) SubCutaneous every 12 hours  metoprolol succinate  milliGRAM(s) Oral daily  piperacillin/tazobactam IVPB. 3.375 Gram(s) IV Intermittent every 8 hours  vancomycin  IVPB 1250 milliGRAM(s) IV Intermittent every 24 hours      LABS: All Labs Reviewed:    03-12    143  |  111<H>  |  64<H>  ----------------------------<  141<H>  3.7   |  22  |  1.76<H>    Ca    8.2<L>      12 Mar 2019 06:35            Blood Culture: 03-10 @ 21:50  Organism Blood Culture PCR  Gram Stain Blood -- Gram Stain   Growth in anaerobic bottle: Gram Negative Rods  Specimen Source .Blood None  Culture-Blood --    03-10 @ 21:43  Organism Escherichia coli  Gram Stain Blood -- Gram Stain --  Specimen Source .Urine Catheterized  Culture-Blood --          Assessment and Plan:   	  88 y/o M PMHx significant for hypertension, hyperlipidemia, meningioma, recurrent mechanical falls, and cervical myelopathy/radiculopathy presents to the ED for further evaluation of an unwitnessed fall last night. In the ED the patient was found to have notable skin tears to his right upper arm. The patient notes that the fall occurred upon ambulating to the bathroom and was unable to get up on his own. Labs => WBC 12.09, Hgb/Hct 11.1/32.8, LA 1.6, Alb 2.6, BUN/Cr 64/1.80, , TnI (-) x 3, Bands 17%. In the ED the patient received NS x 1.5L.    1)Falls in the Elderly  -likley secondary to dehydration   -c/w iv fluids, feeling some improvment after fluids  -physical therapy to see patient    2)JAMIE on CKD  ~DDx includes pre-renal azotemia vs ATN  ~cont. trial IV hydration  ~f/u urine studies  ~strict I/Os  ~will hold Furosemide for now (takes 20mg po daily)  -hold ACE    3)Leukocytosis with Bandemia  -UA is clean, xray does not show any clear infiltrates or pna  -likley comming from scrotal necrosis    4)HTN  ~cont. Amlodipine 10mg po daily  ~cont. Lisinopril 40mg po daily  ~cont. Metoprolol XL 100mg po daily    5)Malnutrition  ~f/u w/ Nutrition consultation in the am    6)Vte ppx  -hep sc    7-Scrotal gangrene gas and likley underlying fascitis?  -s/p surgery for scrotal gangrene and underlying fascitis POD #1  -lots of pus and necrotic tissue was removed  -patient tolerated well. he has no complaints today , says the dialudid pump for pain is working       note: disregard prevoius documentation on patient with dementia; he does not have dementia.

## 2019-03-13 NOTE — PROGRESS NOTE ADULT - ASSESSMENT
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 Bacteremia    Plan:    -continue daily wet to dry dressing changes  -continue IV abx as per ID  -continue zhou catheter to gravity drainage  -may require 2nd stage debridement - will continue to evaluate wound and see how it evolves in the next 24-48 hours  -will eventually need plastic surgery consult for reconstructive surgery - Dr. Chamorro to contact plastic surgeon in near future       Thank you for allowing me to assist in the care of this patient  Please feel free to call with any questions/concerns    Fito Ventura MD  [facility]  W: [phone]  C:  [phone]

## 2019-03-14 LAB
ALBUMIN SERPL ELPH-MCNC: 1.7 G/DL — LOW (ref 3.3–5)
ALP SERPL-CCNC: 102 U/L — SIGNIFICANT CHANGE UP (ref 40–120)
ALT FLD-CCNC: 25 U/L — SIGNIFICANT CHANGE UP (ref 12–78)
ANION GAP SERPL CALC-SCNC: 6 MMOL/L — SIGNIFICANT CHANGE UP (ref 5–17)
AST SERPL-CCNC: 33 U/L — SIGNIFICANT CHANGE UP (ref 15–37)
BASOPHILS # BLD AUTO: 0.01 K/UL — SIGNIFICANT CHANGE UP (ref 0–0.2)
BASOPHILS NFR BLD AUTO: 0.1 % — SIGNIFICANT CHANGE UP (ref 0–2)
BILIRUB SERPL-MCNC: 0.6 MG/DL — SIGNIFICANT CHANGE UP (ref 0.2–1.2)
BUN SERPL-MCNC: 53 MG/DL — HIGH (ref 7–23)
CALCIUM SERPL-MCNC: 8.2 MG/DL — LOW (ref 8.5–10.1)
CHLORIDE SERPL-SCNC: 111 MMOL/L — HIGH (ref 96–108)
CO2 SERPL-SCNC: 24 MMOL/L — SIGNIFICANT CHANGE UP (ref 22–31)
CREAT SERPL-MCNC: 1.28 MG/DL — SIGNIFICANT CHANGE UP (ref 0.5–1.3)
EOSINOPHIL # BLD AUTO: 0.02 K/UL — SIGNIFICANT CHANGE UP (ref 0–0.5)
EOSINOPHIL NFR BLD AUTO: 0.2 % — SIGNIFICANT CHANGE UP (ref 0–6)
GLUCOSE SERPL-MCNC: 141 MG/DL — HIGH (ref 70–99)
HCT VFR BLD CALC: 30 % — LOW (ref 39–50)
HGB BLD-MCNC: 9.7 G/DL — LOW (ref 13–17)
IMM GRANULOCYTES NFR BLD AUTO: 0.9 % — SIGNIFICANT CHANGE UP (ref 0–1.5)
LACTATE SERPL-SCNC: 1.3 MMOL/L — SIGNIFICANT CHANGE UP (ref 0.7–2)
LYMPHOCYTES # BLD AUTO: 1.54 K/UL — SIGNIFICANT CHANGE UP (ref 1–3.3)
LYMPHOCYTES # BLD AUTO: 16.4 % — SIGNIFICANT CHANGE UP (ref 13–44)
MCHC RBC-ENTMCNC: 32.3 GM/DL — SIGNIFICANT CHANGE UP (ref 32–36)
MCHC RBC-ENTMCNC: 32.3 PG — SIGNIFICANT CHANGE UP (ref 27–34)
MCV RBC AUTO: 100 FL — SIGNIFICANT CHANGE UP (ref 80–100)
MONOCYTES # BLD AUTO: 0.43 K/UL — SIGNIFICANT CHANGE UP (ref 0–0.9)
MONOCYTES NFR BLD AUTO: 4.6 % — SIGNIFICANT CHANGE UP (ref 2–14)
NEUTROPHILS # BLD AUTO: 7.3 K/UL — SIGNIFICANT CHANGE UP (ref 1.8–7.4)
NEUTROPHILS NFR BLD AUTO: 77.8 % — HIGH (ref 43–77)
NRBC # BLD: 0 /100 WBCS — SIGNIFICANT CHANGE UP (ref 0–0)
PLATELET # BLD AUTO: 166 K/UL — SIGNIFICANT CHANGE UP (ref 150–400)
POTASSIUM SERPL-MCNC: 4.1 MMOL/L — SIGNIFICANT CHANGE UP (ref 3.5–5.3)
POTASSIUM SERPL-SCNC: 4.1 MMOL/L — SIGNIFICANT CHANGE UP (ref 3.5–5.3)
PROT SERPL-MCNC: 5.2 GM/DL — LOW (ref 6–8.3)
RBC # BLD: 3 M/UL — LOW (ref 4.2–5.8)
RBC # FLD: 15 % — HIGH (ref 10.3–14.5)
SODIUM SERPL-SCNC: 141 MMOL/L — SIGNIFICANT CHANGE UP (ref 135–145)
SURGICAL PATHOLOGY FINAL REPORT - CH: SIGNIFICANT CHANGE UP
VANCOMYCIN TROUGH SERPL-MCNC: 9.5 UG/ML — LOW (ref 10–20)
WBC # BLD: 9.38 K/UL — SIGNIFICANT CHANGE UP (ref 3.8–10.5)
WBC # FLD AUTO: 9.38 K/UL — SIGNIFICANT CHANGE UP (ref 3.8–10.5)

## 2019-03-14 PROCEDURE — 93010 ELECTROCARDIOGRAM REPORT: CPT

## 2019-03-14 RX ADMIN — HEPARIN SODIUM 5000 UNIT(S): 5000 INJECTION INTRAVENOUS; SUBCUTANEOUS at 17:54

## 2019-03-14 RX ADMIN — AMLODIPINE BESYLATE 5 MILLIGRAM(S): 2.5 TABLET ORAL at 06:11

## 2019-03-14 RX ADMIN — Medication 166.67 MILLIGRAM(S): at 06:00

## 2019-03-14 RX ADMIN — Medication 100 MILLIGRAM(S): at 06:11

## 2019-03-14 RX ADMIN — PIPERACILLIN AND TAZOBACTAM 25 GRAM(S): 4; .5 INJECTION, POWDER, LYOPHILIZED, FOR SOLUTION INTRAVENOUS at 07:58

## 2019-03-14 RX ADMIN — HEPARIN SODIUM 5000 UNIT(S): 5000 INJECTION INTRAVENOUS; SUBCUTANEOUS at 06:11

## 2019-03-14 RX ADMIN — Medication 100 MILLIGRAM(S): at 22:59

## 2019-03-14 RX ADMIN — PIPERACILLIN AND TAZOBACTAM 25 GRAM(S): 4; .5 INJECTION, POWDER, LYOPHILIZED, FOR SOLUTION INTRAVENOUS at 14:55

## 2019-03-14 RX ADMIN — Medication 100 MILLIGRAM(S): at 14:55

## 2019-03-14 RX ADMIN — PIPERACILLIN AND TAZOBACTAM 25 GRAM(S): 4; .5 INJECTION, POWDER, LYOPHILIZED, FOR SOLUTION INTRAVENOUS at 22:59

## 2019-03-14 NOTE — PROGRESS NOTE ADULT - SUBJECTIVE AND OBJECTIVE BOX
CHIEF COMPLAINT/Diagnosis: scrotal gangrene/ founiers disease/ necrotizing fascitits    SUBJECTIVE: patient c/o being bored; he is elderly man with no funds for TV.     REVIEW OF SYSTEMS:    CONSTITUTIONAL: No weakness, fevers or chills  EYES/ENT: No visual changes;  No vertigo or throat pain   NECK: No pain or stiffness  RESPIRATORY: No cough, wheezing, hemoptysis; No shortness of breath  CARDIOVASCULAR: No chest pain or palpitations  GASTROINTESTINAL: No abdominal or epigastric pain. No nausea, vomiting, or hematemesis; No diarrhea or constipation. No melena or hematochezia.  GENITOURINARY: No dysuria, frequency or hematuria  NEUROLOGICAL: No numbness or weakness  SKIN: No itching, burning, rashes, or lesions   All other review of systems is negative unless indicated above    Vital Signs Last 24 Hrs  T(C): 36.6 (14 Mar 2019 14:27), Max: 36.9 (14 Mar 2019 05:00)  T(F): 97.8 (14 Mar 2019 14:27), Max: 98.4 (14 Mar 2019 05:00)  HR: 57 (14 Mar 2019 16:00) (48 - 68)  BP: 107/56 (14 Mar 2019 16:00) (92/49 - 133/66)  BP(mean): 68 (14 Mar 2019 16:00) (58 - 82)  RR: 22 (14 Mar 2019 16:00) (17 - 26)  SpO2: 97% (14 Mar 2019 16:00) (95% - 99%)    I&O's Summary    13 Mar 2019 07:01  -  14 Mar 2019 07:00  --------------------------------------------------------  IN: 1675 mL / OUT: 1500 mL / NET: 175 mL    14 Mar 2019 07:01  -  14 Mar 2019 17:04  --------------------------------------------------------  IN: 585 mL / OUT: 0 mL / NET: 585 mL        CAPILLARY BLOOD GLUCOSE          PHYSICAL EXAM:    Constitutional: NAD, awake and alert, well-developed  HEENT: PERR, EOMI, Normal Hearing, MMM  Neck: Soft and supple, No LAD, No JVD  Respiratory: Breath sounds are clear bilaterally, No wheezing, rales or rhonchi  Cardiovascular: S1 and S2, regular rate and rhythm, no Murmurs, gallops or rubs  Gastrointestinal: Bowel Sounds present, soft, nontender, nondistended, no guarding, no rebound  Extremities: No peripheral edema  Vascular: 2+ peripheral pulses  Neurological: A/O x 3, no focal deficits  Musculoskeletal: 5/5 strength b/l upper and lower extremities  Skin: No rashes    MEDICATIONS:  MEDICATIONS  (STANDING):  amLODIPine   Tablet 5 milliGRAM(s) Oral daily  dextrose 5% + sodium chloride 0.9%. 1000 milliLiter(s) (65 mL/Hr) IV Continuous <Continuous>  docusate sodium 100 milliGRAM(s) Oral three times a day  heparin  Injectable 5000 Unit(s) SubCutaneous every 12 hours  metoprolol succinate  milliGRAM(s) Oral daily  piperacillin/tazobactam IVPB. 3.375 Gram(s) IV Intermittent every 8 hours  vancomycin  IVPB 1250 milliGRAM(s) IV Intermittent every 24 hours      LABS: All Labs Reviewed:                        9.7    9.38  )-----------( 166      ( 14 Mar 2019 05:57 )             30.0     03-14    141  |  111<H>  |  53<H>  ----------------------------<  141<H>  4.1   |  24  |  1.28    Ca    8.2<L>      14 Mar 2019 05:57    TPro  5.2<L>  /  Alb  1.7<L>  /  TBili  0.6  /  DBili  x   /  AST  33  /  ALT  25  /  AlkPhos  102  03-14          Blood Culture: 03-12 @ 20:06  Organism --  Gram Stain Blood -- Gram Stain --  Specimen Source .Surgical Swab SCROTAL WOUND # 1  Culture-Blood --    03-10 @ 21:50  Organism Blood Culture PCR  Gram Stain Blood -- Gram Stain   Growth in anaerobic bottle: Gram Negative Rods  Specimen Source .Blood None  Culture-Blood --    03-10 @ 21:43  Organism Escherichia coli  Gram Stain Blood -- Gram Stain --  Specimen Source .Urine Catheterized  Culture-Blood --        Assessment and Plan:   	  88 y/o M PMHx significant for hypertension, hyperlipidemia, meningioma, recurrent mechanical falls, and cervical myelopathy/radiculopathy presents to the ED for further evaluation of an unwitnessed fall last night. In the ED the patient was found to have notable skin tears to his right upper arm. The patient notes that the fall occurred upon ambulating to the bathroom and was unable to get up on his own. Labs => WBC 12.09, Hgb/Hct 11.1/32.8, LA 1.6, Alb 2.6, BUN/Cr 64/1.80, , TnI (-) x 3, Bands 17%. In the ED the patient received NS x 1.5L.    1)Falls in the Elderly  -likley secondary to dehydration   -c/w iv fluids, feeling some improvment after fluids  -physical therapy to see patient    2)JAMIE on CKD  ~DDx includes pre-renal azotemia vs ATN  ~cont. trial IV hydration  ~f/u urine studies  ~strict I/Os  ~will hold Furosemide for now (takes 20mg po daily)  -hold ACE    3)Leukocytosis with Bandemia  -UA is clean, xray does not show any clear infiltrates or pna  -likley comming from scrotal necrosis    4)HTN  ~cont. Amlodipine 10mg po daily  ~cont. Lisinopril 40mg po daily  ~cont. Metoprolol XL 100mg po daily    5)Malnutrition  ~f/u w/ Nutrition consultation in the am    6)Vte ppx  -hep sc    7-Scrotal gangrene gas and likley underlying fascitis?  -s/p surgery for scrotal gangrene and underlying fascitis POD #2  -lots of pus and necrotic tissue was removed  -patient tolerated well. he has no complaints today , says the dialudid pump for pain is working     8-spirits are down. patient says he is very bored. he does not have resources for TV.   -would recco enzo T V      note: disregard prevoius documentation on patient with dementia; he does not have dementia.

## 2019-03-14 NOTE — PROGRESS NOTE ADULT - ASSESSMENT
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 Bacteremia    Plan:    -continue daily wet to dry dressing changes  -continue IV abx as per ID  -continue zhou catheter to gravity drainage  -f/u plastic surgery consult for reconstruction      Thank you for allowing me to assist in the care of this patient  Please feel free to call with any questions/concerns    Fito Ventura MD  [facility]  W: [phone]  C:  [phone]

## 2019-03-14 NOTE — PROGRESS NOTE ADULT - SUBJECTIVE AND OBJECTIVE BOX
Date of service: 03-14-19 @ 11:41    Lying in bed in NAD  Weak looking  Has scrotal tenderness    ROS: no fever or chills; no AH, no SOB or cough; no legs pain, no new rashes    MEDICATIONS  (STANDING):  amLODIPine   Tablet 5 milliGRAM(s) Oral daily  dextrose 5% + sodium chloride 0.9%. 1000 milliLiter(s) (65 mL/Hr) IV Continuous <Continuous>  docusate sodium 100 milliGRAM(s) Oral three times a day  heparin  Injectable 5000 Unit(s) SubCutaneous every 12 hours  metoprolol succinate  milliGRAM(s) Oral daily  piperacillin/tazobactam IVPB. 3.375 Gram(s) IV Intermittent every 8 hours  vancomycin  IVPB 1250 milliGRAM(s) IV Intermittent every 24 hours      Vital Signs Last 24 Hrs  T(C): 36.8 (14 Mar 2019 09:46), Max: 36.9 (14 Mar 2019 05:00)  T(F): 98.3 (14 Mar 2019 09:46), Max: 98.4 (14 Mar 2019 05:00)  HR: 68 (14 Mar 2019 09:00) (48 - 68)  BP: 133/66 (14 Mar 2019 09:00) (87/41 - 133/66)  BP(mean): 82 (14 Mar 2019 09:00) (53 - 82)  RR: 20 (14 Mar 2019 09:00) (16 - 24)  SpO2: 96% (14 Mar 2019 09:00) (95% - 100%)    Physical Exam:      Constitutional: frail looking  HEENT: NC/AT, EOMI, PERRLA, conjunctivae clear  Neck: supple; thyroid not palpable  Back: no tenderness  Respiratory: respiratory effort normal; decreased BS at bases  Cardiovascular: S1S2 regular, no murmurs  Abdomen: soft, not tender, not distended, positive BS  Genitourinary: no suprapubic tenderness; scrotal edema and erythema and open wound s/p surgery  Lymphatic: no LN palpable  Musculoskeletal: no muscle tenderness, no joint swelling or tenderness  Extremities: no pedal edema  Neurological/ Psychiatric: AxOx3, moving all extremities  Skin: no rashes; no palpable lesions    Labs: reviewed                        9.7    9.38  )-----------( 166      ( 14 Mar 2019 05:57 )             30.0     03-14    141  |  111<H>  |  53<H>  ----------------------------<  141<H>  4.1   |  24  |  1.28    Ca    8.2<L>      14 Mar 2019 05:57    TPro  5.2<L>  /  Alb  1.7<L>  /  TBili  0.6  /  DBili  x   /  AST  33  /  ALT  25  /  AlkPhos  102  03-14    Vancomycin Level, Trough: 9.5 ug/mL (03-14 @ 05:57)      03-12    143  |  111<H>  |  64<H>  ----------------------------<  141<H>  3.7   |  22  |  1.76<H>    Ca    8.2<L>      12 Mar 2019 06:35      Culture - Blood (collected 10 Mar 2019 21:50)  Source: .Blood None  Preliminary Report (12 Mar 2019 01:02):    No growth to date.    Culture - Blood (collected 10 Mar 2019 21:50)  Source: .Blood None  Gram Stain (12 Mar 2019 17:26):    Growth in anaerobic bottle: Gram Negative Rods  Preliminary Report (12 Mar 2019 17:26):    Growth in anaerobic bottle: Gram Negative Rods      Culture - Urine (collected 10 Mar 2019 21:43)  Source: .Urine Catheterized  Final Report (12 Mar 2019 19:14):    >100,000 CFU/ml Escherichia coli  Organism: Escherichia coli (12 Mar 2019 19:14)  Organism: Escherichia coli (12 Mar 2019 19:14)      -  Amikacin: S <=16      -  Ampicillin: S <=8 These ampicillin results predict results for amoxicillin      -  Ampicillin/Sulbactam: S <=8/4      -  Aztreonam: S <=4      -  Cefazolin: S <=8 For uncomplicated UTI with K. pneumoniae, E. coli, or P. mirablis: JANESSA <=16 is sensitive and JANESSA >=32 is resistant. This also predicts results for oral agents cefaclor, cefdinir, cefpodoxime, cefprozil, cefuroxime axetil, cephalexin and locarbef for uncomplicated UTI. Note that some isolates may be susceptible to these agents while testing resistant to cefazolin.      -  Cefepime: S <=4      -  Cefoxitin: S <=8      -  Ceftriaxone: S <=1 Enterobacter, Citrobacter, and Serratia may develop resistance during prolonged therapy      -  Ciprofloxacin: S <=1      -  Ertapenem: S <=1      -  Gentamicin: S <=4      -  Imipenem: S <=1      -  Levofloxacin: S <=2      -  Meropenem: S <=1      -  Nitrofurantoin: S <=32 Should not be used to treat pyelonephritis      -  Piperacillin/Tazobactam: S <=16      -  Tigecycline: S <=2      -  Tobramycin: S <=4      -  Trimethoprim/Sulfamethoxazole: S <=2/38      Method Type: JANESSA    Culture - Surgical Swab (03.12.19 @ 20:06)    Specimen Source: .Surgical Swab SCROTAL WOUND # 2    Culture Results:   Few Gram Negative Rods  See previous culture [id number]      Radiology: all available radiological tests reviewed    < from: CT Pelvis No Cont (03.12.19 @ 13:36) >  Extensive soft tissue gas in the left scrotum suspicious for Teetee's   gangrene.  Left groin hernia containing nonobstructed sigmoid colon.  Partially visualized soft tissue mass surrounding the aorta suspicious   for metastatic lymphadenopathy.    < end of copied text >    < from: US Testicles (03.12.19 @ 09:46) >  Diffuse scrotal wall emphysema suspicious for gas-forming infection.  Small complex left hydrocele.  Unremarkable testes.    < end of copied text >      Advanced directives addressed: full resuscitation

## 2019-03-14 NOTE — PROGRESS NOTE ADULT - SUBJECTIVE AND OBJECTIVE BOX
Patient seen and examined at bedside  Feels well  No fevers, chills, nausea, vomiting  Minimal pain  Dressing changed this AM by surgery team    Medications  acetaminophen   Tablet .. 650 milliGRAM(s) Oral every 6 hours PRN  acetaminophen   Tablet .. 650 milliGRAM(s) Oral every 6 hours PRN  amLODIPine   Tablet 5 milliGRAM(s) Oral daily  dextrose 5% + sodium chloride 0.9%. 1000 milliLiter(s) IV Continuous <Continuous>  docusate sodium 100 milliGRAM(s) Oral three times a day  heparin  Injectable 5000 Unit(s) SubCutaneous every 12 hours  metoprolol succinate  milliGRAM(s) Oral daily  morphine  - Injectable 1 milliGRAM(s) IV Push every 6 hours PRN  piperacillin/tazobactam IVPB. 3.375 Gram(s) IV Intermittent every 8 hours  senna 2 Tablet(s) Oral at bedtime PRN  traMADol 25 milliGRAM(s) Oral every 4 hours PRN  traMADol 50 milliGRAM(s) Oral every 4 hours PRN  vancomycin  IVPB 1250 milliGRAM(s) IV Intermittent every 24 hours      ROS  General: No weight change, fevers, chills  Ophthalmologic: No eye pain,  swelling,  ENMT: No hearing changes,  ear pain  Respiratory and Thorax: No cough, sputum, dyspnea, wheezing  Cardiovascular: No chest pain, SOB  Gastrointestinal:	No diarrhea, constipation, nausea, vomiting,  Genitourinary: see above  Neurological: No syncope, loss of consciousness, headache, dizziness  Psychiatric: No SI/HI/AH/VH.    Vital Signs Last 24 Hrs  T(C): 36.6 (14 Mar 2019 14:27), Max: 36.9 (14 Mar 2019 05:00)  T(F): 97.8 (14 Mar 2019 14:27), Max: 98.4 (14 Mar 2019 05:00)  HR: 57 (14 Mar 2019 16:00) (48 - 68)  BP: 107/56 (14 Mar 2019 16:00) (92/49 - 133/66)  BP(mean): 68 (14 Mar 2019 16:00) (58 - 82)  RR: 22 (14 Mar 2019 16:00) (17 - 26)  SpO2: 97% (14 Mar 2019 16:00) (95% - 99%)    PHYSICAL EXAM:  Constitutional: elderly male, alert and cooperative, NAD, lying in bed comfortably   Eyes: EOMI, vision is grossly intact  Back: no CVA tenderness bilaterally, spine normal without deformity or tenderness  Respiratory: no labored breathing  Cardiovascular: no peripheral edema, cyanosis, or pallor. Extremities are warm and well perfused.   Gastrointestinal: soft, non-tender, non-distended, no guarding, no rebound tenderness. Bladder non-palpable  Genitourinary: zhou in place - draining yellow urine. Dressings intact. b/l testicles exposed but appear viable. No purulence noted. No crepitus. +minimal duskiness of outer edges of skin at incised left edge of wound - stable in nature, not progressing  Neurological:  A&O x3  Psychiatric: normal mood and affect      I/O    03-13-19 @ 07:01  -  03-14-19 @ 07:00  --------------------------------------------------------  IN: 0 mL / OUT: 1500 mL / NET: -1500 mL  Labs:  CBC Full  -  ( 14 Mar 2019 05:57 )  WBC Count : 9.38 K/uL  Hemoglobin : 9.7 g/dL  Hematocrit : 30.0 %  Platelet Count - Automated : 166 K/uL  Mean Cell Volume : 100.0 fl  Mean Cell Hemoglobin : 32.3 pg  Mean Cell Hemoglobin Concentration : 32.3 gm/dL  Auto Neutrophil # : 7.30 K/uL  Auto Lymphocyte # : 1.54 K/uL  Auto Monocyte # : 0.43 K/uL  Auto Eosinophil # : 0.02 K/uL  Auto Basophil # : 0.01 K/uL  Auto Neutrophil % : 77.8 %  Auto Lymphocyte % : 16.4 %  Auto Monocyte % : 4.6 %  Auto Eosinophil % : 0.2 %  Auto Basophil % : 0.1 %    03-14    141  |  111<H>  |  53<H>  ----------------------------<  141<H>  4.1   |  24  |  1.28    Ca    8.2<L>      14 Mar 2019 05:57    TPro  5.2<L>  /  Alb  1.7<L>  /  TBili  0.6  /  DBili  x   /  AST  33  /  ALT  25  /  AlkPhos  102  03-14

## 2019-03-14 NOTE — PROGRESS NOTE ADULT - ASSESSMENT
88 y/o Male with h/o hypertension, hyperlipidemia, meningioma, recurrent mechanical falls, cervical myelopathy/radiculopathy was admitted on 3/12 for increased weakness and unwitnessed fall. In the ED the patient was found to have skin tears to his right upper arm. The patient notes that the fall occurred upon ambulating to the bathroom and was unable to get up on his own. In the ED the patient received zosyn and vancomycin IV.    1. Sepsis with GNR. UTI with Ecoli. 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 CRF stage 3.   -f/u BC x 2  -f/u wound cultures collected in OR  -on zosyn 3.375 gm IV q8h and vancomycin 1250 mg IV qd # 2  -tolerating abx well so far; no side effects noted  -obtain vancomycin trough level   -urology evaluation appreciated  -local wound care  -continue abx coverage  -monitor temps  -f/u CBC  -supportive care  2. Other issues:   -care per medicine

## 2019-03-15 LAB
-  AMIKACIN: SIGNIFICANT CHANGE UP
-  AMPICILLIN/SULBACTAM: SIGNIFICANT CHANGE UP
-  AMPICILLIN: SIGNIFICANT CHANGE UP
-  AZTREONAM: SIGNIFICANT CHANGE UP
-  CEFAZOLIN: SIGNIFICANT CHANGE UP
-  CEFEPIME: SIGNIFICANT CHANGE UP
-  CEFOXITIN: SIGNIFICANT CHANGE UP
-  CEFTRIAXONE: SIGNIFICANT CHANGE UP
-  CIPROFLOXACIN: SIGNIFICANT CHANGE UP
-  ERTAPENEM: SIGNIFICANT CHANGE UP
-  GENTAMICIN: SIGNIFICANT CHANGE UP
-  LEVOFLOXACIN: SIGNIFICANT CHANGE UP
-  MEROPENEM: SIGNIFICANT CHANGE UP
-  PIPERACILLIN/TAZOBACTAM: SIGNIFICANT CHANGE UP
-  TOBRAMYCIN: SIGNIFICANT CHANGE UP
-  TRIMETHOPRIM/SULFAMETHOXAZOLE: SIGNIFICANT CHANGE UP
METHOD TYPE: SIGNIFICANT CHANGE UP

## 2019-03-15 RX ADMIN — PIPERACILLIN AND TAZOBACTAM 25 GRAM(S): 4; .5 INJECTION, POWDER, LYOPHILIZED, FOR SOLUTION INTRAVENOUS at 06:03

## 2019-03-15 RX ADMIN — Medication 166.67 MILLIGRAM(S): at 06:03

## 2019-03-15 RX ADMIN — Medication 650 MILLIGRAM(S): at 14:00

## 2019-03-15 RX ADMIN — Medication 100 MILLIGRAM(S): at 06:04

## 2019-03-15 RX ADMIN — Medication 100 MILLIGRAM(S): at 15:11

## 2019-03-15 RX ADMIN — PIPERACILLIN AND TAZOBACTAM 25 GRAM(S): 4; .5 INJECTION, POWDER, LYOPHILIZED, FOR SOLUTION INTRAVENOUS at 21:23

## 2019-03-15 RX ADMIN — AMLODIPINE BESYLATE 5 MILLIGRAM(S): 2.5 TABLET ORAL at 06:04

## 2019-03-15 RX ADMIN — HEPARIN SODIUM 5000 UNIT(S): 5000 INJECTION INTRAVENOUS; SUBCUTANEOUS at 06:03

## 2019-03-15 RX ADMIN — HEPARIN SODIUM 5000 UNIT(S): 5000 INJECTION INTRAVENOUS; SUBCUTANEOUS at 17:19

## 2019-03-15 RX ADMIN — PIPERACILLIN AND TAZOBACTAM 25 GRAM(S): 4; .5 INJECTION, POWDER, LYOPHILIZED, FOR SOLUTION INTRAVENOUS at 15:11

## 2019-03-15 RX ADMIN — Medication 650 MILLIGRAM(S): at 12:48

## 2019-03-15 RX ADMIN — Medication 100 MILLIGRAM(S): at 21:23

## 2019-03-15 NOTE — CONSULT NOTE ADULT - SUBJECTIVE AND OBJECTIVE BOX
HPI:  88 y/o M PMHx significant for hypertension, hyperlipidemia, meningioma, recurrent mechanical falls, and cervical myelopathy/radiculopathy was diagnosed with Teetee gangrene, s/p debridement of penile shaft, scrotum and perineum by the Urology team. Current wound care with wet to moist dressings.  Plastic surgery consulted for reconstructive option.    PAST MEDICAL & SURGICAL HISTORY:  Cervical spondylosis with myelopathy and radiculopathy  Meningioma  Hypercholesterolemia  Hypertension  History of cataract surgery: Bilateral      Allergies    No Known Allergies    Intolerances    MEDICATIONS  (STANDING):  amLODIPine   Tablet 5 milliGRAM(s) Oral daily  dextrose 5% + sodium chloride 0.9%. 1000 milliLiter(s) (65 mL/Hr) IV Continuous <Continuous>  docusate sodium 100 milliGRAM(s) Oral three times a day  heparin  Injectable 5000 Unit(s) SubCutaneous every 12 hours  metoprolol succinate  milliGRAM(s) Oral daily  piperacillin/tazobactam IVPB. 3.375 Gram(s) IV Intermittent every 8 hours      ICU Vital Signs Last 24 Hrs  T(C): 36.6 (15 Mar 2019 10:19), Max: 36.6 (14 Mar 2019 14:27)  T(F): 97.9 (15 Mar 2019 10:19), Max: 97.9 (14 Mar 2019 17:35)  HR: 60 (15 Mar 2019 13:00) (47 - 66)  BP: 110/59 (15 Mar 2019 12:00) (99/62 - 141/55)  BP(mean): 70 (15 Mar 2019 12:00) (63 - 77)  ABP: --  ABP(mean): --  RR: 21 (15 Mar 2019 13:00) (9 - 26)  SpO2: 97% (15 Mar 2019 12:00) (95% - 100%)          PHYSICAL EXAM:    Perineum: scrotal, penile shaft, and perineal full thickness wounds clean, no purulence or fluctuance, measuring approximate 25 x 15 cm.                          9.7    9.38  )-----------( 166      ( 14 Mar 2019 05:57 )             30.0       03-14    141  |  111<H>  |  53<H>  ----------------------------<  141<H>  4.1   |  24  |  1.28    Ca    8.2<L>      14 Mar 2019 05:57    TPro  5.2<L>  /  Alb  1.7<L>  /  TBili  0.6  /  DBili  x   /  AST  33  /  ALT  25  /  AlkPhos  102  03-14    Albumin, Serum: 1.7 g/dL (03.14.19 @ 05:57)        Culture - Surgical Swab (03.12.19 @ 20:06)    Specimen Source: .Surgical Swab SCROTAL WOUND # 2    Culture Results:   Few Proteus mirabilis  Few Gram Negative Rods #2  See previous culture [id number]  Moderate Bacteroides fragilis "Susceptibilities not performed"    Culture - Surgical Swab (03.12.19 @ 20:06)    -  Cefoxitin: S <=8    -  Ceftriaxone: S <=1 Enterobacter, Citrobacter, and Serratia may develop resistance during prolonged therapy    -  Ciprofloxacin: S <=1    -  Ertapenem: S <=1    -  Gentamicin: S <=4    -  Levofloxacin: I 4    -  Meropenem: S <=1    -  Piperacillin/Tazobactam: S <=16    -  Amikacin: S <=16    -  Ampicillin: S <=8 These ampicillin results predict results for amoxicillin    -  Ampicillin/Sulbactam: S <=8/4    -  Aztreonam: R >16    -  Cefazolin: S <=8    -  Cefepime: S <=4    -  Tobramycin: S <=4    -  Trimethoprim/Sulfamethoxazole: S <=2/38    Specimen Source: .Surgical Swab SCROTAL WOUND # 1    Culture Results:   Few Proteus mirabilis  Few Gram Negative Rods #2 Identification and susceptibility to follow.  Moderate Bacteroides thetaiotaomicron "Susceptibilities not performed"    Organism Identification: Proteus mirabilis    Organism: Proteus mirabilis    Method Type: JANESSA            A/P    89 yo M s/p I&D Teetee gangrene now with scrotal, perineal and penile shaft wounds.    Cont local wound care as per urology - agree with wet to moist.  Will proceed with reconstruction once optimized from medical, nutritional, and social standpoints.

## 2019-03-15 NOTE — PROGRESS NOTE ADULT - SUBJECTIVE AND OBJECTIVE BOX
UROLOGY FOLLOW UP NOTE    SUBJECTIVE    Patient seen and examined at bedside.  No acute events overnight.    Current complaints are: pain only during dressing changes        OBJECTIVE    T(C): 36.6 (03-15-19 @ 05:00), Max: 36.8 (03-14-19 @ 09:46)  HR: 50 (03-15-19 @ 08:00)  BP: 112/51 (03-15-19 @ 08:00)  RR: 21 (03-15-19 @ 08:00)  SpO2: 98% (03-15-19 @ 08:00)    03-14-19 @ 07:01  -  03-15-19 @ 07:00  --------------------------------------------------------  IN: 2061 mL / OUT: 1700 mL / NET: 361 mL      Gen: elderly Male in NAD, well nourished  HEENT: normocephalic, hearing intact, moist mucus membranes  Chest: non labored breathing  ABd: soft, NT, ND, no masses  Back: no CVA tenderness  : no suprapubic distension or tenderness, * zhou draining yellow urine, no drainage or significant necrotic tissue seen after dressing remove, no surrounding skin erythema or crepitus, wound looks healthy overall, wet to dry saline dressing re-applied  Psych: normal affect and mood  Ext: moves all four extremities freely, R hand and BLE edema    03-14    141  |  111<H>  |  53<H>  ----------------------------<  141<H>  4.1   |  24  |  1.28    Ca    8.2<L>      14 Mar 2019 05:57    TPro  5.2<L>  /  Alb  1.7<L>  /  TBili  0.6  /  DBili  x   /  AST  33  /  ALT  25  /  AlkPhos  102  03-14      CBC Full  -  ( 14 Mar 2019 05:57 )  WBC Count : 9.38 K/uL  Hemoglobin : 9.7 g/dL  Hematocrit : 30.0 %  Platelet Count - Automated : 166 K/uL  Mean Cell Volume : 100.0 fl  Mean Cell Hemoglobin : 32.3 pg  Mean Cell Hemoglobin Concentration : 32.3 gm/dL  Auto Neutrophil # : 7.30 K/uL  Auto Lymphocyte # : 1.54 K/uL  Auto Monocyte # : 0.43 K/uL  Auto Eosinophil # : 0.02 K/uL  Auto Basophil # : 0.01 K/uL  Auto Neutrophil % : 77.8 %  Auto Lymphocyte % : 16.4 %  Auto Monocyte % : 4.6 %  Auto Eosinophil % : 0.2 %  Auto Basophil % : 0.1 %      acetaminophen   Tablet .. 650 milliGRAM(s) Oral every 6 hours PRN  acetaminophen   Tablet .. 650 milliGRAM(s) Oral every 6 hours PRN  amLODIPine   Tablet 5 milliGRAM(s) Oral daily  dextrose 5% + sodium chloride 0.9%. 1000 milliLiter(s) IV Continuous <Continuous>  docusate sodium 100 milliGRAM(s) Oral three times a day  heparin  Injectable 5000 Unit(s) SubCutaneous every 12 hours  metoprolol succinate  milliGRAM(s) Oral daily  morphine  - Injectable 1 milliGRAM(s) IV Push every 6 hours PRN  piperacillin/tazobactam IVPB. 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

## 2019-03-15 NOTE — PROGRESS NOTE ADULT - ASSESSMENT
88 y/o Male with h/o hypertension, hyperlipidemia, meningioma, recurrent mechanical falls, cervical myelopathy/radiculopathy was admitted on 3/12 for increased weakness and unwitnessed fall. In the ED the patient was found to have skin tears to his right upper arm. The patient notes that the fall occurred upon ambulating to the bathroom and was unable to get up on his own. In the ED the patient received zosyn and vancomycin IV.    1. Sepsis with bacteroides fragilis. UTI with Ecoli. 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 ARF on CRF stage 3.   -f/u BC x 2  -f/u wound cultures collected in OR  -on zosyn 3.375 gm IV q8h and vancomycin 1250 mg IV qd # 3  -tolerating abx well so far; no side effects noted  -vancomycin trough level is slightly low  -no evidence of MRSA; d/c vancomycin  -renal function is improving  -urology evaluation appreciated  -local wound care  -continue abx coverage with zosyn  -monitor temps  -f/u CBC  -supportive care  2. Other issues:   -care per medicine

## 2019-03-15 NOTE — PROGRESS NOTE ADULT - SUBJECTIVE AND OBJECTIVE BOX
Date of service: 03-15-19 @ 09:46    Lying in bed in NAD  Weak looking  Scrotal tenderness is improving  No new complaints    ROS: no fever or chills; denies dizziness, no HA, no SOB or cough, no abdominal pain, no diarrhea or constipation; no legs pain, no new rashes    MEDICATIONS  (STANDING):  amLODIPine   Tablet 5 milliGRAM(s) Oral daily  dextrose 5% + sodium chloride 0.9%. 1000 milliLiter(s) (65 mL/Hr) IV Continuous <Continuous>  docusate sodium 100 milliGRAM(s) Oral three times a day  heparin  Injectable 5000 Unit(s) SubCutaneous every 12 hours  metoprolol succinate  milliGRAM(s) Oral daily  piperacillin/tazobactam IVPB. 3.375 Gram(s) IV Intermittent every 8 hours      Vital Signs Last 24 Hrs  T(C): 36.6 (15 Mar 2019 05:00), Max: 36.6 (14 Mar 2019 14:27)  T(F): 97.9 (15 Mar 2019 05:00), Max: 97.9 (14 Mar 2019 17:35)  HR: 58 (15 Mar 2019 09:00) (47 - 66)  BP: 112/51 (15 Mar 2019 08:00) (99/62 - 141/55)  BP(mean): 67 (15 Mar 2019 08:00) (63 - 77)  RR: 21 (15 Mar 2019 09:00) (9 - 26)  SpO2: 95% (15 Mar 2019 09:00) (95% - 98%)    Physical Exam:      Constitutional: frail looking  HEENT: NC/AT, EOMI, PERRLA, conjunctivae clear  Neck: supple; thyroid not palpable  Back: no tenderness  Respiratory: respiratory effort normal; decreased BS at bases  Cardiovascular: S1S2 regular, no murmurs  Abdomen: soft, not tender, not distended, positive BS  Genitourinary: no suprapubic tenderness; scrotal edema and erythema and open wound s/p surgery  Lymphatic: no LN palpable  Musculoskeletal: no muscle tenderness, no joint swelling or tenderness  Extremities: no pedal edema  Neurological/ Psychiatric: AxOx3, moving all extremities  Skin: no rashes; no palpable lesions    Labs: reviewed                        9.7    9.38  )-----------( 166      ( 14 Mar 2019 05:57 )             30.0     03-14    141  |  111<H>  |  53<H>  ----------------------------<  141<H>  4.1   |  24  |  1.28    Ca    8.2<L>      14 Mar 2019 05:57    TPro  5.2<L>  /  Alb  1.7<L>  /  TBili  0.6  /  DBili  x   /  AST  33  /  ALT  25  /  AlkPhos  102  03-14      Vancomycin Level, Trough: 9.5 ug/mL (03-14 @ 05:57)                        9.7    9.38  )-----------( 166      ( 14 Mar 2019 05:57 )             30.0     03-14    141  |  111<H>  |  53<H>  ----------------------------<  141<H>  4.1   |  24  |  1.28    Ca    8.2<L>      14 Mar 2019 05:57    TPro  5.2<L>  /  Alb  1.7<L>  /  TBili  0.6  /  DBili  x   /  AST  33  /  ALT  25  /  AlkPhos  102  03-14    Vancomycin Level, Trough: 9.5 ug/mL (03-14 @ 05:57)      03-12    143  |  111<H>  |  64<H>  ----------------------------<  141<H>  3.7   |  22  |  1.76<H>    Ca    8.2<L>      12 Mar 2019 06:35      Culture - Surgical Swab (collected 12 Mar 2019 20:06)  Source: .Surgical Swab SCROTAL WOUND # 2  Preliminary Report (14 Mar 2019 08:26):    Few Gram Negative Rods    See previous culture [id number]    Culture - Surgical Swab (collected 12 Mar 2019 20:06)  Source: .Surgical Swab SCROTAL WOUND # 1  Preliminary Report (14 Mar 2019 08:05):    Few Gram Negative Rods    Culture - Blood (collected 10 Mar 2019 21:50)  Source: .Blood None  Preliminary Report (12 Mar 2019 01:02):    No growth to date.    Culture - Blood (collected 10 Mar 2019 21:50)  Source: .Blood None  Gram Stain (12 Mar 2019 17:26):    Growth in anaerobic bottle: Gram Negative Rods  Final Report (13 Mar 2019 19:21):    Growth in anaerobic bottle: Bacteroides fragilis    "Susceptibilities not performed"      Culture - Urine (collected 10 Mar 2019 21:43)  Source: .Urine Catheterized  Final Report (12 Mar 2019 19:14):    >100,000 CFU/ml Escherichia coli  Organism: Escherichia coli (12 Mar 2019 19:14)  Organism: Escherichia coli (12 Mar 2019 19:14)      -  Amikacin: S <=16      -  Ampicillin: S <=8 These ampicillin results predict results for amoxicillin      -  Ampicillin/Sulbactam: S <=8/4      -  Aztreonam: S <=4      -  Cefazolin: S <=8 For uncomplicated UTI with K. pneumoniae, E. coli, or P. mirablis: JANESSA <=16 is sensitive and JANESSA >=32 is resistant. This also predicts results for oral agents cefaclor, cefdinir, cefpodoxime, cefprozil, cefuroxime axetil, cephalexin and locarbef for uncomplicated UTI. Note that some isolates may be susceptible to these agents while testing resistant to cefazolin.      -  Cefepime: S <=4      -  Cefoxitin: S <=8      -  Ceftriaxone: S <=1 Enterobacter, Citrobacter, and Serratia may develop resistance during prolonged therapy      -  Ciprofloxacin: S <=1      -  Ertapenem: S <=1      -  Gentamicin: S <=4      -  Imipenem: S <=1      -  Levofloxacin: S <=2      -  Meropenem: S <=1      -  Nitrofurantoin: S <=32 Should not be used to treat pyelonephritis      -  Piperacillin/Tazobactam: S <=16      -  Tigecycline: S <=2      -  Tobramycin: S <=4      -  Trimethoprim/Sulfamethoxazole: S <=2/38      Method Type: JANESSA        Radiology: all available radiological tests reviewed    < from: CT Pelvis No Cont (03.12.19 @ 13:36) >  Extensive soft tissue gas in the left scrotum suspicious for Teetee's   gangrene.  Left groin hernia containing nonobstructed sigmoid colon.  Partially visualized soft tissue mass surrounding the aorta suspicious   for metastatic lymphadenopathy.    < end of copied text >    < from: US Testicles (03.12.19 @ 09:46) >  Diffuse scrotal wall emphysema suspicious for gas-forming infection.  Small complex left hydrocele.  Unremarkable testes.    < end of copied text >      Advanced directives addressed: full resuscitation

## 2019-03-15 NOTE — PROGRESS NOTE ADULT - SUBJECTIVE AND OBJECTIVE BOX
CHIEF COMPLAINT/Diagnosis:  necrotizing fascitis/ scrotal gangrene    SUBJECTIVE: no complaints    REVIEW OF SYSTEMS:    CONSTITUTIONAL: No weakness, fevers or chills  EYES/ENT: No visual changes;  No vertigo or throat pain   NECK: No pain or stiffness  RESPIRATORY: No cough, wheezing, hemoptysis; No shortness of breath  CARDIOVASCULAR: No chest pain or palpitations  GASTROINTESTINAL: No abdominal or epigastric pain. No nausea, vomiting, or hematemesis; No diarrhea or constipation. No melena or hematochezia.  GENITOURINARY: No dysuria, frequency or hematuria  NEUROLOGICAL: No numbness or weakness  SKIN: No itching, burning, rashes, or lesions   All other review of systems is negative unless indicated above    Vital Signs Last 24 Hrs  T(C): 36.7 (15 Mar 2019 17:12), Max: 36.7 (15 Mar 2019 17:12)  T(F): 98 (15 Mar 2019 17:12), Max: 98 (15 Mar 2019 17:12)  HR: 64 (15 Mar 2019 18:01) (47 - 64)  BP: 124/61 (15 Mar 2019 17:00) (99/62 - 141/55)  BP(mean): 76 (15 Mar 2019 17:00) (63 - 77)  RR: 27 (15 Mar 2019 18:01) (9 - 27)  SpO2: 98% (15 Mar 2019 17:00) (95% - 100%)    I&O's Summary    14 Mar 2019 07:01  -  15 Mar 2019 07:00  --------------------------------------------------------  IN: 2126 mL / OUT: 1700 mL / NET: 426 mL    15 Mar 2019 07:01  -  15 Mar 2019 19:49  --------------------------------------------------------  IN: 620 mL / OUT: 875 mL / NET: -255 mL        CAPILLARY BLOOD GLUCOSE          PHYSICAL EXAM:    Constitutional: NAD, awake and alert, well-developed  HEENT: PERR, EOMI, Normal Hearing, MMM  Neck: Soft and supple, No LAD, No JVD  Respiratory: Breath sounds are clear bilaterally, No wheezing, rales or rhonchi  Cardiovascular: S1 and S2, regular rate and rhythm, no Murmurs, gallops or rubs  Gastrointestinal: Bowel Sounds present, soft, nontender, nondistended, no guarding, no rebound  Extremities: No peripheral edema  Vascular: 2+ peripheral pulses  Neurological: A/O x 3, no focal deficits  Musculoskeletal: 5/5 strength b/l upper and lower extremities  Skin: No rashes    MEDICATIONS:  MEDICATIONS  (STANDING):  amLODIPine   Tablet 5 milliGRAM(s) Oral daily  docusate sodium 100 milliGRAM(s) Oral three times a day  heparin  Injectable 5000 Unit(s) SubCutaneous every 12 hours  piperacillin/tazobactam IVPB. 3.375 Gram(s) IV Intermittent every 8 hours      LABS: All Labs Reviewed:                        9.7    9.38  )-----------( 166      ( 14 Mar 2019 05:57 )             30.0     03-14    141  |  111<H>  |  53<H>  ----------------------------<  141<H>  4.1   |  24  |  1.28    Ca    8.2<L>      14 Mar 2019 05:57    TPro  5.2<L>  /  Alb  1.7<L>  /  TBili  0.6  /  DBili  x   /  AST  33  /  ALT  25  /  AlkPhos  102  03-14          Blood Culture: 03-12 @ 20:06  Organism Proteus mirabilis  Gram Stain Blood -- Gram Stain --  Specimen Source .Surgical Swab SCROTAL WOUND # 1  Culture-Blood --    03-10 @ 21:50  Organism Blood Culture PCR  Gram Stain Blood -- Gram Stain   Growth in anaerobic bottle: Gram Negative Rods  Specimen Source .Blood None  Culture-Blood --    03-10 @ 21:43  Organism Escherichia coli  Gram Stain Blood -- Gram Stain --  Specimen Source .Urine Catheterized  Culture-Blood --          Assessment and Plan:   	  89 y/o M PMHx significant for hypertension, hyperlipidemia, meningioma, recurrent mechanical falls, and cervical myelopathy/radiculopathy presents to the ED for further evaluation of an unwitnessed fall last night. In the ED the patient was found to have notable skin tears to his right upper arm. The patient notes that the fall occurred upon ambulating to the bathroom and was unable to get up on his own. Labs => WBC 12.09, Hgb/Hct 11.1/32.8, LA 1.6, Alb 2.6, BUN/Cr 64/1.80, , TnI (-) x 3, Bands 17%. In the ED the patient received NS x 1.5L.    1)Falls in the Elderly  -likley secondary to dehydration   -c/w iv fluids, feeling some improvment after fluids  -physical therapy to see patient    2)JAMIE on CKD  ~DDx includes pre-renal azotemia vs ATN  ~cont. trial IV hydration  ~f/u urine studies  ~strict I/Os  ~will hold Furosemide for now (takes 20mg po daily)  -hold ACE    3)Leukocytosis with Bandemia  -UA is clean, xray does not show any clear infiltrates or pna  -likley comming from scrotal necrosis    4)HTN  ~cont. Amlodipine 10mg po daily  ~cont. Lisinopril 40mg po daily  ~cont. Metoprolol XL 100mg po daily    5)Malnutrition  ~f/u w/ Nutrition consultation in the am    6)Vte ppx  -hep sc    7-Scrotal gangrene gas and likley underlying fascitis?  -s/p surgery for scrotal gangrene and underlying fascitis POD #2  -lots of pus and necrotic tissue was removed  -patient tolerated well. he has no complaints today , says the dialudid pump for pain is working     8-spirits are down. patient says he is very bored. he does not have resources for TV.   -would recco enzo MATTHEW  - nurse manager paide for patients TV , patient much happier today, in better spirits      note: disregard prevoius documentation on patient with dementia; he does not have dementia.

## 2019-03-16 LAB
-  AMIKACIN: SIGNIFICANT CHANGE UP
-  AMPICILLIN/SULBACTAM: SIGNIFICANT CHANGE UP
-  AMPICILLIN: SIGNIFICANT CHANGE UP
-  AZTREONAM: SIGNIFICANT CHANGE UP
-  CEFAZOLIN: SIGNIFICANT CHANGE UP
-  CEFEPIME: SIGNIFICANT CHANGE UP
-  CEFOXITIN: SIGNIFICANT CHANGE UP
-  CEFTRIAXONE: SIGNIFICANT CHANGE UP
-  CIPROFLOXACIN: SIGNIFICANT CHANGE UP
-  ERTAPENEM: SIGNIFICANT CHANGE UP
-  GENTAMICIN: SIGNIFICANT CHANGE UP
-  IMIPENEM: SIGNIFICANT CHANGE UP
-  LEVOFLOXACIN: SIGNIFICANT CHANGE UP
-  MEROPENEM: SIGNIFICANT CHANGE UP
-  PIPERACILLIN/TAZOBACTAM: SIGNIFICANT CHANGE UP
-  TOBRAMYCIN: SIGNIFICANT CHANGE UP
-  TRIMETHOPRIM/SULFAMETHOXAZOLE: SIGNIFICANT CHANGE UP
CULTURE RESULTS: SIGNIFICANT CHANGE UP
METHOD TYPE: SIGNIFICANT CHANGE UP
ORGANISM # SPEC MICROSCOPIC CNT: SIGNIFICANT CHANGE UP
SPECIMEN SOURCE: SIGNIFICANT CHANGE UP

## 2019-03-16 RX ADMIN — PIPERACILLIN AND TAZOBACTAM 25 GRAM(S): 4; .5 INJECTION, POWDER, LYOPHILIZED, FOR SOLUTION INTRAVENOUS at 15:02

## 2019-03-16 RX ADMIN — Medication 100 MILLIGRAM(S): at 05:39

## 2019-03-16 RX ADMIN — PIPERACILLIN AND TAZOBACTAM 25 GRAM(S): 4; .5 INJECTION, POWDER, LYOPHILIZED, FOR SOLUTION INTRAVENOUS at 05:39

## 2019-03-16 RX ADMIN — AMLODIPINE BESYLATE 5 MILLIGRAM(S): 2.5 TABLET ORAL at 05:40

## 2019-03-16 RX ADMIN — Medication 100 MILLIGRAM(S): at 21:03

## 2019-03-16 RX ADMIN — PIPERACILLIN AND TAZOBACTAM 25 GRAM(S): 4; .5 INJECTION, POWDER, LYOPHILIZED, FOR SOLUTION INTRAVENOUS at 21:03

## 2019-03-16 RX ADMIN — Medication 100 MILLIGRAM(S): at 15:02

## 2019-03-16 RX ADMIN — HEPARIN SODIUM 5000 UNIT(S): 5000 INJECTION INTRAVENOUS; SUBCUTANEOUS at 05:39

## 2019-03-16 RX ADMIN — HEPARIN SODIUM 5000 UNIT(S): 5000 INJECTION INTRAVENOUS; SUBCUTANEOUS at 17:40

## 2019-03-16 NOTE — PROGRESS NOTE ADULT - SUBJECTIVE AND OBJECTIVE BOX
Patient seen and examined at bedside  No acute events overnight  Feels well  Family at bedside  Minimal pain  Notes he may be going for hernia repair by general surgery on Monday    Medications  acetaminophen   Tablet .. 650 milliGRAM(s) Oral every 6 hours PRN  acetaminophen   Tablet .. 650 milliGRAM(s) Oral every 6 hours PRN  amLODIPine   Tablet 5 milliGRAM(s) Oral daily  docusate sodium 100 milliGRAM(s) Oral three times a day  heparin  Injectable 5000 Unit(s) SubCutaneous every 12 hours  morphine  - Injectable 1 milliGRAM(s) IV Push every 6 hours PRN  piperacillin/tazobactam IVPB. 3.375 Gram(s) IV Intermittent every 8 hours  senna 2 Tablet(s) Oral at bedtime PRN  traMADol 25 milliGRAM(s) Oral every 4 hours PRN  traMADol 50 milliGRAM(s) Oral every 4 hours PRN      ROS  General: No weight change, fevers, chills  Ophthalmologic: No eye pain,  swelling,  ENMT: No hearing changes,  ear pain  Respiratory and Thorax: No cough, sputum, dyspnea, wheezing  Cardiovascular: No chest pain, SOB  Gastrointestinal:	No diarrhea, constipation, nausea, vomiting,  Genitourinary: see above  Neurological: No syncope, loss of consciousness, headache, dizziness  Psychiatric: No SI/HI/AH/VH.    Vital Signs Last 24 Hrs  T(C): 36.6 (16 Mar 2019 10:00), Max: 36.8 (15 Mar 2019 21:15)  T(F): 97.9 (16 Mar 2019 10:00), Max: 98.2 (15 Mar 2019 21:15)  HR: 54 (16 Mar 2019 10:00) (54 - 64)  BP: 107/57 (16 Mar 2019 10:00) (107/52 - 138/65)  BP(mean): 81 (15 Mar 2019 21:00) (76 - 81)  RR: 18 (16 Mar 2019 10:00) (18 - 27)  SpO2: 96% (16 Mar 2019 10:00) (96% - 99%)    PHYSICAL EXAM:  Constitutional: elderly male, alert and cooperative, NAD, lying in bed comfortably   Eyes: EOMI, vision is grossly intact  Back: no CVA tenderness bilaterally, spine normal without deformity or tenderness  Respiratory: no labored breathing  Cardiovascular: no peripheral edema, cyanosis, or pallor. Extremities are warm and well perfused.   Gastrointestinal: soft, non-tender, non-distended, no guarding, no rebound tenderness. Bladder non-palpable  Genitourinary: zhou in place - draining yellow urine. b/l testicles exposed but appear viable. No purulence noted. No crepitus. +minimal duskiness of outer edges of skin at incised left edge of wound - stable in nature, not progressing, +exudate  Neurological:  A&O x3  Psychiatric: normal mood and affect    I/O    03-15-19 @ 07:01  -  03-16-19 @ 07:00  --------------------------------------------------------  IN: 0 mL / OUT: 875 mL / NET: -875 mL

## 2019-03-16 NOTE — PROGRESS NOTE ADULT - ASSESSMENT
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 Bacteremia    POD#4   Wet to dry dressing changed at bedside.    Plan:    -continue daily wet to dry dressing changes  -continue IV abx as per ID  -continue zhou catheter to gravity drainage  -plastic surgery consult appreciated - will follow up about timing of reconstructive surgery    Thank you for allowing me to assist in the care of this patient  Please feel free to call with any questions/concerns    Fito Ventura MD  [facility]  W: [phone]  C:  [phone]

## 2019-03-16 NOTE — PROGRESS NOTE ADULT - SUBJECTIVE AND OBJECTIVE BOX
CHIEF COMPLAINT/Diagnosis: scrotal necrotizing fascitis/ scrotal gangrene    SUBJECTIVE: no complaints     REVIEW OF SYSTEMS:    CONSTITUTIONAL: No weakness, fevers or chills  EYES/ENT: No visual changes;  No vertigo or throat pain   NECK: No pain or stiffness  RESPIRATORY: No cough, wheezing, hemoptysis; No shortness of breath  CARDIOVASCULAR: No chest pain or palpitations  GASTROINTESTINAL: No abdominal or epigastric pain. No nausea, vomiting, or hematemesis; No diarrhea or constipation. No melena or hematochezia.  GENITOURINARY: No dysuria, frequency or hematuria  NEUROLOGICAL: No numbness or weakness  SKIN: No itching, burning, rashes, or lesions   All other review of systems is negative unless indicated above    Vital Signs Last 24 Hrs  T(C): 36.6 (16 Mar 2019 04:44), Max: 36.8 (15 Mar 2019 21:15)  T(F): 97.8 (16 Mar 2019 04:44), Max: 98.2 (15 Mar 2019 21:15)  HR: 58 (16 Mar 2019 04:44) (54 - 64)  BP: 107/52 (16 Mar 2019 04:44) (107/52 - 138/65)  BP(mean): 81 (15 Mar 2019 21:00) (70 - 81)  RR: 18 (15 Mar 2019 21:02) (17 - 27)  SpO2: 98% (16 Mar 2019 04:44) (95% - 100%)    I&O's Summary    15 Mar 2019 07:01  -  16 Mar 2019 07:00  --------------------------------------------------------  IN: 620 mL / OUT: 875 mL / NET: -255 mL        CAPILLARY BLOOD GLUCOSE          PHYSICAL EXAM:    Constitutional: NAD, awake and alert, well-developed  HEENT: PERR, EOMI, Normal Hearing, MMM  Neck: Soft and supple, No LAD, No JVD  Respiratory: Breath sounds are clear bilaterally, No wheezing, rales or rhonchi  Cardiovascular: S1 and S2, regular rate and rhythm, no Murmurs, gallops or rubs  Gastrointestinal: Bowel Sounds present, soft, nontender, nondistended, no guarding, no rebound  Extremities: No peripheral edema  Vascular: 2+ peripheral pulses  Neurological: A/O x 3, no focal deficits  Musculoskeletal: 5/5 strength b/l upper and lower extremities  Skin: No rashes    MEDICATIONS:  MEDICATIONS  (STANDING):  amLODIPine   Tablet 5 milliGRAM(s) Oral daily  docusate sodium 100 milliGRAM(s) Oral three times a day  heparin  Injectable 5000 Unit(s) SubCutaneous every 12 hours  piperacillin/tazobactam IVPB. 3.375 Gram(s) IV Intermittent every 8 hours      LABS: All Labs Reviewed:                Blood Culture: 03-12 @ 20:06  Organism Proteus mirabilis  Gram Stain Blood -- Gram Stain --  Specimen Source .Surgical Swab SCROTAL WOUND # 1  Culture-Blood --        Assessment and Plan:   	  88 y/o M PMHx significant for hypertension, hyperlipidemia, meningioma, recurrent mechanical falls, and cervical myelopathy/radiculopathy presents to the ED for further evaluation of an unwitnessed fall last night. In the ED the patient was found to have notable skin tears to his right upper arm. The patient notes that the fall occurred upon ambulating to the bathroom and was unable to get up on his own. Labs => WBC 12.09, Hgb/Hct 11.1/32.8, LA 1.6, Alb 2.6, BUN/Cr 64/1.80, , TnI (-) x 3, Bands 17%. In the ED the patient received NS x 1.5L.    1)Falls in the Elderly  -likley secondary to dehydration   -c/w iv fluids, feeling some improvment after fluids  -physical therapy to see patient    2)JAMIE on CKD  ~DDx includes pre-renal azotemia vs ATN  ~cont. trial IV hydration  ~f/u urine studies  ~strict I/Os  ~will hold Furosemide for now (takes 20mg po daily)  -hold ACE    3)Leukocytosis with Bandemia  -UA is clean, xray does not show any clear infiltrates or pna  -likley comming from scrotal necrosis    4)HTN  ~cont. Amlodipine 10mg po daily  ~cont. Lisinopril 40mg po daily  ~cont. Metoprolol XL 100mg po daily    5)Malnutrition  ~f/u w/ Nutrition consultation in the am    6)Vte ppx  -hep sc    7-Scrotal gangrene gas and likley underlying fascitis?  -s/p surgery for scrotal gangrene and underlying fascitis POD #2  -lots of pus and necrotic tissue was removed  -patient tolerated well. he has no complaints today , says the dialudid pump for pain is working     8-spirits are down. patient says he is very bored. he does not have resources for TV.   -would recco enzo MATTHEW  - nurse manager paide for patients TV , patient much happier today, in better spirits      note: disregard prevoius documentation on patient with dementia; he does not have dementia.

## 2019-03-17 RX ORDER — OXYCODONE HYDROCHLORIDE 5 MG/1
5 TABLET ORAL EVERY 4 HOURS
Qty: 0 | Refills: 0 | Status: DISCONTINUED | OUTPATIENT
Start: 2019-03-17 | End: 2019-03-17

## 2019-03-17 RX ORDER — MORPHINE SULFATE 50 MG/1
2 CAPSULE, EXTENDED RELEASE ORAL EVERY 6 HOURS
Qty: 0 | Refills: 0 | Status: DISCONTINUED | OUTPATIENT
Start: 2019-03-17 | End: 2019-03-17

## 2019-03-17 RX ORDER — MORPHINE SULFATE 50 MG/1
15 CAPSULE, EXTENDED RELEASE ORAL ONCE
Qty: 0 | Refills: 0 | Status: DISCONTINUED | OUTPATIENT
Start: 2019-03-17 | End: 2019-03-17

## 2019-03-17 RX ORDER — MORPHINE SULFATE 50 MG/1
15 CAPSULE, EXTENDED RELEASE ORAL
Qty: 0 | Refills: 0 | Status: DISCONTINUED | OUTPATIENT
Start: 2019-03-18 | End: 2019-03-25

## 2019-03-17 RX ADMIN — HEPARIN SODIUM 5000 UNIT(S): 5000 INJECTION INTRAVENOUS; SUBCUTANEOUS at 18:46

## 2019-03-17 RX ADMIN — AMLODIPINE BESYLATE 5 MILLIGRAM(S): 2.5 TABLET ORAL at 05:11

## 2019-03-17 RX ADMIN — PIPERACILLIN AND TAZOBACTAM 25 GRAM(S): 4; .5 INJECTION, POWDER, LYOPHILIZED, FOR SOLUTION INTRAVENOUS at 05:11

## 2019-03-17 RX ADMIN — MORPHINE SULFATE 15 MILLIGRAM(S): 50 CAPSULE, EXTENDED RELEASE ORAL at 16:42

## 2019-03-17 RX ADMIN — Medication 100 MILLIGRAM(S): at 05:11

## 2019-03-17 RX ADMIN — OXYCODONE HYDROCHLORIDE 5 MILLIGRAM(S): 5 TABLET ORAL at 14:52

## 2019-03-17 RX ADMIN — PIPERACILLIN AND TAZOBACTAM 25 GRAM(S): 4; .5 INJECTION, POWDER, LYOPHILIZED, FOR SOLUTION INTRAVENOUS at 13:30

## 2019-03-17 RX ADMIN — PIPERACILLIN AND TAZOBACTAM 25 GRAM(S): 4; .5 INJECTION, POWDER, LYOPHILIZED, FOR SOLUTION INTRAVENOUS at 21:13

## 2019-03-17 RX ADMIN — HEPARIN SODIUM 5000 UNIT(S): 5000 INJECTION INTRAVENOUS; SUBCUTANEOUS at 05:11

## 2019-03-17 RX ADMIN — OXYCODONE HYDROCHLORIDE 5 MILLIGRAM(S): 5 TABLET ORAL at 17:39

## 2019-03-17 RX ADMIN — Medication 100 MILLIGRAM(S): at 21:13

## 2019-03-17 RX ADMIN — Medication 100 MILLIGRAM(S): at 13:22

## 2019-03-17 NOTE — PROGRESS NOTE ADULT - SUBJECTIVE AND OBJECTIVE BOX
CHIEF COMPLAINT/Diagnosis: scrotal gangrne/ foureniers/ scrotal fascitis    SUBJECTIVE: no complaints; denies any pain; but nursing stated pain when patient is being changed    REVIEW OF SYSTEMS:    CONSTITUTIONAL: No weakness, fevers or chills  EYES/ENT: No visual changes;  No vertigo or throat pain   NECK: No pain or stiffness  RESPIRATORY: No cough, wheezing, hemoptysis; No shortness of breath  CARDIOVASCULAR: No chest pain or palpitations  GASTROINTESTINAL: No abdominal or epigastric pain. No nausea, vomiting, or hematemesis; No diarrhea or constipation. No melena or hematochezia.  GENITOURINARY: No dysuria, frequency or hematuria  NEUROLOGICAL: No numbness or weakness  SKIN: No itching, burning, rashes, or lesions   All other review of systems is negative unless indicated above    Vital Signs Last 24 Hrs  T(C): 36.6 (17 Mar 2019 11:25), Max: 36.6 (16 Mar 2019 16:59)  T(F): 97.8 (17 Mar 2019 11:25), Max: 97.8 (16 Mar 2019 16:59)  HR: 55 (17 Mar 2019 11:25) (51 - 55)  BP: 116/65 (17 Mar 2019 11:25) (116/65 - 119/55)  BP(mean): --  RR: 18 (17 Mar 2019 11:25) (17 - 18)  SpO2: 97% (17 Mar 2019 11:25) (97% - 98%)    I&O's Summary    16 Mar 2019 07:01  -  17 Mar 2019 07:00  --------------------------------------------------------  IN: 0 mL / OUT: 1350 mL / NET: -1350 mL        CAPILLARY BLOOD GLUCOSE          PHYSICAL EXAM:    Constitutional: NAD, awake and alert, well-developed  HEENT: PERR, EOMI, Normal Hearing, MMM  Neck: Soft and supple, No LAD, No JVD  Respiratory: Breath sounds are clear bilaterally, No wheezing, rales or rhonchi  Cardiovascular: S1 and S2, regular rate and rhythm, no Murmurs, gallops or rubs  Gastrointestinal: Bowel Sounds present, soft, nontender, nondistended, no guarding, no rebound  Extremities: No peripheral edema  Vascular: 2+ peripheral pulses  Neurological: A/O x 3, no focal deficits  Musculoskeletal: 5/5 strength b/l upper and lower extremities  Skin: No rashes    MEDICATIONS:  MEDICATIONS  (STANDING):  amLODIPine   Tablet 5 milliGRAM(s) Oral daily  docusate sodium 100 milliGRAM(s) Oral three times a day  heparin  Injectable 5000 Unit(s) SubCutaneous every 12 hours  piperacillin/tazobactam IVPB. 3.375 Gram(s) IV Intermittent every 8 hours      LABS: All Labs Reviewed:                Blood Culture: 03-12 @ 20:06  Organism Proteus mirabilis  Gram Stain Blood -- Gram Stain --  Specimen Source .Surgical Swab SCROTAL WOUND # 1  Culture-Blood --        Assessment and Plan:   	  89 y/o M PMHx significant for hypertension, hyperlipidemia, meningioma, recurrent mechanical falls, and cervical myelopathy/radiculopathy presents to the ED for further evaluation of an unwitnessed fall last night. In the ED the patient was found to have notable skin tears to his right upper arm. The patient notes that the fall occurred upon ambulating to the bathroom and was unable to get up on his own. Labs => WBC 12.09, Hgb/Hct 11.1/32.8, LA 1.6, Alb 2.6, BUN/Cr 64/1.80, , TnI (-) x 3, Bands 17%. In the ED the patient received NS x 1.5L.    1)Falls in the Elderly  -likley secondary to dehydration   -c/w iv fluids, feeling some improvment after fluids  -physical therapy to see patient    2)JAMIE on CKD  ~DDx includes pre-renal azotemia vs ATN  ~cont. trial IV hydration  ~f/u urine studies  ~strict I/Os  ~will hold Furosemide for now (takes 20mg po daily)  -hold ACE    3)Leukocytosis with Bandemia  -UA is clean, xray does not show any clear infiltrates or pna  -likley comming from scrotal necrosis    4)HTN  ~cont. Amlodipine 10mg po daily  ~cont. Lisinopril 40mg po daily  ~cont. 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   -Pain control:  patient not moving because afraid of pain; nursing states severe pain when he is being changed; >> patient has had major surgery and needs more improved sustained pain control  >>>> Start ER morphine PO 15mg q12h. Start oxycodone 5mg PRN mod pain. Start IVP morphine q6h PRN severe pain.     8-spirits are down. patient says he is very bored. he does not have resources for TV.   -would recco enzo MATTHEW  - nurse manager paide for patients TV , patient much happier today, in better spirits      note: disregard previous documentation on patient with dementia; he does not have dementia. He is axox3.     Plan: Patient initally came with fall/ lethargy, found to have scrotal gangrene, necrotizing fascitis. s/p debridement of scrotal and part of penis. very nice man. elderly but axox3. He will need alot of continued surgery and repair. Plastic surgery needs to come back to see him. alot of pain when moving and changing. Added a good pain regemin.

## 2019-03-17 NOTE — PROGRESS NOTE ADULT - SUBJECTIVE AND OBJECTIVE BOX
Patient seen and examined at bedside  No acute events overnight  Feels well  Has pain during dressing changes  No f/c/n/v    Medications  acetaminophen   Tablet .. 650 milliGRAM(s) Oral every 6 hours PRN  acetaminophen   Tablet .. 650 milliGRAM(s) Oral every 6 hours PRN  amLODIPine   Tablet 5 milliGRAM(s) Oral daily  docusate sodium 100 milliGRAM(s) Oral three times a day  heparin  Injectable 5000 Unit(s) SubCutaneous every 12 hours  oxyCODONE    IR 5 milliGRAM(s) Oral every 4 hours PRN  piperacillin/tazobactam IVPB. 3.375 Gram(s) IV Intermittent every 8 hours  senna 2 Tablet(s) Oral at bedtime PRN      ROS  General: No weight change, fevers, chills  Ophthalmologic: No eye pain,  swelling,  ENMT: No hearing changes,  ear pain  Respiratory and Thorax: No cough, sputum, dyspnea, wheezing  Cardiovascular: No chest pain, SOB  Gastrointestinal:	No diarrhea, constipation, nausea, vomiting,  Genitourinary: see above  Neurological: No syncope, loss of consciousness, headache, dizziness  Psychiatric: No SI/HI/AH/VH.    Vital Signs Last 24 Hrs  T(C): 36.6 (17 Mar 2019 11:25), Max: 36.6 (16 Mar 2019 16:59)  T(F): 97.8 (17 Mar 2019 11:25), Max: 97.8 (16 Mar 2019 16:59)  HR: 55 (17 Mar 2019 11:25) (51 - 55)  BP: 116/65 (17 Mar 2019 11:25) (116/65 - 119/55)  BP(mean): --  RR: 18 (17 Mar 2019 11:25) (17 - 18)  SpO2: 97% (17 Mar 2019 11:25) (97% - 98%)    PHYSICAL EXAM:  Constitutional: elderly male, alert and cooperative, NAD, lying in bed comfortably   Eyes: EOMI, vision is grossly intact  Back: no CVA tenderness bilaterally, spine normal without deformity or tenderness  Respiratory: no labored breathing  Cardiovascular: no peripheral edema, cyanosis, or pallor. Extremities are warm and well perfused.   Gastrointestinal: soft, non-tender, non-distended, no guarding, no rebound tenderness. Bladder non-palpable  Genitourinary: zhou in place - draining yellow urine. b/l testicles exposed but appear viable. No purulence noted. No crepitus. +minimal duskiness of outer edges of skin at incised left edge of wound - stable in nature, not progressing, +exudate. +tenderness over perineal area on deep palpation.   Neurological:  A&O x3  Psychiatric: normal mood and affect      I/O    03-16-19 @ 07:01  -  03-17-19 @ 07:00  --------------------------------------------------------  IN: 0 mL / OUT: 1350 mL / NET: -1350 mL

## 2019-03-17 NOTE — PROGRESS NOTE ADULT - ASSESSMENT
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 Bacteremia    POD#5   Wet to dry dressing changed at bedside.    Plan:    -continue daily wet to dry dressing changes  -continue IV abx as per ID  -continue zhou catheter to gravity drainage  -f/u plastic surgery for reconstruction      Thank you for allowing me to assist in the care of this patient  Please feel free to call with any questions/concerns    Fito Ventura MD  [facility]  W: [phone]  C:  [phone]

## 2019-03-18 LAB
ALBUMIN SERPL ELPH-MCNC: 1.6 G/DL — LOW (ref 3.3–5)
ALP SERPL-CCNC: 109 U/L — SIGNIFICANT CHANGE UP (ref 40–120)
ALT FLD-CCNC: 24 U/L — SIGNIFICANT CHANGE UP (ref 12–78)
ANION GAP SERPL CALC-SCNC: 4 MMOL/L — LOW (ref 5–17)
AST SERPL-CCNC: 22 U/L — SIGNIFICANT CHANGE UP (ref 15–37)
BASOPHILS # BLD AUTO: 0.01 K/UL — SIGNIFICANT CHANGE UP (ref 0–0.2)
BASOPHILS NFR BLD AUTO: 0.1 % — SIGNIFICANT CHANGE UP (ref 0–2)
BILIRUB SERPL-MCNC: 0.4 MG/DL — SIGNIFICANT CHANGE UP (ref 0.2–1.2)
BUN SERPL-MCNC: 31 MG/DL — HIGH (ref 7–23)
CALCIUM SERPL-MCNC: 7.9 MG/DL — LOW (ref 8.5–10.1)
CHLORIDE SERPL-SCNC: 107 MMOL/L — SIGNIFICANT CHANGE UP (ref 96–108)
CO2 SERPL-SCNC: 26 MMOL/L — SIGNIFICANT CHANGE UP (ref 22–31)
CREAT SERPL-MCNC: 1.03 MG/DL — SIGNIFICANT CHANGE UP (ref 0.5–1.3)
EOSINOPHIL # BLD AUTO: 0.23 K/UL — SIGNIFICANT CHANGE UP (ref 0–0.5)
EOSINOPHIL NFR BLD AUTO: 2.6 % — SIGNIFICANT CHANGE UP (ref 0–6)
GLUCOSE SERPL-MCNC: 106 MG/DL — HIGH (ref 70–99)
HCT VFR BLD CALC: 28.1 % — LOW (ref 39–50)
HGB BLD-MCNC: 9 G/DL — LOW (ref 13–17)
IMM GRANULOCYTES NFR BLD AUTO: 0.7 % — SIGNIFICANT CHANGE UP (ref 0–1.5)
LYMPHOCYTES # BLD AUTO: 1.64 K/UL — SIGNIFICANT CHANGE UP (ref 1–3.3)
LYMPHOCYTES # BLD AUTO: 18.6 % — SIGNIFICANT CHANGE UP (ref 13–44)
MCHC RBC-ENTMCNC: 32 GM/DL — SIGNIFICANT CHANGE UP (ref 32–36)
MCHC RBC-ENTMCNC: 32.5 PG — SIGNIFICANT CHANGE UP (ref 27–34)
MCV RBC AUTO: 101.4 FL — HIGH (ref 80–100)
MONOCYTES # BLD AUTO: 0.6 K/UL — SIGNIFICANT CHANGE UP (ref 0–0.9)
MONOCYTES NFR BLD AUTO: 6.8 % — SIGNIFICANT CHANGE UP (ref 2–14)
NEUTROPHILS # BLD AUTO: 6.3 K/UL — SIGNIFICANT CHANGE UP (ref 1.8–7.4)
NEUTROPHILS NFR BLD AUTO: 71.2 % — SIGNIFICANT CHANGE UP (ref 43–77)
NRBC # BLD: 0 /100 WBCS — SIGNIFICANT CHANGE UP (ref 0–0)
PLATELET # BLD AUTO: 274 K/UL — SIGNIFICANT CHANGE UP (ref 150–400)
POTASSIUM SERPL-MCNC: 4.5 MMOL/L — SIGNIFICANT CHANGE UP (ref 3.5–5.3)
POTASSIUM SERPL-SCNC: 4.5 MMOL/L — SIGNIFICANT CHANGE UP (ref 3.5–5.3)
PROT SERPL-MCNC: 5.3 GM/DL — LOW (ref 6–8.3)
RBC # BLD: 2.77 M/UL — LOW (ref 4.2–5.8)
RBC # FLD: 14.5 % — SIGNIFICANT CHANGE UP (ref 10.3–14.5)
SODIUM SERPL-SCNC: 137 MMOL/L — SIGNIFICANT CHANGE UP (ref 135–145)
WBC # BLD: 8.84 K/UL — SIGNIFICANT CHANGE UP (ref 3.8–10.5)
WBC # FLD AUTO: 8.84 K/UL — SIGNIFICANT CHANGE UP (ref 3.8–10.5)

## 2019-03-18 PROCEDURE — 93971 EXTREMITY STUDY: CPT | Mod: 26,RT

## 2019-03-18 PROCEDURE — 93306 TTE W/DOPPLER COMPLETE: CPT | Mod: 26

## 2019-03-18 RX ORDER — FUROSEMIDE 40 MG
40 TABLET ORAL ONCE
Qty: 0 | Refills: 0 | Status: COMPLETED | OUTPATIENT
Start: 2019-03-18 | End: 2019-03-19

## 2019-03-18 RX ADMIN — HEPARIN SODIUM 5000 UNIT(S): 5000 INJECTION INTRAVENOUS; SUBCUTANEOUS at 18:46

## 2019-03-18 RX ADMIN — MORPHINE SULFATE 15 MILLIGRAM(S): 50 CAPSULE, EXTENDED RELEASE ORAL at 06:16

## 2019-03-18 RX ADMIN — Medication 100 MILLIGRAM(S): at 05:20

## 2019-03-18 RX ADMIN — Medication 100 MILLIGRAM(S): at 21:41

## 2019-03-18 RX ADMIN — Medication 100 MILLIGRAM(S): at 13:49

## 2019-03-18 RX ADMIN — AMLODIPINE BESYLATE 5 MILLIGRAM(S): 2.5 TABLET ORAL at 05:20

## 2019-03-18 RX ADMIN — PIPERACILLIN AND TAZOBACTAM 25 GRAM(S): 4; .5 INJECTION, POWDER, LYOPHILIZED, FOR SOLUTION INTRAVENOUS at 05:20

## 2019-03-18 RX ADMIN — HEPARIN SODIUM 5000 UNIT(S): 5000 INJECTION INTRAVENOUS; SUBCUTANEOUS at 05:21

## 2019-03-18 RX ADMIN — PIPERACILLIN AND TAZOBACTAM 25 GRAM(S): 4; .5 INJECTION, POWDER, LYOPHILIZED, FOR SOLUTION INTRAVENOUS at 13:49

## 2019-03-18 RX ADMIN — PIPERACILLIN AND TAZOBACTAM 25 GRAM(S): 4; .5 INJECTION, POWDER, LYOPHILIZED, FOR SOLUTION INTRAVENOUS at 21:42

## 2019-03-18 RX ADMIN — MORPHINE SULFATE 15 MILLIGRAM(S): 50 CAPSULE, EXTENDED RELEASE ORAL at 18:46

## 2019-03-18 NOTE — PROGRESS NOTE ADULT - ASSESSMENT
89 y/o Male with h/o hypertension, hyperlipidemia, meningioma, recurrent mechanical falls, cervical myelopathy/radiculopathy was admitted on 3/12 for increased weakness and unwitnessed fall. In the ED the patient was found to have skin tears to his right upper arm. The patient notes that the fall occurred upon ambulating to the bathroom and was unable to get up on his own. In the ED the patient received zosyn and vancomycin IV.    1. S/p sepsis with bacteroides fragilis. UTI with Ecoli. 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 ARF on CRF stage 3.   -f/u BC x 2  -wound cultures reviewed  -on zosyn 3.375 gm IV q8h # 6  -tolerating abx well so far; no side effects noted  -renal function is improved  -urology evaluation appreciated  -local wound care  -continue abx coverage   -monitor temps  -f/u CBC  -supportive care  2. Other issues:   -care per medicine

## 2019-03-18 NOTE — PROGRESS NOTE ADULT - ASSESSMENT
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 Bacteremia    POD#6   Wet to dry dressing changed at bedside.    Plan:    -continue daily wet to dry dressing changes  -continue IV abx as per ID  -continue zhou catheter to gravity drainage  -f/u plastic surgery for reconstruction      Thank you for allowing me to assist in the care of this patient  Please feel free to call with any questions/concerns    Fito Ventura MD  [facility]  W: [phone]  C:  [phone]

## 2019-03-18 NOTE — PROGRESS NOTE ADULT - SUBJECTIVE AND OBJECTIVE BOX
CHIEF COMPLAINT/Diagnosis: scrotal gangrne/ foureniers/ scrotal fascitis    SUBJECTIVE: is in nad. had bradycardia with 2.66 s pause o/n. no bb since 3/16    REVIEW OF SYSTEMS:    CONSTITUTIONAL: No weakness, fevers or chills  EYES/ENT: No visual changes;  No vertigo or throat pain   NECK: No pain or stiffness  RESPIRATORY: No cough, wheezing, hemoptysis; No shortness of breath  CARDIOVASCULAR: No chest pain or palpitations  GASTROINTESTINAL: No abdominal or epigastric pain. No nausea, vomiting, or hematemesis; No diarrhea or constipation. No melena or hematochezia.  GENITOURINARY: No dysuria, frequency or hematuria  NEUROLOGICAL: No numbness or weakness  SKIN: No itching, burning, rashes, or lesions   All other review of systems is negative unless indicated above    ICU Vital Signs Last 24 Hrs  T(C): 36.4 (18 Mar 2019 10:58), Max: 36.8 (17 Mar 2019 16:53)  T(F): 97.5 (18 Mar 2019 10:58), Max: 98.2 (17 Mar 2019 16:53)  HR: 69 (18 Mar 2019 10:58) (49 - 69)  BP: 104/64 (18 Mar 2019 10:58) (104/64 - 112/55)  BP(mean): --  ABP: --  ABP(mean): --  RR: 17 (18 Mar 2019 10:58) (17 - 18)  SpO2: 95% (18 Mar 2019 10:58) (95% - 96%)          PHYSICAL EXAM:    Constitutional: NAD, awake and alert, well-developed  HEENT: PERR, EOMI, Normal Hearing, MMM  Neck: Soft and supple, No LAD, No JVD  Respiratory: Breath sounds are clear bilaterally, No wheezing, rales or rhonchi  Cardiovascular: S1 and S2, regular rate and rhythm, no Murmurs, gallops or rubs  Gastrointestinal: Bowel Sounds present, soft, nontender, nondistended, no guarding, no rebound  : zhou in place; shaft, testicles and perineum debrided with pink viable tissue noted; scant amount of yellow granulation tissue noted  Extremities: +2 pitting edema to B/L UE's and trace B/L LEs  Vascular: 2+ peripheral pulses  Neurological: A/O x 3, no focal deficits  Musculoskeletal: 5/5 strength b/l upper and lower extremities  Skin: stage II decubitus ulcer    MEDICATIONS:  MEDICATIONS  (STANDING):  amLODIPine   Tablet 5 milliGRAM(s) Oral daily  docusate sodium 100 milliGRAM(s) Oral three times a day  heparin  Injectable 5000 Unit(s) SubCutaneous every 12 hours  piperacillin/tazobactam IVPB. 3.375 Gram(s) IV Intermittent every 8 hours      LABS: All Labs Reviewed:                Blood Culture: 03-12 @ 20:06  Organism Proteus mirabilis  Gram Stain Blood -- Gram Stain --  Specimen Source .Surgical Swab SCROTAL WOUND # 1  Culture-Blood --        Assessment and Plan:   	  89 y/o M PMHx significant for hypertension, hyperlipidemia, meningioma, recurrent mechanical falls, and cervical myelopathy/radiculopathy presents to the ED for further evaluation of an unwitnessed fall last night. In the ED the patient was found to have notable skin tears to his right upper arm. The patient notes that the fall occurred upon ambulating to the bathroom and was unable to get up on his own. Labs => WBC 12.09, Hgb/Hct 11.1/32.8, LA 1.6, Alb 2.6, BUN/Cr 64/1.80, , TnI (-) x 3, Bands 17%. In the ED the patient received NS x 1.5L.    A:  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, scrotum and penile shaft pod #5  Sepsis with bacteroides/proteus/E coli    UTI E coli  L groin non obstructive hernia  Hypoalbuminemia  Anemia - suspect post op blood loss  JAMIE on CKD II  Bradycardia  Recurrent falls  HTN/Hyperlipidemia  Cervical myelopathy with radic    P:  On Zosyn  Blood cultures growing GNR  Scrotal cultures: Proteus, E coli and bacteroides sensitive to Zosyn  Pain controlled with current regimen  Discussed with urology-> will need definitive tx with plastics ie flap  EP for arrythmia/bradycardia. All AV chary blockers held for > 48 hrs. Check TSH  JAMIE on CKD resolved. Give dose of IV lasix however suspect his edema is related to third spacing from hypoalbuminemia from poor nutrition  Will check doppler of rue as R arm is more swollen then L  Eventual staged elective herniorrhaphy   Long term goal discussed with patient. Has no real support system and lives with wife  Is mostly bedbound and moves only for transfers  Will needs LOBO upon d/c  dvt px    TT: 68 min

## 2019-03-18 NOTE — PROGRESS NOTE ADULT - SUBJECTIVE AND OBJECTIVE BOX
Patient seen and examined at bedside  Dressing changed by surgical PA today  Minimal pain  No f/c/n/v  Feels well    Medications  acetaminophen   Tablet .. 650 milliGRAM(s) Oral every 6 hours PRN  acetaminophen   Tablet .. 650 milliGRAM(s) Oral every 6 hours PRN  amLODIPine   Tablet 5 milliGRAM(s) Oral daily  docusate sodium 100 milliGRAM(s) Oral three times a day  heparin  Injectable 5000 Unit(s) SubCutaneous every 12 hours  morphine ER Tablet 15 milliGRAM(s) Oral two times a day  oxyCODONE    IR 5 milliGRAM(s) Oral every 4 hours PRN  piperacillin/tazobactam IVPB. 3.375 Gram(s) IV Intermittent every 8 hours  senna 2 Tablet(s) Oral at bedtime PRN      ROS  General: No weight change, fevers, chills  Ophthalmologic: No eye pain,  swelling,  ENMT: No hearing changes,  ear pain  Respiratory and Thorax: No cough, sputum, dyspnea, wheezing  Cardiovascular: No chest pain, SOB  Gastrointestinal:	No diarrhea, constipation, nausea, vomiting,  Genitourinary: see above  Neurological: No syncope, loss of consciousness, headache, dizziness  Psychiatric: No SI/HI/AH/VH.      Vital Signs Last 24 Hrs  T(C): 36.4 (18 Mar 2019 10:58), Max: 36.8 (17 Mar 2019 16:53)  T(F): 97.5 (18 Mar 2019 10:58), Max: 98.2 (17 Mar 2019 16:53)  HR: 69 (18 Mar 2019 10:58) (49 - 69)  BP: 104/64 (18 Mar 2019 10:58) (104/64 - 112/55)  BP(mean): --  RR: 17 (18 Mar 2019 10:58) (17 - 18)  SpO2: 95% (18 Mar 2019 10:58) (95% - 96%)    PHYSICAL EXAM:  Constitutional: elderly male, alert and cooperative, NAD, lying in bed comfortably   Eyes: EOMI  Back: no CVA tenderness bilaterally  Respiratory: no labored breathing  Cardiovascular: no cyanosis, or pallor. Extremities are warm and well perfused.   Gastrointestinal: soft, non-tender, non-distended, no guarding, no rebound tenderness. Bladder non-palpable  Genitourinary: zhou in place - draining yellow urine. b/l testicles exposed but appear viable. No purulence noted. No crepitus. +minimal duskiness of outer edges of skin at incised left edge of wound - stable in nature, not progressing, +exudate.   Neurological:  A&O x3        I/O    03-17-19 @ 07:01  -  03-18-19 @ 07:00  --------------------------------------------------------  IN: 0 mL / OUT: 2000 mL / NET: -2000 mL        Labs:  CBC Full  -  ( 18 Mar 2019 05:37 )  WBC Count : 8.84 K/uL  Hemoglobin : 9.0 g/dL  Hematocrit : 28.1 %  Platelet Count - Automated : 274 K/uL  Mean Cell Volume : 101.4 fl  Mean Cell Hemoglobin : 32.5 pg  Mean Cell Hemoglobin Concentration : 32.0 gm/dL  Auto Neutrophil # : 6.30 K/uL  Auto Lymphocyte # : 1.64 K/uL  Auto Monocyte # : 0.60 K/uL  Auto Eosinophil # : 0.23 K/uL  Auto Basophil # : 0.01 K/uL  Auto Neutrophil % : 71.2 %  Auto Lymphocyte % : 18.6 %  Auto Monocyte % : 6.8 %  Auto Eosinophil % : 2.6 %  Auto Basophil % : 0.1 %    03-18    137  |  107  |  31<H>  ----------------------------<  106<H>  4.5   |  26  |  1.03    Ca    7.9<L>      18 Mar 2019 05:37    TPro  5.3<L>  /  Alb  1.6<L>  /  TBili  0.4  /  DBili  x   /  AST  22  /  ALT  24  /  AlkPhos  109  03-18

## 2019-03-18 NOTE — CONSULT NOTE ADULT - SUBJECTIVE AND OBJECTIVE BOX
89 year old male that EP called to evaluate for bradycardia.  Pt lat dose of BB was 3/11/2019.  Telemetry shows SB during sleep with rates in the 30s, during the day he remains in AT Fib with VR 50 BPM.  He was admitted with an unwitnessed fall and has a fall history    During this hospital stay this patient underwent a Scrotal dbridement.         PAST MEDICAL & SURGICAL HISTORY:  Cervical spondylosis with myelopathy and radiculopathy  Meningioma  Hypercholesterolemia  Hypertension  History of cataract surgery: Bilateral      MEDICATIONS  (STANDING):  amLODIPine   Tablet 5 milliGRAM(s) Oral daily  docusate sodium 100 milliGRAM(s) Oral three times a day  heparin  Injectable 5000 Unit(s) SubCutaneous every 12 hours  morphine ER Tablet 15 milliGRAM(s) Oral two times a day  piperacillin/tazobactam IVPB. 3.375 Gram(s) IV Intermittent every 8 hours    MEDICATIONS  (PRN):  acetaminophen   Tablet .. 650 milliGRAM(s) Oral every 6 hours PRN Temp greater or equal to 38C (100.4F), Mild Pain (1 - 3)  acetaminophen   Tablet .. 650 milliGRAM(s) Oral every 6 hours PRN Mild Pain (1 - 3)  oxyCODONE    IR 5 milliGRAM(s) Oral every 4 hours PRN Moderate Pain (4 - 6)  senna 2 Tablet(s) Oral at bedtime PRN Constipation      Allergies    No Known Allergies    Intolerances        SOCIAL HISTORY: Denies tobacco, etoh abuse or illicit drug use    FAMILY HISTORY:  No pertinent family history in first degree relatives      Vital Signs Last 24 Hrs  T(C): 36.4 (18 Mar 2019 10:58), Max: 36.8 (17 Mar 2019 16:53)  T(F): 97.5 (18 Mar 2019 10:58), Max: 98.2 (17 Mar 2019 16:53)  HR: 69 (18 Mar 2019 10:58) (49 - 69)  BP: 104/64 (18 Mar 2019 10:58) (104/64 - 112/55)  BP(mean): --  RR: 17 (18 Mar 2019 10:58) (17 - 18)  SpO2: 95% (18 Mar 2019 10:58) (95% - 96%)    REVIEW OF SYSTEMS:    CONSTITUTIONAL:  As per HPI.  HEENT:  Eyes:  No diplopia or blurred vision. ENT:  No earache, sore throat or runny nose.  CARDIOVASCULAR:  No pressure, squeezing, strangling, tightness, heaviness or aching about the chest, neck, axilla or epigastrium.  RESPIRATORY:  No cough, shortness of breath, PND or orthopnea.  GASTROINTESTINAL:  No nausea, vomiting or diarrhea.  GENITOURINARY:  No dysuria, frequency or urgency.  MUSCULOSKELETAL:  As per HPI.  SKIN:  No change in skin, hair or nails.  NEUROLOGIC:  No paresthesias, fasciculations, seizures or weakness.  PSYCHIATRIC:  No disorder of thought or mood.  ENDOCRINE:  No heat or cold intolerance, polyuria or polydipsia.  HEMATOLOGICAL:  No easy bruising or bleedings:  .     PHYSICAL EXAMINATION:    GENERAL APPEARANCE:  Pt. is not currently dyspneic, in no distress. Pt. is alert, oriented, and pleasant.  HEENT:  Pupils are normal and react normally. No icterus. Mucous membranes well colored.  NECK:  Supple. No lymphadenopathy. Jugular venous pressure not elevated. Carotids equal.   HEART:   The cardiac impulse has a normal quality. There are no murmurs, rubs or gallops noted  CHEST:  Chest is clear to auscultation. Normal respiratory effort.  ABDOMEN:  Soft and nontender.   EXTREMITIES:  There is no edema.   SKIN:  No rash or significant lesions are noted.    I&O's Summary    17 Mar 2019 07:01  -  18 Mar 2019 07:00  --------------------------------------------------------  IN: 0 mL / OUT: 2000 mL / NET: -2000 mL        LABS:                        9.0    8.84  )-----------( 274      ( 18 Mar 2019 05:37 )             28.1     03-18    137  |  107  |  31<H>  ----------------------------<  106<H>  4.5   |  26  |  1.03    Ca    7.9<L>      18 Mar 2019 05:37    TPro  5.3<L>  /  Alb  1.6<L>  /  TBili  0.4  /  DBili  x   /  AST  22  /  ALT  24  /  AlkPhos  109  03-18    LIVER FUNCTIONS - ( 18 Mar 2019 05:37 )  Alb: 1.6 g/dL / Pro: 5.3 gm/dL / ALK PHOS: 109 U/L / ALT: 24 U/L / AST: 22 U/L / GGT: x                       EKG: AT Fib with VR 53 BPM with low voltage and normal axis,      TELEMETRY:  AT Fib with Ventricular rates 30s during sleep and 50 BPM during wake hours.    CARDIAC TESTS:    RADIOLOGY & ADDITIONAL STUDIES:

## 2019-03-18 NOTE — CONSULT NOTE ADULT - ASSESSMENT
ASSESSMENT & PLAN:  88 y/o with AT. Fib, is bradycardic, (with longest pauses 2.7 seconds, asymptomatic) history:  falls,  AT Fib, HTN and is S/P scrotal debridement.  Last dose of BB was 3/11/2019.    Continue to monitor patient  Maintain normal lytes and Mg level  Dr. Luna to further evaluate this patient  Continue to hydrate  Avoid Diuretics and AV Blocking agents     Thank-you for letting the EP Service  participate in the care of your patient.

## 2019-03-18 NOTE — PROGRESS NOTE ADULT - SUBJECTIVE AND OBJECTIVE BOX
Date of service: 03-18-19 @ 15:28    Lying in bed in NAD  Sleepy  Has perineal pain    ROS: no fever or chills; denies dizziness, no HA, no SOB or cough, no abdominal pain, no diarrhea or constipation; no legs pain, no rashes    MEDICATIONS  (STANDING):  amLODIPine   Tablet 5 milliGRAM(s) Oral daily  docusate sodium 100 milliGRAM(s) Oral three times a day  furosemide   Injectable 40 milliGRAM(s) IV Push once  heparin  Injectable 5000 Unit(s) SubCutaneous every 12 hours  morphine ER Tablet 15 milliGRAM(s) Oral two times a day  piperacillin/tazobactam IVPB. 3.375 Gram(s) IV Intermittent every 8 hours      Vital Signs Last 24 Hrs  T(C): 36.4 (18 Mar 2019 10:58), Max: 36.8 (17 Mar 2019 16:53)  T(F): 97.5 (18 Mar 2019 10:58), Max: 98.2 (17 Mar 2019 16:53)  HR: 69 (18 Mar 2019 10:58) (49 - 69)  BP: 104/64 (18 Mar 2019 10:58) (104/64 - 112/55)  BP(mean): --  RR: 17 (18 Mar 2019 10:58) (17 - 18)  SpO2: 95% (18 Mar 2019 10:58) (95% - 96%)    Physical Exam:      Constitutional: frail looking  HEENT: NC/AT, EOMI, PERRLA, conjunctivae clear  Neck: supple; thyroid not palpable  Back: no tenderness  Respiratory: respiratory effort normal; decreased BS at bases  Cardiovascular: S1S2 regular, no murmurs  Abdomen: soft, not tender, not distended, positive BS  Genitourinary: no suprapubic tenderness; scrotal edema and erythema and open wound s/p surgery  Lymphatic: no LN palpable  Musculoskeletal: no muscle tenderness, no joint swelling or tenderness  Extremities: no pedal edema  Neurological/ Psychiatric: AxOx3, moving all extremities  Skin: no rashes; no palpable lesions    Labs: reviewed                        9.0    8.84  )-----------( 274      ( 18 Mar 2019 05:37 )             28.1     03-18    137  |  107  |  31<H>  ----------------------------<  106<H>  4.5   |  26  |  1.03    Ca    7.9<L>      18 Mar 2019 05:37    TPro  5.3<L>  /  Alb  1.6<L>  /  TBili  0.4  /  DBili  x   /  AST  22  /  ALT  24  /  AlkPhos  109  03-18                                   9.7    9.38  )-----------( 166      ( 14 Mar 2019 05:57 )             30.0     03-14    141  |  111<H>  |  53<H>  ----------------------------<  141<H>  4.1   |  24  |  1.28    Ca    8.2<L>      14 Mar 2019 05:57    TPro  5.2<L>  /  Alb  1.7<L>  /  TBili  0.6  /  DBili  x   /  AST  33  /  ALT  25  /  AlkPhos  102  03-14    Vancomycin Level, Trough: 9.5 ug/mL (03-14 @ 05:57)      03-12    143  |  111<H>  |  64<H>  ----------------------------<  141<H>  3.7   |  22  |  1.76<H>    Culture - Surgical Swab (03.12.19 @ 20:06)    -  Levofloxacin: I 4    -  Levofloxacin: S <=2    -  Tobramycin: S <=4    -  Tobramycin: S <=4    -  Trimethoprim/Sulfamethoxazole: S <=2/38    -  Trimethoprim/Sulfamethoxazole: S <=2/38    -  Amikacin: S <=16    -  Amikacin: S <=16    -  Ampicillin: S <=8 These ampicillin results predict results for amoxicillin    -  Ampicillin: S <=8 These ampicillin results predict results for amoxicillin    -  Ampicillin/Sulbactam: S <=8/4    -  Ampicillin/Sulbactam: S <=8/4    -  Aztreonam: R >16    -  Aztreonam: S <=4    -  Cefazolin: S <=8    -  Cefazolin: S <=8    -  Cefepime: S <=4    -  Cefepime: S <=4    -  Cefoxitin: S <=8    -  Cefoxitin: S <=8    -  Ceftriaxone: S <=1 Enterobacter, Citrobacter, and Serratia may develop resistance during prolonged therapy    -  Ceftriaxone: S <=1 Enterobacter, Citrobacter, and Serratia may develop resistance during prolonged therapy    -  Ciprofloxacin: S <=1    -  Ciprofloxacin: S <=1    -  Ertapenem: S <=1    -  Ertapenem: S <=1    -  Gentamicin: S <=4    -  Gentamicin: S <=4    -  Imipenem: S <=1    -  Meropenem: S <=1    -  Meropenem: S <=1    -  Piperacillin/Tazobactam: S <=16    -  Piperacillin/Tazobactam: S <=16    Specimen Source: .Surgical Swab SCROTAL WOUND # 1    Culture Results:   Few Proteus mirabilis  Few Escherichia coli  Moderate Bacteroides thetaiotaomicron "Susceptibilities not performed"    Organism Identification: Proteus mirabilis  Escherichia coli    Organism: Proteus mirabilis    Organism: Escherichia coli    Method Type: JANESSA    Method Type: JANESSA      Ca    8.2<L>      12 Mar 2019 06:35        Culture - Blood (collected 10 Mar 2019 21:50)  Source: .Blood None  Preliminary Report (12 Mar 2019 01:02):    No growth to date.    Culture - Blood (collected 10 Mar 2019 21:50)  Source: .Blood None  Gram Stain (12 Mar 2019 17:26):    Growth in anaerobic bottle: Gram Negative Rods  Final Report (13 Mar 2019 19:21):    Growth in anaerobic bottle: Bacteroides fragilis    "Susceptibilities not performed"      Culture - Urine (collected 10 Mar 2019 21:43)  Source: .Urine Catheterized  Final Report (12 Mar 2019 19:14):    >100,000 CFU/ml Escherichia coli  Organism: Escherichia coli (12 Mar 2019 19:14)  Organism: Escherichia coli (12 Mar 2019 19:14)      -  Amikacin: S <=16      -  Ampicillin: S <=8 These ampicillin results predict results for amoxicillin      -  Ampicillin/Sulbactam: S <=8/4      -  Aztreonam: S <=4      -  Cefazolin: S <=8 For uncomplicated UTI with K. pneumoniae, E. coli, or P. mirablis: JANESSA <=16 is sensitive and JANESSA >=32 is resistant. This also predicts results for oral agents cefaclor, cefdinir, cefpodoxime, cefprozil, cefuroxime axetil, cephalexin and locarbef for uncomplicated UTI. Note that some isolates may be susceptible to these agents while testing resistant to cefazolin.      -  Cefepime: S <=4      -  Cefoxitin: S <=8      -  Ceftriaxone: S <=1 Enterobacter, Citrobacter, and Serratia may develop resistance during prolonged therapy      -  Ciprofloxacin: S <=1      -  Ertapenem: S <=1      -  Gentamicin: S <=4      -  Imipenem: S <=1      -  Levofloxacin: S <=2      -  Meropenem: S <=1      -  Nitrofurantoin: S <=32 Should not be used to treat pyelonephritis      -  Piperacillin/Tazobactam: S <=16      -  Tigecycline: S <=2      -  Tobramycin: S <=4      -  Trimethoprim/Sulfamethoxazole: S <=2/38      Method Type: JANESSA        Radiology: all available radiological tests reviewed    < from: CT Pelvis No Cont (03.12.19 @ 13:36) >  Extensive soft tissue gas in the left scrotum suspicious for Teetee's   gangrene.  Left groin hernia containing nonobstructed sigmoid colon.  Partially visualized soft tissue mass surrounding the aorta suspicious   for metastatic lymphadenopathy.    < end of copied text >    < from: US Testicles (03.12.19 @ 09:46) >  Diffuse scrotal wall emphysema suspicious for gas-forming infection.  Small complex left hydrocele.  Unremarkable testes.    < end of copied text >      Advanced directives addressed: full resuscitation

## 2019-03-19 RX ADMIN — MORPHINE SULFATE 15 MILLIGRAM(S): 50 CAPSULE, EXTENDED RELEASE ORAL at 18:29

## 2019-03-19 RX ADMIN — Medication 40 MILLIGRAM(S): at 14:13

## 2019-03-19 RX ADMIN — MORPHINE SULFATE 15 MILLIGRAM(S): 50 CAPSULE, EXTENDED RELEASE ORAL at 05:42

## 2019-03-19 RX ADMIN — PIPERACILLIN AND TAZOBACTAM 25 GRAM(S): 4; .5 INJECTION, POWDER, LYOPHILIZED, FOR SOLUTION INTRAVENOUS at 14:13

## 2019-03-19 RX ADMIN — PIPERACILLIN AND TAZOBACTAM 25 GRAM(S): 4; .5 INJECTION, POWDER, LYOPHILIZED, FOR SOLUTION INTRAVENOUS at 21:50

## 2019-03-19 RX ADMIN — Medication 100 MILLIGRAM(S): at 05:42

## 2019-03-19 RX ADMIN — Medication 100 MILLIGRAM(S): at 14:14

## 2019-03-19 RX ADMIN — PIPERACILLIN AND TAZOBACTAM 25 GRAM(S): 4; .5 INJECTION, POWDER, LYOPHILIZED, FOR SOLUTION INTRAVENOUS at 05:43

## 2019-03-19 RX ADMIN — MORPHINE SULFATE 15 MILLIGRAM(S): 50 CAPSULE, EXTENDED RELEASE ORAL at 05:44

## 2019-03-19 RX ADMIN — AMLODIPINE BESYLATE 5 MILLIGRAM(S): 2.5 TABLET ORAL at 05:42

## 2019-03-19 RX ADMIN — Medication 100 MILLIGRAM(S): at 21:53

## 2019-03-19 RX ADMIN — HEPARIN SODIUM 5000 UNIT(S): 5000 INJECTION INTRAVENOUS; SUBCUTANEOUS at 18:29

## 2019-03-19 RX ADMIN — HEPARIN SODIUM 5000 UNIT(S): 5000 INJECTION INTRAVENOUS; SUBCUTANEOUS at 05:42

## 2019-03-19 NOTE — PROGRESS NOTE ADULT - SUBJECTIVE AND OBJECTIVE BOX
Date of service: 03-19-19 @ 11:23    Lying in bed in NAD  Perineal area feels better; denies pain  No fever or chills    ROS: denies dizziness, no HA, no SOB or cough, no abdominal pain, no diarrhea or constipation; no dysuria, no legs pain, no rashes    MEDICATIONS  (STANDING):  amLODIPine   Tablet 5 milliGRAM(s) Oral daily  docusate sodium 100 milliGRAM(s) Oral three times a day  furosemide   Injectable 40 milliGRAM(s) IV Push once  heparin  Injectable 5000 Unit(s) SubCutaneous every 12 hours  morphine ER Tablet 15 milliGRAM(s) Oral two times a day  piperacillin/tazobactam IVPB. 3.375 Gram(s) IV Intermittent every 8 hours      Vital Signs Last 24 Hrs  T(C): 36.6 (19 Mar 2019 11:17), Max: 36.7 (19 Mar 2019 04:36)  T(F): 97.8 (19 Mar 2019 11:17), Max: 98 (19 Mar 2019 04:36)  HR: 56 (19 Mar 2019 11:17) (53 - 56)  BP: 107/51 (19 Mar 2019 11:17) (101/50 - 107/51)  BP(mean): --  RR: 18 (19 Mar 2019 11:17) (17 - 18)  SpO2: 96% (19 Mar 2019 11:17) (94% - 98%)    Physical Exam:      Constitutional: frail looking  HEENT: NC/AT, EOMI, PERRLA, conjunctivae clear  Neck: supple; thyroid not palpable  Back: no tenderness  Respiratory: respiratory effort normal; decreased BS at bases  Cardiovascular: S1S2 regular, no murmurs  Abdomen: soft, not tender, not distended, positive BS  Genitourinary: no suprapubic tenderness; scrotal edema and erythema and open wound s/p surgery - improving  Lymphatic: no LN palpable  Musculoskeletal: no muscle tenderness, no joint swelling or tenderness  Extremities: no pedal edema  Neurological/ Psychiatric: AxOx3, moving all extremities  Skin: no rashes; no palpable lesions    Labs: reviewed                        9.0    8.84  )-----------( 274      ( 18 Mar 2019 05:37 )             28.1     03-18    137  |  107  |  31<H>  ----------------------------<  106<H>  4.5   |  26  |  1.03    Ca    7.9<L>      18 Mar 2019 05:37    TPro  5.3<L>  /  Alb  1.6<L>  /  TBili  0.4  /  DBili  x   /  AST  22  /  ALT  24  /  AlkPhos  109  03-18                                   9.7    9.38  )-----------( 166      ( 14 Mar 2019 05:57 )             30.0     03-14    141  |  111<H>  |  53<H>  ----------------------------<  141<H>  4.1   |  24  |  1.28    Ca    8.2<L>      14 Mar 2019 05:57    TPro  5.2<L>  /  Alb  1.7<L>  /  TBili  0.6  /  DBili  x   /  AST  33  /  ALT  25  /  AlkPhos  102  03-14    Vancomycin Level, Trough: 9.5 ug/mL (03-14 @ 05:57)      03-12    143  |  111<H>  |  64<H>  ----------------------------<  141<H>  3.7   |  22  |  1.76<H>    Culture - Surgical Swab (03.12.19 @ 20:06)    -  Levofloxacin: I 4    -  Levofloxacin: S <=2    -  Tobramycin: S <=4    -  Tobramycin: S <=4    -  Trimethoprim/Sulfamethoxazole: S <=2/38    -  Trimethoprim/Sulfamethoxazole: S <=2/38    -  Amikacin: S <=16    -  Amikacin: S <=16    -  Ampicillin: S <=8 These ampicillin results predict results for amoxicillin    -  Ampicillin: S <=8 These ampicillin results predict results for amoxicillin    -  Ampicillin/Sulbactam: S <=8/4    -  Ampicillin/Sulbactam: S <=8/4    -  Aztreonam: R >16    -  Aztreonam: S <=4    -  Cefazolin: S <=8    -  Cefazolin: S <=8    -  Cefepime: S <=4    -  Cefepime: S <=4    -  Cefoxitin: S <=8    -  Cefoxitin: S <=8    -  Ceftriaxone: S <=1 Enterobacter, Citrobacter, and Serratia may develop resistance during prolonged therapy    -  Ceftriaxone: S <=1 Enterobacter, Citrobacter, and Serratia may develop resistance during prolonged therapy    -  Ciprofloxacin: S <=1    -  Ciprofloxacin: S <=1    -  Ertapenem: S <=1    -  Ertapenem: S <=1    -  Gentamicin: S <=4    -  Gentamicin: S <=4    -  Imipenem: S <=1    -  Meropenem: S <=1    -  Meropenem: S <=1    -  Piperacillin/Tazobactam: S <=16    -  Piperacillin/Tazobactam: S <=16    Specimen Source: .Surgical Swab SCROTAL WOUND # 1    Culture Results:   Few Proteus mirabilis  Few Escherichia coli  Moderate Bacteroides thetaiotaomicron "Susceptibilities not performed"    Organism Identification: Proteus mirabilis  Escherichia coli    Organism: Proteus mirabilis    Organism: Escherichia coli    Method Type: JANESSA    Method Type: JANESSA      Ca    8.2<L>      12 Mar 2019 06:35        Culture - Blood (collected 10 Mar 2019 21:50)  Source: .Blood None  Preliminary Report (12 Mar 2019 01:02):    No growth to date.    Culture - Blood (collected 10 Mar 2019 21:50)  Source: .Blood None  Gram Stain (12 Mar 2019 17:26):    Growth in anaerobic bottle: Gram Negative Rods  Final Report (13 Mar 2019 19:21):    Growth in anaerobic bottle: Bacteroides fragilis    "Susceptibilities not performed"      Culture - Urine (collected 10 Mar 2019 21:43)  Source: .Urine Catheterized  Final Report (12 Mar 2019 19:14):    >100,000 CFU/ml Escherichia coli  Organism: Escherichia coli (12 Mar 2019 19:14)  Organism: Escherichia coli (12 Mar 2019 19:14)      -  Amikacin: S <=16      -  Ampicillin: S <=8 These ampicillin results predict results for amoxicillin      -  Ampicillin/Sulbactam: S <=8/4      -  Aztreonam: S <=4      -  Cefazolin: S <=8 For uncomplicated UTI with K. pneumoniae, E. coli, or P. mirablis: JANESSA <=16 is sensitive and JANESSA >=32 is resistant. This also predicts results for oral agents cefaclor, cefdinir, cefpodoxime, cefprozil, cefuroxime axetil, cephalexin and locarbef for uncomplicated UTI. Note that some isolates may be susceptible to these agents while testing resistant to cefazolin.      -  Cefepime: S <=4      -  Cefoxitin: S <=8      -  Ceftriaxone: S <=1 Enterobacter, Citrobacter, and Serratia may develop resistance during prolonged therapy      -  Ciprofloxacin: S <=1      -  Ertapenem: S <=1      -  Gentamicin: S <=4      -  Imipenem: S <=1      -  Levofloxacin: S <=2      -  Meropenem: S <=1      -  Nitrofurantoin: S <=32 Should not be used to treat pyelonephritis      -  Piperacillin/Tazobactam: S <=16      -  Tigecycline: S <=2      -  Tobramycin: S <=4      -  Trimethoprim/Sulfamethoxazole: S <=2/38      Method Type: JANESSA        Radiology: all available radiological tests reviewed    < from: CT Pelvis No Cont (03.12.19 @ 13:36) >  Extensive soft tissue gas in the left scrotum suspicious for Teetee's   gangrene.  Left groin hernia containing nonobstructed sigmoid colon.  Partially visualized soft tissue mass surrounding the aorta suspicious   for metastatic lymphadenopathy.    < end of copied text >    < from: US Testicles (03.12.19 @ 09:46) >  Diffuse scrotal wall emphysema suspicious for gas-forming infection.  Small complex left hydrocele.  Unremarkable testes.    < end of copied text >      Advanced directives addressed: full resuscitation

## 2019-03-19 NOTE — CHART NOTE - NSCHARTNOTEFT_GEN_A_CORE
EP has been following this patient for asymptomatic Bradycardia Pt remains with heart rate 40-50 BPM in atrial fibrillation during wake hours.  ID continues to follow patient with positive blood cultures.  Pt is S/P scrotal surgery.    EP will see this patient PRN as he is stable form EP point of view. EP has been following this patient for asymptomatic Bradycardia.  Patient has had falls in the past, it isn't clear if they were mechanical falls.  On the monitor since admission he hasn't had any significant pauses  in his heart rate.  During sleep his HR is 30-40 and during the day his VR is 40-50 BPM. He remains  in atrial Fib>  ID is following him as his scrotum is infected and he has positive blood cultures.    At this point he has been on telemetry and is stable from EP point of view.  EP signing off on this patient and will see PRN

## 2019-03-19 NOTE — PROGRESS NOTE ADULT - SUBJECTIVE AND OBJECTIVE BOX
Patient seen and examined at bedside  Dressing changed by surgical PA today  Minimal pain  No f/c/n/v  Feels well    Medications  acetaminophen   Tablet .. 650 milliGRAM(s) Oral every 6 hours PRN  acetaminophen   Tablet .. 650 milliGRAM(s) Oral every 6 hours PRN  docusate sodium 100 milliGRAM(s) Oral three times a day  heparin  Injectable 5000 Unit(s) SubCutaneous every 12 hours  morphine ER Tablet 15 milliGRAM(s) Oral two times a day  oxyCODONE    IR 5 milliGRAM(s) Oral every 4 hours PRN  piperacillin/tazobactam IVPB. 3.375 Gram(s) IV Intermittent every 8 hours  senna 2 Tablet(s) Oral at bedtime PRN      ROS  General: No weight change, fevers, chills  Ophthalmologic: No eye pain,  swelling,  ENMT: No hearing changes,  ear pain  Respiratory and Thorax: No cough, sputum, dyspnea, wheezing  Cardiovascular: No chest pain, SOB  Gastrointestinal:	No diarrhea, constipation, nausea, vomiting,  Genitourinary: see above  Neurological: No syncope, loss of consciousness, headache, dizziness  Psychiatric: No SI/HI/AH/VH.      Vital Signs Last 24 Hrs  T(C): 36.3 (19 Mar 2019 17:17), Max: 36.7 (19 Mar 2019 04:36)  T(F): 97.4 (19 Mar 2019 17:17), Max: 98 (19 Mar 2019 04:36)  HR: 47 (19 Mar 2019 17:17) (47 - 56)  BP: 93/45 (19 Mar 2019 17:17) (93/45 - 107/51)  BP(mean): --  RR: 18 (19 Mar 2019 11:17) (17 - 18)  SpO2: 97% (19 Mar 2019 17:17) (96% - 98%)    PHYSICAL EXAM:  Constitutional: elderly male, alert and cooperative, NAD, lying in bed comfortably   Eyes: EOMI  Back: no CVA tenderness bilaterally  Respiratory: no labored breathing  Cardiovascular: no cyanosis, or pallor. Extremities are warm and well perfused.   Gastrointestinal: soft, non-tender, non-distended, no guarding, no rebound tenderness. Bladder non-palpable  Genitourinary: zhou in place - draining yellow urine. b/l testicles exposed but appear viable. No purulence noted. No crepitus. +minimal duskiness of outer edges of skin at incised left edge of wound - stable in nature, not progressing,  Neurological:  A&O x3      I/O    03-18-19 @ 07:01  -  03-19-19 @ 07:00  --------------------------------------------------------  IN: 0 mL / OUT: 1000 mL / NET: -1000 mL        Labs:  CBC Full  -  ( 18 Mar 2019 05:37 )  WBC Count : 8.84 K/uL  Hemoglobin : 9.0 g/dL  Hematocrit : 28.1 %  Platelet Count - Automated : 274 K/uL  Mean Cell Volume : 101.4 fl  Mean Cell Hemoglobin : 32.5 pg  Mean Cell Hemoglobin Concentration : 32.0 gm/dL  Auto Neutrophil # : 6.30 K/uL  Auto Lymphocyte # : 1.64 K/uL  Auto Monocyte # : 0.60 K/uL  Auto Eosinophil # : 0.23 K/uL  Auto Basophil # : 0.01 K/uL  Auto Neutrophil % : 71.2 %  Auto Lymphocyte % : 18.6 %  Auto Monocyte % : 6.8 %  Auto Eosinophil % : 2.6 %  Auto Basophil % : 0.1 %    03-18    137  |  107  |  31<H>  ----------------------------<  106<H>  4.5   |  26  |  1.03    Ca    7.9<L>      18 Mar 2019 05:37    TPro  5.3<L>  /  Alb  1.6<L>  /  TBili  0.4  /  DBili  x   /  AST  22  /  ALT  24  /  AlkPhos  109  03-18

## 2019-03-19 NOTE — PROGRESS NOTE ADULT - ASSESSMENT
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 Bacteremia    POD#7   Wet to dry dressing changed at bedside.    Plan:    -continue daily wet to dry dressing changes  -continue IV abx as per ID  -continue zhou catheter to gravity drainage  -plastic surgery note appreciated - plan for reconstructive surgery on 3/21

## 2019-03-19 NOTE — PROGRESS NOTE ADULT - SUBJECTIVE AND OBJECTIVE BOX
Asked by Dr. Chamorro to do wet to dry perineal dressing.  Abds and kerlix removed.  Jay in place draining yellow.  No crepitus.  No purulence or bleeding.  Kerlix dampened with saline and placed over wound.  Abds placed on top.  Pt tolerated well.

## 2019-03-19 NOTE — PROGRESS NOTE ADULT - SUBJECTIVE AND OBJECTIVE BOX
CC: Recurrent Falls (19 Mar 2019 17:27)    HPI:  89 y/o M PMHx significant for hypertension, hyperlipidemia, meningioma, recurrent mechanical falls, and cervical myelopathy/radiculopathy presents to the ED for further evaluation of an unwitnessed fall last night. In the ED the patient was found to have notable skin tears to his right upper arm. The patient notes that the fall occurred upon ambulating to the bathroom and was unable to get up on his own. Labs => WBC 12.09, Hgb/Hct 11.1/32.8, LA 1.6, Alb 2.6, BUN/Cr 64/1.80, , TnI (-) x 3, Bands 17%. In the ED the patient received NS x 1.5L. (10 Mar 2019 23:00)    INTERVAL HPI/ OVERNIGHT EVENTS: Chart reviewed, Pt was seen and examined, reports feeling better, no pain. NP CP or SOB. No fevers. POC discussed. Encouraged PO intake     Vital Signs Last 24 Hrs  T(C): 36.3 (19 Mar 2019 17:17), Max: 36.7 (19 Mar 2019 04:36)  T(F): 97.4 (19 Mar 2019 17:17), Max: 98 (19 Mar 2019 04:36)  HR: 47 (19 Mar 2019 17:17) (47 - 56)  BP: 93/45 (19 Mar 2019 17:17) (93/45 - 107/51)  RR: 18 (19 Mar 2019 11:17) (17 - 18)  SpO2: 97% (19 Mar 2019 17:17) (96% - 98%)          REVIEW OF SYSTEMS:  All other review of systems is negative unless indicated above.      PHYSICAL EXAM:  General: Well developed; well nourished; in no acute distress  Eyes: PERRLA, EOMI; conjunctiva and sclera clear  Head: Normocephalic; atraumatic  ENMT: No nasal discharge; airway clear  Neck: Supple; non tender; no masses  Respiratory: No wheezes, rales or rhonchi  Cardiovascular: Regular rate and rhythm. S1 and S2 Normal; No murmurs, gallops or rubs  Gastrointestinal: Soft non-tender non-distended; Normal bowel sounds  Genitourinary: No costovertebral angle tenderness  Extremities: Normal range of motion, No clubbing, cyanosis or edema  Vascular: Peripheral pulses palpable 2+ bilaterally  Neurological: Alert and oriented x4  Skin: Warm and dry. No acute rash  Lymph Nodes: No acute cervical adenopathy  Musculoskeletal: Normal gait, tone, without deformities  Psychiatric: Cooperative and appropriate                LABS:               9.0    8.84  )-----------( 274      ( 18 Mar 2019 05:37 )             28.1     18 Mar 2019 05:37    137    |  107    |  31     ----------------------------<  106    4.5     |  26     |  1.03     Ca    7.9        18 Mar 2019 05:37    TPro  5.3    /  Alb  1.6    /  TBili  0.4    /  DBili  x      /  AST  22     /  ALT  24     /  AlkPhos  109    18 Mar 2019 05:37      LIVER FUNCTIONS - ( 18 Mar 2019 05:37 )  Alb: 1.6 g/dL / Pro: 5.3 gm/dL / ALK PHOS: 109 U/L / ALT: 24 U/L / AST: 22 U/L / GGT: x               MEDICATIONS  (STANDING):  amLODIPine   Tablet 5 milliGRAM(s) Oral daily  docusate sodium 100 milliGRAM(s) Oral three times a day  heparin  Injectable 5000 Unit(s) SubCutaneous every 12 hours  morphine ER Tablet 15 milliGRAM(s) Oral two times a day  piperacillin/tazobactam IVPB. 3.375 Gram(s) IV Intermittent every 8 hours    MEDICATIONS  (PRN):  acetaminophen   Tablet .. 650 milliGRAM(s) Oral every 6 hours PRN Temp greater or equal to 38C (100.4F), Mild Pain (1 - 3)  acetaminophen   Tablet .. 650 milliGRAM(s) Oral every 6 hours PRN Mild Pain (1 - 3)  oxyCODONE    IR 5 milliGRAM(s) Oral every 4 hours PRN Moderate Pain (4 - 6)  senna 2 Tablet(s) Oral at bedtime PRN Constipation      RADIOLOGY & ADDITIONAL TESTS:      EXAM:  US DPLX UPR EXT VEINS LTD RT                        PROCEDURE DATE:  03/18/2019        FINDINGS:    The right internal jugular, subclavian, axillary, brachial, basilic and   cephalic veins are patent and compressible where applicable.     Doppler examination shows normal spontaneous and phasic flow.    IMPRESSION:   No evidence of right upper extremity deep venous thrombosis.

## 2019-03-19 NOTE — PROGRESS NOTE ADULT - ASSESSMENT
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, scrotum and penile shaft pod #6  Sepsis with bacteroides/proteus/E coli    UTI E coli  L groin non obstructive hernia  Hypoalbuminemia  Anemia - suspect post op blood loss, stable   JAMIE on CKD II, improved   Bradycardia  Recurrent falls  HTN/Hyperlipidemia  Cervical myelopathy with radic  Borderline BP    P:  C/w  Zosyn IV as per ID   Blood cultures growing Bacteroides   Scrotal cultures: Proteus, E coli and bacteroides sensitive to Zosyn  Pain controlled with current regimen  urology- f/u appreciated > will need definitive tx with plastics ie flap  D/w Plastic Sx, possible procedure on Thursday   EP f/u and clearance  for arrythmia/bradycardia. All AV chary blockers held for > 48 hrs.   TSH ordered  JAMIE on CKD resolved.   Monitor BP, Norvas held.   S/p IV lasix however suspect his edema is related to third spacing from hypoalbuminemia from poor nutrition, better with elevation   doppler  neg for DVT   Long term goal discussed with patient. Has no real support system and lives with wife will need LOBO placement at discharge   Is mostly bedbound and moves only for transfers  dvt px

## 2019-03-19 NOTE — PROGRESS NOTE ADULT - ASSESSMENT
89 y/o Male with h/o hypertension, hyperlipidemia, meningioma, recurrent mechanical falls, cervical myelopathy/radiculopathy was admitted on 3/12 for increased weakness and unwitnessed fall. In the ED the patient was found to have skin tears to his right upper arm. The patient notes that the fall occurred upon ambulating to the bathroom and was unable to get up on his own. In the ED the patient received zosyn and vancomycin IV.    1. S/p sepsis with bacteroides fragilis. UTI with Ecoli. 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 CRF stage 3.   -f/u BC x 2  -wound cultures reviewed  -on zosyn 3.375 gm IV q8h # 7  -tolerating abx well so far; no side effects noted  -renal function is improved  -urology evaluation appreciated; plan for plastics evaluation  -local wound care  -continue abx coverage   -monitor temps  -f/u CBC  -supportive care  2. Other issues:   -care per medicine

## 2019-03-20 LAB
ADD ON TEST-SPECIMEN IN LAB: SIGNIFICANT CHANGE UP
ANION GAP SERPL CALC-SCNC: 3 MMOL/L — LOW (ref 5–17)
BUN SERPL-MCNC: 33 MG/DL — HIGH (ref 7–23)
CALCIUM SERPL-MCNC: 8.3 MG/DL — LOW (ref 8.5–10.1)
CHLORIDE SERPL-SCNC: 103 MMOL/L — SIGNIFICANT CHANGE UP (ref 96–108)
CO2 SERPL-SCNC: 30 MMOL/L — SIGNIFICANT CHANGE UP (ref 22–31)
CREAT SERPL-MCNC: 1.05 MG/DL — SIGNIFICANT CHANGE UP (ref 0.5–1.3)
GLUCOSE SERPL-MCNC: 96 MG/DL — SIGNIFICANT CHANGE UP (ref 70–99)
HCT VFR BLD CALC: 28.7 % — LOW (ref 39–50)
HGB BLD-MCNC: 9 G/DL — LOW (ref 13–17)
MCHC RBC-ENTMCNC: 31.4 GM/DL — LOW (ref 32–36)
MCHC RBC-ENTMCNC: 32 PG — SIGNIFICANT CHANGE UP (ref 27–34)
MCV RBC AUTO: 102.1 FL — HIGH (ref 80–100)
NRBC # BLD: 0 /100 WBCS — SIGNIFICANT CHANGE UP (ref 0–0)
PLATELET # BLD AUTO: 320 K/UL — SIGNIFICANT CHANGE UP (ref 150–400)
POTASSIUM SERPL-MCNC: 4.4 MMOL/L — SIGNIFICANT CHANGE UP (ref 3.5–5.3)
POTASSIUM SERPL-SCNC: 4.4 MMOL/L — SIGNIFICANT CHANGE UP (ref 3.5–5.3)
RBC # BLD: 2.81 M/UL — LOW (ref 4.2–5.8)
RBC # FLD: 14.5 % — SIGNIFICANT CHANGE UP (ref 10.3–14.5)
SODIUM SERPL-SCNC: 136 MMOL/L — SIGNIFICANT CHANGE UP (ref 135–145)
T4 FREE SERPL-MCNC: 1.06 NG/DL — SIGNIFICANT CHANGE UP (ref 0.76–1.46)
TSH SERPL-MCNC: 6.93 UU/ML — HIGH (ref 0.34–4.82)
WBC # BLD: 8.75 K/UL — SIGNIFICANT CHANGE UP (ref 3.8–10.5)
WBC # FLD AUTO: 8.75 K/UL — SIGNIFICANT CHANGE UP (ref 3.8–10.5)

## 2019-03-20 RX ADMIN — PIPERACILLIN AND TAZOBACTAM 25 GRAM(S): 4; .5 INJECTION, POWDER, LYOPHILIZED, FOR SOLUTION INTRAVENOUS at 21:42

## 2019-03-20 RX ADMIN — Medication 100 MILLIGRAM(S): at 21:38

## 2019-03-20 RX ADMIN — MORPHINE SULFATE 15 MILLIGRAM(S): 50 CAPSULE, EXTENDED RELEASE ORAL at 07:15

## 2019-03-20 RX ADMIN — Medication 100 MILLIGRAM(S): at 06:33

## 2019-03-20 RX ADMIN — HEPARIN SODIUM 5000 UNIT(S): 5000 INJECTION INTRAVENOUS; SUBCUTANEOUS at 06:31

## 2019-03-20 RX ADMIN — Medication 100 MILLIGRAM(S): at 13:33

## 2019-03-20 RX ADMIN — PIPERACILLIN AND TAZOBACTAM 25 GRAM(S): 4; .5 INJECTION, POWDER, LYOPHILIZED, FOR SOLUTION INTRAVENOUS at 06:30

## 2019-03-20 RX ADMIN — PIPERACILLIN AND TAZOBACTAM 25 GRAM(S): 4; .5 INJECTION, POWDER, LYOPHILIZED, FOR SOLUTION INTRAVENOUS at 13:33

## 2019-03-20 RX ADMIN — MORPHINE SULFATE 15 MILLIGRAM(S): 50 CAPSULE, EXTENDED RELEASE ORAL at 06:33

## 2019-03-20 RX ADMIN — HEPARIN SODIUM 5000 UNIT(S): 5000 INJECTION INTRAVENOUS; SUBCUTANEOUS at 17:13

## 2019-03-20 NOTE — PROGRESS NOTE ADULT - ASSESSMENT
89 y/o M PMHx significant for hypertension, hyperlipidemia, meningioma, recurrent mechanical falls, and cervical myelopathy/radiculopathy  admitted for:     1) S/p Fall  - mechanical vs syncope?   - found to be septic   - telemetry: AFIB with bradycardia  - EP eval appreciated:  no significant pauses, evelyn mostly at night, no need for further intervention   - TSH elevated, will check T3/T4  - fall precautions  - PT       2)  Sepsis with bacteroides in Blood,  Cellulitis with proteus/E coli/ Bacteroides, E.Coli UTI.  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 Hypoalbuminemia    encourage oral intake, supplements       6) Peripheral edema  likely due to IV hydration and hypoalbuminemia   Doppler neg for DVT  Elevate extremities  Edema improved       7) Elevated TSH  will check T3/T4    8) AFIB with slow ventricular response  New DX?   Metoprolol held   [id number] score         C/w  Zosyn IV as per ID   Blood cultures growing Bacteroides   Scrotal cultures: Proteus, E coli and bacteroides sensitive to Zosyn  Pain controlled with current regimen  urology- f/u appreciated > will need definitive tx with plastics ie flap  D/w Plastic Sx, possible procedure on Thursday   EP f/u and clearance  for arrythmia/bradycardia. All AV chary blockers held for > 48 hrs.   TSH ordered  JAMIE on CKD resolved.   Monitor BP, Norvas held.   S/p IV lasix however suspect his edema is related to third spacing from hypoalbuminemia from poor nutrition, better with elevation   doppler  neg for DVT   Long term goal discussed with patient. Has no real support system and lives with wife will need LOBO placement at discharge   Is mostly bedbound and moves only for transfers  dvt px 89 y/o M PMHx significant for hypertension, hyperlipidemia, meningioma, recurrent mechanical falls, and cervical myelopathy/radiculopathy  admitted for:     1) S/p Fall  - mechanical vs syncope?   - found to be septic   - telemetry: AFIB with bradycardia  - EP eval appreciated:  no significant pauses, evelyn mostly at night, no need for further intervention   - TSH elevated, will check T3/T4  - fall precautions  - PT       2)  Sepsis with bacteroides in Blood,  Cellulitis with proteus/E coli/ Bacteroides, E.Coli UTI.  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 Hypoalbuminemia    encourage oral intake, supplements       6) Peripheral edema  likely due to IV hydration and hypoalbuminemia   Doppler neg for DVT  Elevate extremities  Edema improved       7) Elevated TSH  will check T3/T4    8) AFIB with slow ventricular response  New DX?   Metoprolol held   [id number] score: 5, high risk   Will ask cardio input for a/c recommendations     9) Soft tissue mass surrounding proximal aorta, suspicious for metastatic lymphadenopathy   incidental finding on CT   No h/o malignancy as per Pt, but not following with PCP   Hem/Onc eval       10) DVT PPxs     11) Preop clearance:   Pt has no H/o IHD  or Chronic Lung Dx, no active CP or SOB or signs of HF. [facility] preop estimated risk probability for Postop MI or Cardiac arrest is 2.64%.   Pt is medically optimized for planned procedure       Dispo: NPO after midnight. Hold DVT PPxs as per Sx

## 2019-03-20 NOTE — PROGRESS NOTE ADULT - SUBJECTIVE AND OBJECTIVE BOX
Patient seen and examined at bedside  No acute events overnight  Feels well  Anxious to leave hospital  No fevers, chills, nausea, vomiting    Medications  acetaminophen   Tablet .. 650 milliGRAM(s) Oral every 6 hours PRN  acetaminophen   Tablet .. 650 milliGRAM(s) Oral every 6 hours PRN  docusate sodium 100 milliGRAM(s) Oral three times a day  heparin  Injectable 5000 Unit(s) SubCutaneous every 12 hours  morphine ER Tablet 15 milliGRAM(s) Oral two times a day  oxyCODONE    IR 5 milliGRAM(s) Oral every 4 hours PRN  piperacillin/tazobactam IVPB. 3.375 Gram(s) IV Intermittent every 8 hours  senna 2 Tablet(s) Oral at bedtime PRN      ROS  General: No weight change, fevers, chills  Ophthalmologic: No eye pain,  swelling,  ENMT: No hearing changes,  ear pain  Respiratory and Thorax: No cough, sputum, dyspnea, wheezing  Cardiovascular: No chest pain, SOB  Gastrointestinal:	No diarrhea, constipation, nausea, vomiting,  Genitourinary: see above  Neurological: No syncope, loss of consciousness, headache, dizziness  Psychiatric: No SI/HI/AH/VH.    Vital Signs Last 24 Hrs  T(C): 36.3 (20 Mar 2019 05:07), Max: 36.6 (19 Mar 2019 11:17)  T(F): 97.4 (20 Mar 2019 05:07), Max: 97.8 (19 Mar 2019 11:17)  HR: 53 (20 Mar 2019 05:07) (47 - 56)  BP: 104/51 (20 Mar 2019 05:07) (93/45 - 107/51)  BP(mean): --  RR: 19 (20 Mar 2019 05:07) (18 - 19)  SpO2: 97% (20 Mar 2019 05:07) (96% - 97%)    PHYSICAL EXAM:  Constitutional: elderly male, alert and cooperative, NAD, lying in bed comfortably   Eyes: EOMI  Back: no CVA tenderness bilaterally  Respiratory: no labored breathing  Cardiovascular: no cyanosis, or pallor. Extremities are warm and well perfused.   Gastrointestinal: soft, non-tender, non-distended, no guarding, no rebound tenderness. Bladder non-palpable  Genitourinary: zhou in place - draining yellow urine. b/l testicles exposed but appear viable. No purulence noted. No crepitus. +minimal duskiness of outer edges of skin at incised left edge of wound - stable in nature, not progressing,  Neurological:  A&O x3    I/O    03-19-19 @ 07:01  -  03-20-19 @ 07:00  --------------------------------------------------------  IN: 0 mL / OUT: 1000 mL / NET: -1000 mL      Labs:  CBC Full  -  ( 20 Mar 2019 05:35 )  WBC Count : 8.75 K/uL  Hemoglobin : 9.0 g/dL  Hematocrit : 28.7 %  Platelet Count - Automated : 320 K/uL  Mean Cell Volume : 102.1 fl  Mean Cell Hemoglobin : 32.0 pg  Mean Cell Hemoglobin Concentration : 31.4 gm/dL  Auto Neutrophil # : x  Auto Lymphocyte # : x  Auto Monocyte # : x  Auto Eosinophil # : x  Auto Basophil # : x  Auto Neutrophil % : x  Auto Lymphocyte % : x  Auto Monocyte % : x  Auto Eosinophil % : x  Auto Basophil % : x    03-20    136  |  103  |  33<H>  ----------------------------<  96  4.4   |  30  |  1.05    Ca    8.3<L>      20 Mar 2019 05:35

## 2019-03-20 NOTE — PROGRESS NOTE ADULT - SUBJECTIVE AND OBJECTIVE BOX
Date of service: 03-20-19 @ 12:45    Lying in bed in NAD  Weak looking  No new complaints    ROS: no fever or chills; no HA, no SOB or cough, no abdominal pain, no diarrhea or constipation; no dysuria, no legs pain, no rashes    MEDICATIONS  (STANDING):  docusate sodium 100 milliGRAM(s) Oral three times a day  heparin  Injectable 5000 Unit(s) SubCutaneous every 12 hours  morphine ER Tablet 15 milliGRAM(s) Oral two times a day  piperacillin/tazobactam IVPB. 3.375 Gram(s) IV Intermittent every 8 hours      Vital Signs Last 24 Hrs  T(C): 36.8 (20 Mar 2019 10:57), Max: 36.8 (20 Mar 2019 10:57)  T(F): 98.3 (20 Mar 2019 10:57), Max: 98.3 (20 Mar 2019 10:57)  HR: 60 (20 Mar 2019 10:57) (47 - 60)  BP: 98/51 (20 Mar 2019 10:57) (93/45 - 104/51)  BP(mean): --  RR: 18 (20 Mar 2019 10:57) (18 - 19)  SpO2: 96% (20 Mar 2019 10:57) (96% - 97%)    Physical Exam:      Constitutional: frail looking  HEENT: NC/AT, EOMI, PERRLA, conjunctivae clear  Neck: supple; thyroid not palpable  Back: no tenderness  Respiratory: respiratory effort normal; decreased BS at bases  Cardiovascular: S1S2 regular, no murmurs  Abdomen: soft, not tender, not distended, positive BS  Genitourinary: no suprapubic tenderness; scrotal edema and erythema and open wound s/p surgery - improving slowly  Lymphatic: no LN palpable  Musculoskeletal: no muscle tenderness, no joint swelling or tenderness  Extremities: no pedal edema  Neurological/ Psychiatric: AxOx3, moving all extremities  Skin: no rashes; no palpable lesions    Labs: reviewed                        9.0    8.75  )-----------( 320      ( 20 Mar 2019 05:35 )             28.7     03-20    136  |  103  |  33<H>  ----------------------------<  96  4.4   |  30  |  1.05    Ca    8.3<L>      20 Mar 2019 05:35               9.7    9.38  )-----------( 166      ( 14 Mar 2019 05:57 )             30.0     03-14    141  |  111<H>  |  53<H>  ----------------------------<  141<H>  4.1   |  24  |  1.28    Ca    8.2<L>      14 Mar 2019 05:57    TPro  5.2<L>  /  Alb  1.7<L>  /  TBili  0.6  /  DBili  x   /  AST  33  /  ALT  25  /  AlkPhos  102  03-14    Vancomycin Level, Trough: 9.5 ug/mL (03-14 @ 05:57)      03-12    143  |  111<H>  |  64<H>  ----------------------------<  141<H>  3.7   |  22  |  1.76<H>    Culture - Surgical Swab (03.12.19 @ 20:06)    -  Levofloxacin: I 4    -  Levofloxacin: S <=2    -  Tobramycin: S <=4    -  Tobramycin: S <=4    -  Trimethoprim/Sulfamethoxazole: S <=2/38    -  Trimethoprim/Sulfamethoxazole: S <=2/38    -  Amikacin: S <=16    -  Amikacin: S <=16    -  Ampicillin: S <=8 These ampicillin results predict results for amoxicillin    -  Ampicillin: S <=8 These ampicillin results predict results for amoxicillin    -  Ampicillin/Sulbactam: S <=8/4    -  Ampicillin/Sulbactam: S <=8/4    -  Aztreonam: R >16    -  Aztreonam: S <=4    -  Cefazolin: S <=8    -  Cefazolin: S <=8    -  Cefepime: S <=4    -  Cefepime: S <=4    -  Cefoxitin: S <=8    -  Cefoxitin: S <=8    -  Ceftriaxone: S <=1 Enterobacter, Citrobacter, and Serratia may develop resistance during prolonged therapy    -  Ceftriaxone: S <=1 Enterobacter, Citrobacter, and Serratia may develop resistance during prolonged therapy    -  Ciprofloxacin: S <=1    -  Ciprofloxacin: S <=1    -  Ertapenem: S <=1    -  Ertapenem: S <=1    -  Gentamicin: S <=4    -  Gentamicin: S <=4    -  Imipenem: S <=1    -  Meropenem: S <=1    -  Meropenem: S <=1    -  Piperacillin/Tazobactam: S <=16    -  Piperacillin/Tazobactam: S <=16    Specimen Source: .Surgical Swab SCROTAL WOUND # 1    Culture Results:   Few Proteus mirabilis  Few Escherichia coli  Moderate Bacteroides thetaiotaomicron "Susceptibilities not performed"    Organism Identification: Proteus mirabilis  Escherichia coli    Organism: Proteus mirabilis    Organism: Escherichia coli    Method Type: JANESSA    Method Type: JANESSA      Ca    8.2<L>      12 Mar 2019 06:35        Culture - Blood (collected 10 Mar 2019 21:50)  Source: .Blood None  Preliminary Report (12 Mar 2019 01:02):    No growth to date.    Culture - Blood (collected 10 Mar 2019 21:50)  Source: .Blood None  Gram Stain (12 Mar 2019 17:26):    Growth in anaerobic bottle: Gram Negative Rods  Final Report (13 Mar 2019 19:21):    Growth in anaerobic bottle: Bacteroides fragilis    "Susceptibilities not performed"      Culture - Urine (collected 10 Mar 2019 21:43)  Source: .Urine Catheterized  Final Report (12 Mar 2019 19:14):    >100,000 CFU/ml Escherichia coli  Organism: Escherichia coli (12 Mar 2019 19:14)  Organism: Escherichia coli (12 Mar 2019 19:14)      -  Amikacin: S <=16      -  Ampicillin: S <=8 These ampicillin results predict results for amoxicillin      -  Ampicillin/Sulbactam: S <=8/4      -  Aztreonam: S <=4      -  Cefazolin: S <=8 For uncomplicated UTI with K. pneumoniae, E. coli, or P. mirablis: JANESSA <=16 is sensitive and JANESSA >=32 is resistant. This also predicts results for oral agents cefaclor, cefdinir, cefpodoxime, cefprozil, cefuroxime axetil, cephalexin and locarbef for uncomplicated UTI. Note that some isolates may be susceptible to these agents while testing resistant to cefazolin.      -  Cefepime: S <=4      -  Cefoxitin: S <=8      -  Ceftriaxone: S <=1 Enterobacter, Citrobacter, and Serratia may develop resistance during prolonged therapy      -  Ciprofloxacin: S <=1      -  Ertapenem: S <=1      -  Gentamicin: S <=4      -  Imipenem: S <=1      -  Levofloxacin: S <=2      -  Meropenem: S <=1      -  Nitrofurantoin: S <=32 Should not be used to treat pyelonephritis      -  Piperacillin/Tazobactam: S <=16      -  Tigecycline: S <=2      -  Tobramycin: S <=4      -  Trimethoprim/Sulfamethoxazole: S <=2/38      Method Type: JANESSA        Radiology: all available radiological tests reviewed    < from: CT Pelvis No Cont (03.12.19 @ 13:36) >  Extensive soft tissue gas in the left scrotum suspicious for Teetee's   gangrene.  Left groin hernia containing nonobstructed sigmoid colon.  Partially visualized soft tissue mass surrounding the aorta suspicious   for metastatic lymphadenopathy.    < end of copied text >    < from: US Testicles (03.12.19 @ 09:46) >  Diffuse scrotal wall emphysema suspicious for gas-forming infection.  Small complex left hydrocele.  Unremarkable testes.    < end of copied text >      Advanced directives addressed: full resuscitation

## 2019-03-20 NOTE — PROGRESS NOTE ADULT - ASSESSMENT
89 y/o Male with h/o hypertension, hyperlipidemia, meningioma, recurrent mechanical falls, cervical myelopathy/radiculopathy was admitted on 3/12 for increased weakness and unwitnessed fall. In the ED the patient was found to have skin tears to his right upper arm. The patient notes that the fall occurred upon ambulating to the bathroom and was unable to get up on his own. In the ED the patient received zosyn and vancomycin IV.    1. S/p sepsis with bacteroides fragilis. UTI with Ecoli resolving. 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 CRF stage 3.   -f/u BC x 2  -wound cultures reviewed  -on zosyn 3.375 gm IV q8h # 8  -tolerating abx well so far; no side effects noted  -renal function is improved  -urology evaluation appreciated  -plan for plastics reconstruction surgery  -local wound care  -continue abx coverage   -monitor temps  -f/u CBC  -supportive care  2. Other issues:   -care per medicine

## 2019-03-20 NOTE — PROGRESS NOTE ADULT - SUBJECTIVE AND OBJECTIVE BOX
CC: Recurrent Falls (19 Mar 2019 17:27)    HPI:  88 y/o M PMHx significant for hypertension, hyperlipidemia, meningioma, recurrent mechanical falls, and cervical myelopathy/radiculopathy presents to the ED for further evaluation of an unwitnessed fall last night. In the ED the patient was found to have notable skin tears to his right upper arm. The patient notes that the fall occurred upon ambulating to the bathroom and was unable to get up on his own. Labs => WBC 12.09, Hgb/Hct 11.1/32.8, LA 1.6, Alb 2.6, BUN/Cr 64/1.80, , TnI (-) x 3, Bands 17%. In the ED the patient received NS x 1.5L. (10 Mar 2019 23:00)    INTERVAL HPI/ OVERNIGHT EVENTS:  Pt was seen and examined,  reports no complains, states that felt confused in awakening this am, feels fine now. Plan for procedure in am discussed. Pt denies CP or SOB.  Reports no history of lung or heart Dz.         Vital Signs Last 24 Hrs  T(C): 36.8 (20 Mar 2019 10:57), Max: 36.8 (20 Mar 2019 10:57)  T(F): 98.3 (20 Mar 2019 10:57), Max: 98.3 (20 Mar 2019 10:57)  HR: 60 (20 Mar 2019 10:57) (47 - 60)  BP: 98/51 (20 Mar 2019 10:57) (93/45 - 104/51)  RR: 18 (20 Mar 2019 10:57) (18 - 19)  SpO2: 96% (20 Mar 2019 10:57) (96% - 97%))      REVIEW OF SYSTEMS:  All other review of systems is negative unless indicated above.      PHYSICAL EXAM:  General: Well developed; well nourished; in no acute distress  Eyes: PERRLA, EOMI; conjunctiva and sclera clear  Head: Normocephalic; atraumatic  ENMT: No nasal discharge; airway clear  Neck: Supple; non tender; no masses  Respiratory: No wheezes, rales or rhonchi  Cardiovascular: Regular rate and rhythm. S1 and S2 Normal; No murmurs, gallops or rubs  Gastrointestinal: Soft non-tender non-distended; Normal bowel sounds. + umbilical hernia, reducible   Genitourinary: No suprapubic  tenderness  Extremities: Normal range of motion, No clubbing, cyanosis or edema  Vascular: Peripheral pulses palpable 2+ bilaterally  Neurological: Alert and oriented x4  Skin: Warm and dry. No acute rash  Lymph Nodes: No acute cervical adenopathy  Musculoskeletal: Normal gait, tone, without deformities  Psychiatric: Cooperative and appropriate                LABS:               9.0    8.84  )-----------( 274      ( 18 Mar 2019 05:37 )             28.1     18 Mar 2019 05:37    137    |  107    |  31     ----------------------------<  106    4.5     |  26     |  1.03     Ca    7.9        18 Mar 2019 05:37    TPro  5.3    /  Alb  1.6    /  TBili  0.4    /  DBili  x      /  AST  22     /  ALT  24     /  AlkPhos  109    18 Mar 2019 05:37      LIVER FUNCTIONS - ( 18 Mar 2019 05:37 )  Alb: 1.6 g/dL / Pro: 5.3 gm/dL / ALK PHOS: 109 U/L / ALT: 24 U/L / AST: 22 U/L / GGT: x           Culture - Surgical Swab (03.12.19 @ 20:06)    Specimen Source: .Surgical Swab SCROTAL WOUND # 2    Culture Results:   Few Proteus mirabilis  Few Escherichia coli  See previous culture [id number]  Moderate Bacteroides fragilis "Susceptibilities not performed"    Culture - Surgical Swab (03.12.19 @ 20:06)    -  Imipenem: S <=1    -  Levofloxacin: I 4    -  Levofloxacin: S <=2    -  Meropenem: S <=1    -  Meropenem: S <=1    -  Piperacillin/Tazobactam: S <=16    -  Piperacillin/Tazobactam: S <=16    -  Tobramycin: S <=4    -  Tobramycin: S <=4    -  Trimethoprim/Sulfamethoxazole: S <=2/38    -  Trimethoprim/Sulfamethoxazole: S <=2/38    -  Amikacin: S <=16    -  Amikacin: S <=16    -  Ampicillin: S <=8 These ampicillin results predict results for amoxicillin    -  Ampicillin: S <=8 These ampicillin results predict results for amoxicillin    -  Ampicillin/Sulbactam: S <=8/4    -  Ampicillin/Sulbactam: S <=8/4    -  Aztreonam: R >16    -  Aztreonam: S <=4    -  Cefazolin: S <=8    -  Cefazolin: S <=8    -  Cefepime: S <=4    -  Cefepime: S <=4    -  Cefoxitin: S <=8    -  Cefoxitin: S <=8    -  Ceftriaxone: S <=1 Enterobacter, Citrobacter, and Serratia may develop resistance during prolonged therapy    -  Ceftriaxone: S <=1 Enterobacter, Citrobacter, and Serratia may develop resistance during prolonged therapy    -  Ciprofloxacin: S <=1    -  Ciprofloxacin: S <=1    -  Ertapenem: S <=1    -  Ertapenem: S <=1    -  Gentamicin: S <=4    -  Gentamicin: S <=4    Specimen Source: .Surgical Swab SCROTAL WOUND # 1    Culture Results:   Few Proteus mirabilis  Few Escherichia coli    Organism: Proteus mirabilis    Organism: Escherichia coli    Culture - Blood (03.10.19 @ 21:50)    -  Multidrug (KPC pos) resistant organism: Nondet    -  Staphylococcus aureus: Nondet    -  Methicillin resistant Staphylococcus aureus (MRSA): Nondet    -  Coagulase negative Staphylococcus: Nondet    -  Enterococcus species: Nondet    -  Vancomycin resistant Enterococcus sp.: Nondet    -  Escherichia coli: Nondet    -  Klebsiella oxytoca: Nondet    -  Klebsiella pneumoniae: Nondet    -  Serratia marcescens: Nondet    -  Haemophilus influenzae: Nondet    -  Listeria monocytogenes: Nondet    -  Neisseria meningitidis: Nondet    -  Pseudomonas aeruginosa: Nondet    -  Acinetobacter baumanii: Nondet    -  Enterobacter cloacae complex: Nondet    -  Streptococcus sp. (Not Grp A, B or S pneumoniae): Nondet    -  Streptococcus agalactiae (Group B): Nondet    -  Streptococcus pyogenes (Group A): Nondet    -  Streptococcus pneumoniae: Nondet    -  Candida albicans: Nondet    -  Candida glabrata: Nondet    -  Candida krusei: Nondet    -  Candida parapsilosis: Nondet    -  Candida tropicalis: Nondet    Gram Stain:   Growth in anaerobic bottle: Gram Negative Rods  Growth in anaerobic bottle: Bacteroides fragilis    Culture - Urine (03.10.19 @ 21:43)    -  Ampicillin: S <=8 These ampicillin results predict results for amoxicillin    -  Aztreonam: S <=4    -  Ampicillin/Sulbactam: S <=8/4    -  Amikacin: S <=16    -  Imipenem: S <=1    -  Gentamicin: S <=4    -  Ertapenem: S <=1    -  Ciprofloxacin: S <=1    -  Ceftriaxone: S <=1 Enterobacter, Citrobacter, and Serratia may develop resistance during prolonged therapy    -  Cefoxitin: S <=8    -  Cefepime: S <=4    -  Cefazolin: S <=8 For uncomplicated UTI with K. pneumoniae, E. coli, or P. mirablis: JANESSA <=16 is sensitive and JANESSA >=32 is resistant. This also predicts results for oral agents cefaclor, cefdinir, cefpodoxime, cefprozil, cefuroxime axetil, cephalexin and locarbef for uncomplicated UTI. Note that some isolates may be susceptible to these agents while testing resistant to cefazolin.    -  Trimethoprim/Sulfamethoxazole: S <=2/38    -  Tigecycline: S <=2    -  Tobramycin: S <=4    -  Nitrofurantoin: S <=32 Should not be used to treat pyelonephritis    -  Piperacillin/Tazobactam: S <=16    -  Meropenem: S <=1    -  Levofloxacin: S <=2    Specimen Source: .Urine Catheterized    Culture Results:   >100,000 CFU/ml Escherichia coli          MEDICATIONS  (STANDING):  amLODIPine   Tablet 5 milliGRAM(s) Oral daily  docusate sodium 100 milliGRAM(s) Oral three times a day  heparin  Injectable 5000 Unit(s) SubCutaneous every 12 hours  morphine ER Tablet 15 milliGRAM(s) Oral two times a day  piperacillin/tazobactam IVPB. 3.375 Gram(s) IV Intermittent every 8 hours    MEDICATIONS  (PRN):  acetaminophen   Tablet .. 650 milliGRAM(s) Oral every 6 hours PRN Temp greater or equal to 38C (100.4F), Mild Pain (1 - 3)  acetaminophen   Tablet .. 650 milliGRAM(s) Oral every 6 hours PRN Mild Pain (1 - 3)  oxyCODONE    IR 5 milliGRAM(s) Oral every 4 hours PRN Moderate Pain (4 - 6)  senna 2 Tablet(s) Oral at bedtime PRN Constipation      RADIOLOGY & ADDITIONAL TESTS:  EXAM:  CT PELVIS ONLY                        PROCEDURE DATE:  03/12/2019      COMPARISON: None    FINDINGS: There is extensive soft tissue gas in the left scrotum. Gas   extends superiorly to the base of the penis. There is no gas in the right   scrotum. Small bilateral hydroceles are noted as described on recent   ultrasound. There is a left groin hernia containing nonobstructed sigmoid   colon. There is a radiodense linear foreign object within the sigmoid   colon located within the hernia.    Partially visualized mass surrounds the distal aorta. The visualized   aorta and iliac arteries are normal in caliber. Colonic diverticulosis   noted without diverticulitis. Moderate sized umbilical hernia contains   nonobstructed small intestine. There is diffuse anasarca. No acute bony   abnormality is identified.    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  Left groin hernia containing nonobstructed sigmoid colon.  Partially visualized soft tissue mass surrounding the aorta suspicious   for metastatic lymphadenopathy.      EXAM:  US DPLX UPR EXT VEINS LTD RT                        PROCEDURE DATE:  03/18/2019    FINDINGS:    The right internal jugular, subclavian, axillary, brachial, basilic and   cephalic veins are patent and compressible where applicable.   Doppler examination shows normal spontaneous and phasic flow.  IMPRESSION:   No evidence of right upper extremity deep venous thrombosis.         EXAM:  ECHO TTE WO CON COMP W DOP    PROCEDURE DATE:  03/18/2019       Summary   Normal appearing mitral valve structure.   Mild (1+) mitral regurgitation is present.   Fibrocalcific changes noted to the Aortic valve leaflets with preserved   leaflet excursion.   Normal appearing tricuspid valve structure.   Mild (1+) tricuspid valve regurgitation is present.   Pulmonic valve not well seen.   Trace pulmonic valvular regurgitation is present.   The left atrium is severely dilated.   Estimated left ventricular ejection fraction is 55-60 %.   The left ventricle is normal in size, wall thickness, wall motion and   contractility as seen in limited views.   Normal appearing right atrium.   Normal appearing right ventricle structure and function.   All visualized extra cardiac structures appears to be normal.   No evidence of pericardial effusion.

## 2019-03-20 NOTE — PROGRESS NOTE ADULT - ASSESSMENT
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 Bacteremia    POD#8     Plan:    -continue daily wet to dry dressing changes  -continue IV abx as per ID  -continue zhou catheter to gravity drainage  -plastic surgery note appreciated - plan for reconstructive surgery on 3/21      Thank you for allowing me to assist in the care of this patient  Please feel free to call with any questions/concerns    Fito Ventura MD  [facility]  W: [phone]  C:  [phone]

## 2019-03-21 DIAGNOSIS — R59.0 LOCALIZED ENLARGED LYMPH NODES: ICD-10-CM

## 2019-03-21 PROBLEM — M47.12 OTHER SPONDYLOSIS WITH MYELOPATHY, CERVICAL REGION: Chronic | Status: ACTIVE | Noted: 2019-03-11

## 2019-03-21 PROBLEM — D32.9 BENIGN NEOPLASM OF MENINGES, UNSPECIFIED: Chronic | Status: ACTIVE | Noted: 2019-03-11

## 2019-03-21 LAB
ANION GAP SERPL CALC-SCNC: 3 MMOL/L — LOW (ref 5–17)
APTT BLD: 30.2 SEC — SIGNIFICANT CHANGE UP (ref 27.5–36.3)
BLD GP AB SCN SERPL QL: SIGNIFICANT CHANGE UP
BUN SERPL-MCNC: 31 MG/DL — HIGH (ref 7–23)
CALCIUM SERPL-MCNC: 8.1 MG/DL — LOW (ref 8.5–10.1)
CHLORIDE SERPL-SCNC: 104 MMOL/L — SIGNIFICANT CHANGE UP (ref 96–108)
CO2 SERPL-SCNC: 29 MMOL/L — SIGNIFICANT CHANGE UP (ref 22–31)
CREAT SERPL-MCNC: 1.16 MG/DL — SIGNIFICANT CHANGE UP (ref 0.5–1.3)
GLUCOSE SERPL-MCNC: 96 MG/DL — SIGNIFICANT CHANGE UP (ref 70–99)
HCT VFR BLD CALC: 28.2 % — LOW (ref 39–50)
HGB BLD-MCNC: 9 G/DL — LOW (ref 13–17)
INR BLD: 1.18 RATIO — HIGH (ref 0.88–1.16)
MCHC RBC-ENTMCNC: 31.9 GM/DL — LOW (ref 32–36)
MCHC RBC-ENTMCNC: 32.5 PG — SIGNIFICANT CHANGE UP (ref 27–34)
MCV RBC AUTO: 101.8 FL — HIGH (ref 80–100)
NRBC # BLD: 0 /100 WBCS — SIGNIFICANT CHANGE UP (ref 0–0)
PLATELET # BLD AUTO: 340 K/UL — SIGNIFICANT CHANGE UP (ref 150–400)
POTASSIUM SERPL-MCNC: 4.4 MMOL/L — SIGNIFICANT CHANGE UP (ref 3.5–5.3)
POTASSIUM SERPL-SCNC: 4.4 MMOL/L — SIGNIFICANT CHANGE UP (ref 3.5–5.3)
PROTHROM AB SERPL-ACNC: 13.2 SEC — HIGH (ref 10–12.9)
RBC # BLD: 2.77 M/UL — LOW (ref 4.2–5.8)
RBC # FLD: 14.6 % — HIGH (ref 10.3–14.5)
SODIUM SERPL-SCNC: 136 MMOL/L — SIGNIFICANT CHANGE UP (ref 135–145)
T3FREE SERPL-MCNC: 1.81 PG/ML — SIGNIFICANT CHANGE UP (ref 1.8–4.6)
TYPE + AB SCN PNL BLD: SIGNIFICANT CHANGE UP
WBC # BLD: 8.73 K/UL — SIGNIFICANT CHANGE UP (ref 3.8–10.5)
WBC # FLD AUTO: 8.73 K/UL — SIGNIFICANT CHANGE UP (ref 3.8–10.5)

## 2019-03-21 PROCEDURE — 99223 1ST HOSP IP/OBS HIGH 75: CPT

## 2019-03-21 RX ORDER — ZINC OXIDE 200 MG/G
1 OINTMENT TOPICAL DAILY
Qty: 0 | Refills: 0 | Status: DISCONTINUED | OUTPATIENT
Start: 2019-03-21 | End: 2019-03-26

## 2019-03-21 RX ORDER — POLYETHYLENE GLYCOL 3350 17 G/17G
17 POWDER, FOR SOLUTION ORAL DAILY
Qty: 0 | Refills: 0 | Status: DISCONTINUED | OUTPATIENT
Start: 2019-03-21 | End: 2019-03-27

## 2019-03-21 RX ORDER — SENNA PLUS 8.6 MG/1
2 TABLET ORAL AT BEDTIME
Qty: 0 | Refills: 0 | Status: DISCONTINUED | OUTPATIENT
Start: 2019-03-21 | End: 2019-03-27

## 2019-03-21 RX ADMIN — PIPERACILLIN AND TAZOBACTAM 25 GRAM(S): 4; .5 INJECTION, POWDER, LYOPHILIZED, FOR SOLUTION INTRAVENOUS at 22:37

## 2019-03-21 RX ADMIN — PIPERACILLIN AND TAZOBACTAM 25 GRAM(S): 4; .5 INJECTION, POWDER, LYOPHILIZED, FOR SOLUTION INTRAVENOUS at 05:40

## 2019-03-21 RX ADMIN — Medication 10 MILLIGRAM(S): at 13:34

## 2019-03-21 RX ADMIN — MORPHINE SULFATE 15 MILLIGRAM(S): 50 CAPSULE, EXTENDED RELEASE ORAL at 05:41

## 2019-03-21 RX ADMIN — SENNA PLUS 2 TABLET(S): 8.6 TABLET ORAL at 22:38

## 2019-03-21 RX ADMIN — PIPERACILLIN AND TAZOBACTAM 25 GRAM(S): 4; .5 INJECTION, POWDER, LYOPHILIZED, FOR SOLUTION INTRAVENOUS at 13:34

## 2019-03-21 RX ADMIN — Medication 100 MILLIGRAM(S): at 22:38

## 2019-03-21 RX ADMIN — Medication 100 MILLIGRAM(S): at 05:40

## 2019-03-21 RX ADMIN — MORPHINE SULFATE 15 MILLIGRAM(S): 50 CAPSULE, EXTENDED RELEASE ORAL at 05:40

## 2019-03-21 RX ADMIN — Medication 100 MILLIGRAM(S): at 13:33

## 2019-03-21 RX ADMIN — HEPARIN SODIUM 5000 UNIT(S): 5000 INJECTION INTRAVENOUS; SUBCUTANEOUS at 18:36

## 2019-03-21 NOTE — PROGRESS NOTE ADULT - ASSESSMENT
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

## 2019-03-21 NOTE — CONSULT NOTE ADULT - SUBJECTIVE AND OBJECTIVE BOX
CHIEF COMPLAINT: Patient is a 89y old  Male who presents with a chief complaint of Recurrent Falls (20 Mar 2019 16:20)      HPI:  88 y/o M PMHx significant for hypertension, hyperlipidemia, meningioma, recurrent mechanical falls, and cervical myelopathy/radiculopathy presents to the ED for further evaluation of an unwitnessed fall last night. In the ED the patient was found to have notable skin tears to his right upper arm. The patient notes that the fall occurred upon ambulating to the bathroom and was unable to get up on his own. Labs => WBC 12.09, Hgb/Hct 11.1/32.8, LA 1.6, Alb 2.6, BUN/Cr 64/1.80, , TnI (-) x 3, Bands 17%. In the ED the patient received NS x 1.5L. (10 Mar 2019 23:00)    3/21-patient 88 yo male with falls-multiple.  blamed on dehydration/dementia.  Has scrotal gangrene with necrotizing fasciitis-throughout hospitalization, noted AFib with bradycardia.  Patient required metoprolol in past for afib rate control.  Now with 2.7 sec pauses-still up to 2.5 and significant bradycardia 9 days since last beta blocker.  Suspected no longer in his system.    PMHx: PAST MEDICAL & SURGICAL HISTORY:  Cervical spondylosis with myelopathy and radiculopathy  Meningioma  Hypercholesterolemia  Hypertension  History of cataract surgery: Bilateral        Soc Hx:       Allergies: Allergies    No Known Allergies    Intolerances          REVIEW OF SYSTEMS:    CONSTITUTIONAL: No weakness, fevers or chills  EYES/ENT: No visual changes;  No vertigo or throat pain   NECK: No pain or stiffness  RESPIRATORY: No cough, wheezing, hemoptysis; No shortness of breath  CARDIOVASCULAR: No chest pain or palpitations  GASTROINTESTINAL: No abdominal or epigastric pain. No nausea, vomiting, or hematemesis; No diarrhea or constipation. No melena or hematochezia.  GENITOURINARY: No dysuria, frequency or hematuria  NEUROLOGICAL: No numbness or weakness  SKIN: No itching, burning, rashes, or lesions   All other review of systems is negative unless indicated above    Vital Signs Last 24 Hrs  T(C): 36.5 (21 Mar 2019 04:48), Max: 36.8 (20 Mar 2019 10:57)  T(F): 97.7 (21 Mar 2019 04:48), Max: 98.3 (20 Mar 2019 10:57)  HR: 59 (21 Mar 2019 04:48) (59 - 60)  BP: 95/72 (21 Mar 2019 04:48) (95/72 - 98/51)  BP(mean): --  RR: 18 (21 Mar 2019 04:48) (18 - 18)  SpO2: 97% (21 Mar 2019 04:48) (96% - 97%)    I&O's Summary    20 Mar 2019 07:01  -  21 Mar 2019 07:00  --------------------------------------------------------  IN: 0 mL / OUT: 1600 mL / NET: -1600 mL        CAPILLARY BLOOD GLUCOSE          PHYSICAL EXAM:   Constitutional: NAD, awake and alert, well-developed  HEENT: PERR, EOMI, Normal Hearing, MMM  Neck: Soft and supple, No LAD, No JVD  Respiratory: Breath sounds are clear bilaterally, No wheezing, rales or rhonchi  Cardiovascular: S1 and S2, regular rate and rhythm, no Murmurs, gallops or rubs  Gastrointestinal: Bowel Sounds present, soft, nontender, nondistended, no guarding, no rebound  Extremities: No peripheral edema  Vascular: 2+ peripheral pulses  Neurological: A/O x 3, no focal deficits  Musculoskeletal: 5/5 strength b/l upper and lower extremities  Skin: No rashes    MEDICATIONS:  MEDICATIONS  (STANDING):  docusate sodium 100 milliGRAM(s) Oral three times a day  heparin  Injectable 5000 Unit(s) SubCutaneous every 12 hours  morphine ER Tablet 15 milliGRAM(s) Oral two times a day  piperacillin/tazobactam IVPB. 3.375 Gram(s) IV Intermittent every 8 hours      LABS: All Labs Reviewed:                        9.0    8.73  )-----------( 340      ( 21 Mar 2019 05:53 )             28.2     03-21    136  |  104  |  31<H>  ----------------------------<  96  4.4   |  29  |  1.16    Ca    8.1<L>      21 Mar 2019 05:53      PT/INR - ( 21 Mar 2019 05:53 )   PT: 13.2 sec;   INR: 1.18 ratio         PTT - ( 21 Mar 2019 05:53 )  PTT:30.2 sec        Blood Culture:   lipid profile       RADIOLOGY:  < from: US Duplex Venous Upper Ext Ltd, Right (03.18.19 @ 15:27) >      < end of copied text >    EKG:    Telemetry:    ECHO:  < from: Transthoracic Echocardiogram (03.18.19 @ 22:25) >   Normal appearing mitral valve structure.   Mild (1+) mitral regurgitation is present.   Fibrocalcific changes noted to the Aortic valve leaflets with preserved   leaflet excursion.   Normal appearing tricuspid valve structure.   Mild (1+) tricuspid valve regurgitation is present.   Pulmonic valve not well seen.   Trace pulmonic valvular regurgitation is present.   The left atrium is severely dilated.   Estimated left ventricular ejection fraction is 55-60 %.   The left ventricle is normal in size, wall thickness, wall motion and   contractility as seen in limited views.   Normal appearing right atrium.   Normal appearing right ventricle structure and function.   All visualized extra cardiac structures appears to be normal.   No evidence of pericardial effusion.    < end of copied text >

## 2019-03-21 NOTE — CONSULT NOTE ADULT - ASSESSMENT
88 y/o M PMHx significant for hypertension, hyperlipidemia, meningioma, recurrent mechanical falls, and cervical myelopathy/radiculopathy presents to the ED for further evaluation of an unwitnessed fall last night. In the ED the patient was found to have notable skin tears to his right upper arm. The patient notes that the fall occurred upon ambulating to the bathroom and was unable to get up on his own. Labs => WBC 12.09, Hgb/Hct 11.1/32.8, LA 1.6, Alb 2.6, BUN/Cr 64/1.80, , TnI (-) x 3, Bands 17%. In the ED the patient received NS x 1.5L. (10 Mar 2019 23:00) 88 y/o M PMHx significant for hypertension, hyperlipidemia, meningioma, recurrent mechanical falls, and cervical myelopathy/radiculopathy presents to the ED for further evaluation of an unwitnessed fall last night. In the ED the patient was found to have notable skin tears to his right upper arm. He has been hospitalized for more than 1 week, diagnosed with sepsis, and, on recent  CT, with partially visualized soft tissue mass surrounding the aorta, suspicious for metastatic lymphadenopathy, reason for oncology consult. No tissue available for review.

## 2019-03-21 NOTE — CONSULT NOTE ADULT - PROBLEM SELECTOR RECOMMENDATION 9
Unclear if reactive versus primary malignancy in periaortic lymph nodes. A PET/CT will help with the differential, but would consider scans once sepsis under control. Meanwhile, would favor obtaining biopsy for histologic diagnosis ( possible IR consultation). Continue present care.

## 2019-03-21 NOTE — PROGRESS NOTE ADULT - ASSESSMENT
89 y/o M PMHx significant for hypertension, hyperlipidemia, meningioma, recurrent mechanical falls, and cervical myelopathy/radiculopathy  admitted for:     1) S/p Fall  - mechanical vs syncope?   - found to be septic   - telemetry: AFIB with bradycardia  - EP eval appreciated:  no significant pauses, evelyn mostly at night  - TSH elevated, will check T3/T4: WNL, likely sick euthyroid vs subclinical, will need repeat labs in few weeks   - fall precautions  - PT   - D/w Dr Luna, plan for [facility] placement       2)  Sepsis with bacteroides in Blood,  Cellulitis with proteus/E coli/ Bacteroides, E.Coli UTI.  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, scrotum and penile shaft pod #7   improving   C/w local wound care as per Urology orders   C/w IV Zosyn, change to PO at d/c   C/w Jay   Discussed  with Plastic Sx, to OR on Wednesday id still inpt otherwise will arrange outPt   ID and urology  f/u appreciated        3) JAMIE on CKD, improved   -  pre-renal azotemia vs ATN  - S/p  IV hydration with Improvement of renal Fx  - Encourage oral hydration   - hold Furosemide and ACEI/ARB  - Monitor UO       4)HTN  BP borderline low   Hold meds:  was on  Amlodipine 10mg po daily  Lisinopril 40mg po daily   Metoprolol XL 100mg po daily      5) Severe Protein calorie Malnutrition. Hypoalbuminemia    encourage oral intake, supplements       6) Peripheral edema  likely due to IV hydration and hypoalbuminemia   Doppler neg for DVT  Elevate extremities  Edema improved       7) Elevated TSH  will check T3/T4: WNL  Repeat labs in few weeks     8) AFIB with slow ventricular response  New DX?   Metoprolol held   [id number] score: 5, high risk   CArdio eval appreciated, no recommendation for A/c     9) Soft tissue mass surrounding proximal aorta, suspicious for metastatic lymphadenopathy   incidental finding on CT   No h/o malignancy as per Pt, but not following with PCP   Hem/Onc eval appreciated, possible reactive due to sepsis? Recommended IR eval for possible Bx       10) DVT PPxs         Dispo: NPO after midnight for possible [facility]CRA

## 2019-03-21 NOTE — PROGRESS NOTE ADULT - SUBJECTIVE AND OBJECTIVE BOX
CC: Recurrent Falls (19 Mar 2019 17:27)    HPI:  88 y/o M PMHx significant for hypertension, hyperlipidemia, meningioma, recurrent mechanical falls, and cervical myelopathy/radiculopathy presents to the ED for further evaluation of an unwitnessed fall last night. In the ED the patient was found to have notable skin tears to his right upper arm. The patient notes that the fall occurred upon ambulating to the bathroom and was unable to get up on his own. Labs => WBC 12.09, Hgb/Hct 11.1/32.8, LA 1.6, Alb 2.6, BUN/Cr 64/1.80, , TnI (-) x 3, Bands 17%. In the ED the patient received NS x 1.5L. (10 Mar 2019 23:00)    INTERVAL HPI/ OVERNIGHT EVENTS:  Pt was seen and examined,  Sx postponed to next week. Pt reports no complains. Had dressing changed.  Pain controlled. POC discussed. MICRA placement planned by EP, Pt agrees     Vital Signs Last 24 Hrs  T(C): 36.7 (21 Mar 2019 16:38), Max: 36.7 (21 Mar 2019 16:38)  T(F): 98.1 (21 Mar 2019 16:38), Max: 98.1 (21 Mar 2019 16:38)  HR: 57 (21 Mar 2019 16:38) (57 - 59)  BP: 104/41 (21 Mar 2019 16:38) (95/72 - 111/59)-  RR: 18 (21 Mar 2019 11:20) (18 - 18)  SpO2: 99% (21 Mar 2019 16:38) (96% - 99%)      REVIEW OF SYSTEMS:  All other review of systems is negative unless indicated above.      PHYSICAL EXAM:  General: Well developed; well nourished; in no acute distress  Eyes: PERRLA, EOMI; conjunctiva and sclera clear  Head: Normocephalic; atraumatic  ENMT: No nasal discharge; airway clear  Neck: Supple; non tender; no masses  Respiratory: No wheezes, rales or rhonchi  Cardiovascular: Regular rate and rhythm. S1 and S2 Normal; No murmurs, gallops or rubs  Gastrointestinal: Soft non-tender non-distended; Normal bowel sounds. + umbilical hernia, reducible   Genitourinary: No suprapubic  tenderness. scrotal and posterior penile wound clean, + granulation tissues, dressing applied   Extremities: Normal range of motion, No edema  Vascular: Peripheral pulses palpable 2+ bilaterally  Neurological: Alert and oriented x4  Skin: Warm and dry. No acute rash  Lymph Nodes: No acute cervical adenopathy  Musculoskeletal: Normal muscle  tone, without deformities  Psychiatric: Cooperative and appropriate                LABS:                          9.0    8.73  )-----------( 340      ( 21 Mar 2019 05:53 )             28.2     03-21    136  |  104  |  31<H>  ----------------------------<  96  4.4   |  29  |  1.16    Ca    8.1<L>      21 Mar 2019 05:53    PT/INR - ( 21 Mar 2019 05:53 )   PT: 13.2 sec;   INR: 1.18 ratio      PTT - ( 21 Mar 2019 05:53 )  PTT:30.2 sec                 9.0    8.84  )-----------( 274      ( 18 Mar 2019 05:37 )             28.1     18 Mar 2019 05:37    137    |  107    |  31     ----------------------------<  106    4.5     |  26     |  1.03     Ca    7.9        18 Mar 2019 05:37    TPro  5.3    /  Alb  1.6    /  TBili  0.4    /  DBili  x      /  AST  22     /  ALT  24     /  AlkPhos  109    18 Mar 2019 05:37      LIVER FUNCTIONS - ( 18 Mar 2019 05:37 )  Alb: 1.6 g/dL / Pro: 5.3 gm/dL / ALK PHOS: 109 U/L / ALT: 24 U/L / AST: 22 U/L / GGT: x           Culture - Surgical Swab (03.12.19 @ 20:06)    Specimen Source: .Surgical Swab SCROTAL WOUND # 2    Culture Results:   Few Proteus mirabilis  Few Escherichia coli  See previous culture [id number]  Moderate Bacteroides fragilis "Susceptibilities not performed"    Culture - Surgical Swab (03.12.19 @ 20:06)    -  Imipenem: S <=1    -  Levofloxacin: I 4    -  Levofloxacin: S <=2    -  Meropenem: S <=1    -  Meropenem: S <=1    -  Piperacillin/Tazobactam: S <=16    -  Piperacillin/Tazobactam: S <=16    -  Tobramycin: S <=4    -  Tobramycin: S <=4    -  Trimethoprim/Sulfamethoxazole: S <=2/38    -  Trimethoprim/Sulfamethoxazole: S <=2/38    -  Amikacin: S <=16    -  Amikacin: S <=16    -  Ampicillin: S <=8 These ampicillin results predict results for amoxicillin    -  Ampicillin: S <=8 These ampicillin results predict results for amoxicillin    -  Ampicillin/Sulbactam: S <=8/4    -  Ampicillin/Sulbactam: S <=8/4    -  Aztreonam: R >16    -  Aztreonam: S <=4    -  Cefazolin: S <=8    -  Cefazolin: S <=8    -  Cefepime: S <=4    -  Cefepime: S <=4    -  Cefoxitin: S <=8    -  Cefoxitin: S <=8    -  Ceftriaxone: S <=1 Enterobacter, Citrobacter, and Serratia may develop resistance during prolonged therapy    -  Ceftriaxone: S <=1 Enterobacter, Citrobacter, and Serratia may develop resistance during prolonged therapy    -  Ciprofloxacin: S <=1    -  Ciprofloxacin: S <=1    -  Ertapenem: S <=1    -  Ertapenem: S <=1    -  Gentamicin: S <=4    -  Gentamicin: S <=4    Specimen Source: .Surgical Swab SCROTAL WOUND # 1    Culture Results:   Few Proteus mirabilis  Few Escherichia coli    Organism: Proteus mirabilis    Organism: Escherichia coli    Culture - Blood (03.10.19 @ 21:50)    -  Multidrug (KPC pos) resistant organism: Nondet    -  Staphylococcus aureus: Nondet    -  Methicillin resistant Staphylococcus aureus (MRSA): Nondet    -  Coagulase negative Staphylococcus: Nondet    -  Enterococcus species: Nondet    -  Vancomycin resistant Enterococcus sp.: Nondet    -  Escherichia coli: Nondet    -  Klebsiella oxytoca: Nondet    -  Klebsiella pneumoniae: Nondet    -  Serratia marcescens: Nondet    -  Haemophilus influenzae: Nondet    -  Listeria monocytogenes: Nondet    -  Neisseria meningitidis: Nondet    -  Pseudomonas aeruginosa: Nondet    -  Acinetobacter baumanii: Nondet    -  Enterobacter cloacae complex: Nondet    -  Streptococcus sp. (Not Grp A, B or S pneumoniae): Nondet    -  Streptococcus agalactiae (Group B): Nondet    -  Streptococcus pyogenes (Group A): Nondet    -  Streptococcus pneumoniae: Nondet    -  Candida albicans: Nondet    -  Candida glabrata: Nondet    -  Candida krusei: Nondet    -  Candida parapsilosis: Nondet    -  Candida tropicalis: Nondet    Gram Stain:   Growth in anaerobic bottle: Gram Negative Rods  Growth in anaerobic bottle: Bacteroides fragilis    Culture - Urine (03.10.19 @ 21:43)    -  Ampicillin: S <=8 These ampicillin results predict results for amoxicillin    -  Aztreonam: S <=4    -  Ampicillin/Sulbactam: S <=8/4    -  Amikacin: S <=16    -  Imipenem: S <=1    -  Gentamicin: S <=4    -  Ertapenem: S <=1    -  Ciprofloxacin: S <=1    -  Ceftriaxone: S <=1 Enterobacter, Citrobacter, and Serratia may develop resistance during prolonged therapy    -  Cefoxitin: S <=8    -  Cefepime: S <=4    -  Cefazolin: S <=8 For uncomplicated UTI with K. pneumoniae, E. coli, or P. mirablis: JANESSA <=16 is sensitive and JANESSA >=32 is resistant. This also predicts results for oral agents cefaclor, cefdinir, cefpodoxime, cefprozil, cefuroxime axetil, cephalexin and locarbef for uncomplicated UTI. Note that some isolates may be susceptible to these agents while testing resistant to cefazolin.    -  Trimethoprim/Sulfamethoxazole: S <=2/38    -  Tigecycline: S <=2    -  Tobramycin: S <=4    -  Nitrofurantoin: S <=32 Should not be used to treat pyelonephritis    -  Piperacillin/Tazobactam: S <=16    -  Meropenem: S <=1    -  Levofloxacin: S <=2    Specimen Source: .Urine Catheterized    Culture Results:   >100,000 CFU/ml Escherichia coli          MEDICATIONS  (STANDING):  amLODIPine   Tablet 5 milliGRAM(s) Oral daily  docusate sodium 100 milliGRAM(s) Oral three times a day  heparin  Injectable 5000 Unit(s) SubCutaneous every 12 hours  morphine ER Tablet 15 milliGRAM(s) Oral two times a day  piperacillin/tazobactam IVPB. 3.375 Gram(s) IV Intermittent every 8 hours    MEDICATIONS  (PRN):  acetaminophen   Tablet .. 650 milliGRAM(s) Oral every 6 hours PRN Temp greater or equal to 38C (100.4F), Mild Pain (1 - 3)  acetaminophen   Tablet .. 650 milliGRAM(s) Oral every 6 hours PRN Mild Pain (1 - 3)  oxyCODONE    IR 5 milliGRAM(s) Oral every 4 hours PRN Moderate Pain (4 - 6)  senna 2 Tablet(s) Oral at bedtime PRN Constipation      RADIOLOGY & ADDITIONAL TESTS:  EXAM:  CT PELVIS ONLY                        PROCEDURE DATE:  03/12/2019      COMPARISON: None    FINDINGS: There is extensive soft tissue gas in the left scrotum. Gas   extends superiorly to the base of the penis. There is no gas in the right   scrotum. Small bilateral hydroceles are noted as described on recent   ultrasound. There is a left groin hernia containing nonobstructed sigmoid   colon. There is a radiodense linear foreign object within the sigmoid   colon located within the hernia.    Partially visualized mass surrounds the distal aorta. The visualized   aorta and iliac arteries are normal in caliber. Colonic diverticulosis   noted without diverticulitis. Moderate sized umbilical hernia contains   nonobstructed small intestine. There is diffuse anasarca. No acute bony   abnormality is identified.    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  Left groin hernia containing nonobstructed sigmoid colon.  Partially visualized soft tissue mass surrounding the aorta suspicious   for metastatic lymphadenopathy.      EXAM:  US DPLX UPR EXT VEINS LTD RT                        PROCEDURE DATE:  03/18/2019    FINDINGS:    The right internal jugular, subclavian, axillary, brachial, basilic and   cephalic veins are patent and compressible where applicable.   Doppler examination shows normal spontaneous and phasic flow.  IMPRESSION:   No evidence of right upper extremity deep venous thrombosis.         EXAM:  ECHO TTE WO CON COMP W DOP    PROCEDURE DATE:  03/18/2019       Summary   Normal appearing mitral valve structure.   Mild (1+) mitral regurgitation is present.   Fibrocalcific changes noted to the Aortic valve leaflets with preserved   leaflet excursion.   Normal appearing tricuspid valve structure.   Mild (1+) tricuspid valve regurgitation is present.   Pulmonic valve not well seen.   Trace pulmonic valvular regurgitation is present.   The left atrium is severely dilated.   Estimated left ventricular ejection fraction is 55-60 %.   The left ventricle is normal in size, wall thickness, wall motion and   contractility as seen in limited views.   Normal appearing right atrium.   Normal appearing right ventricle structure and function.   All visualized extra cardiac structures appears to be normal.   No evidence of pericardial effusion.

## 2019-03-21 NOTE — CONSULT NOTE ADULT - ASSESSMENT
patient with multiple falls, multiple possible etiologies with significant bradycardia (even awake) with significant pauses even 9 days post beta blocker therapy.    1-Patient is ambulatory and will most likey fall again-minimize cardiac risk with micra PPM and reinitiate beta blocker therapy, once infection cleared and no concern for procedure  2-there appears to be no concern for endocarditis, with normal cardiac function and no significant valve disease.    3-no active evidence of CAD or CHF  Will f/u prn as needed

## 2019-03-21 NOTE — CONSULT NOTE ADULT - SUBJECTIVE AND OBJECTIVE BOX
SAMMI SANCHEZ,  89y Male  MRN: [id number]  ATTENDING: Dr.Aldo Bowen      HPI:  89 y/o M PMHx significant for hypertension, hyperlipidemia, meningioma, recurrent mechanical falls, and cervical myelopathy/radiculopathy presents to the ED for further evaluation of an unwitnessed fall last night. In the ED the patient was found to have notable skin tears to his right upper arm. The patient notes that the fall occurred upon ambulating to the bathroom and was unable to get up on his own. Labs => WBC 12.09, Hgb/Hct 11.1/32.8, LA 1.6, Alb 2.6, BUN/Cr 64/1.80, , TnI (-) x 3, Bands 17%. In the ED the patient received NS x 1.5L. (10 Mar 2019 23:00)      PAST MEDICAL & SURGICAL HISTORY:  Cervical spondylosis with myelopathy and radiculopathy  Meningioma  Hypercholesterolemia  Hypertension  History of cataract surgery: Bilateral    MEDICATION:  docusate sodium 100 milliGRAM(s) Oral three times a day  heparin  Injectable 5000 Unit(s) SubCutaneous every 12 hours  morphine ER Tablet 15 milliGRAM(s) Oral two times a day  piperacillin/tazobactam IVPB. 3.375 Gram(s) IV Intermittent every 8 hours    ALLERGIES:  No Known Allergies    FAMILY HISTORY:  Reviewed, non-contributory: [ ]   Maternal-  Paternal-    SOCIAL HISTORY:  Tobacco: YES [ ]  ; NO [ ]; Former smoker [ ]  Alcohol:   YES [ ]  ; NO [ ]; Social alcohol user [ ]  Occupation/ marital status/ children:    REVIEW SYSTEMS:  Constitutional: fever, weight  HEENT: oral thrush / dysphagia  Respiratory: no dyspnea , wheezing, cough  Cardiovascular: denies chest pain, palpitations  GI: no abdominal tenderness / pain; no change in bowel habits  Musculoskeletal: joint pain / swelling  Integumentary: denies pruritus; no skin lesions  Neurologic:    VITALS:  T(C): 36.5, Max: 36.8 (03-20-19 @ 10:57)  T(F): 97.7, Max: 98.3 (03-20-19 @ 10:57)  HR: 59 (59 - 60)  BP: 95/72 (95/72 - 98/51)  SpO2: 97% (96% - 97%)    PHYSICAL EXAM:  General: Well developed; well nourished; in no acute distress  Eyes: PERRLA, EOMI; conjunctiva and sclera clear  Head: Normocephalic; atraumatic  ENMT: No nasal discharge; airway clear  Neck: Supple; non tender; no masses  Respiratory: No wheezes, rales or rhonchi  Cardiovascular: Regular rate and rhythm. S1 and S2 Normal; No murmurs, gallops or rubs  Gastrointestinal: Soft non-tender non-distended; Normal bowel sounds. + umbilical hernia, reducible   Genitourinary: No suprapubic  tenderness  Extremities: Normal range of motion, No clubbing, cyanosis or edema  Vascular: Peripheral pulses palpable 2+ bilaterally  Neurological: Alert and oriented x4  Skin: Warm and dry. No acute rash  Lymph Nodes: No acute cervical adenopathy  Musculoskeletal: Normal gait, tone, without deformities  Psychiatric: Cooperative and appropriate    LABS:  (03-21) WBC: 8.73 K/uL,Hemoglobin: 9.0 g/dL, Hematocrit: 28.2 %,  Platelet: 340 K/uL  (03-21) Na: 136 mmol/L ; K: 4.4 mmol/L ; BUN: 31 mg/dL ; Cr: 1.16 mg/dL.  PT/INR - ( 21 Mar 2019 05:53 )   PT: 13.2 sec;   INR: 1.18 ratio    PTT - ( 21 Mar 2019 05:53 )  PTT:30.2 sec    RADIOLOGY:  IMPRESSION:  Extensive soft tissue gas in the left scrotum suspicious for Teetee's   gangrene.  Left groin hernia containing nonobstructed sigmoid colon.  Partially visualized soft tissue mass surrounding the aorta suspicious   for metastatic lymphadenopathy.      EXAM:  US DPLX UPR EXT VEINS LTD RT                        PROCEDURE DATE:  03/18/2019    FINDINGS:    The right internal jugular, subclavian, axillary, brachial, basilic and   cephalic veins are patent and compressible where applicable.   Doppler examination shows normal spontaneous and phasic flow.  IMPRESSION:   No evidence of right upper extremity deep venous thrombosis. SAMMI SANCHEZ,  89y Male  MRN: [id number]  ATTENDING: Dr.Aldo Bowen      HPI:  88 y/o M PMHx significant for hypertension, hyperlipidemia, meningioma, recurrent mechanical falls, and cervical myelopathy/radiculopathy presents to the ED for further evaluation of an unwitnessed fall last night. In the ED the patient was found to have notable skin tears to his right upper arm. He has been hospitalized for more than 1 week, diagnosed with sepsis, and, on recent  CT, with partially visualized soft tissue mass surrounding the aorta, suspicious for metastatic lymphadenopathy, reason for oncology consult. No tissue available for review.    PAST MEDICAL & SURGICAL HISTORY:  Cervical spondylosis with myelopathy and radiculopathy  Meningioma  Hypercholesterolemia  Hypertension  History of cataract surgery: Bilateral    MEDICATION:  docusate sodium 100 milliGRAM(s) Oral three times a day  heparin  Injectable 5000 Unit(s) SubCutaneous every 12 hours  morphine ER Tablet 15 milliGRAM(s) Oral two times a day  piperacillin/tazobactam IVPB. 3.375 Gram(s) IV Intermittent every 8 hours    ALLERGIES:  No Known Allergies    FAMILY HISTORY:  Reviewed, non-contributory: [ ]   Maternal-  Paternal-    SOCIAL HISTORY:  Tobacco: YES [ ]  ; NO [ ]; Former smoker [ ]  Alcohol:   YES [ ]  ; NO [ ]; Social alcohol user [ ]  Occupation/ marital status/ children:    REVIEW SYSTEMS:  Constitutional: fever, weight  HEENT: oral thrush / dysphagia  Respiratory: no dyspnea , wheezing, cough  Cardiovascular: denies chest pain, palpitations  GI: no abdominal tenderness / pain; no change in bowel habits  Musculoskeletal: joint pain / swelling  Integumentary: denies pruritus; no skin lesions  Neurologic:    VITALS:  T(C): 36.5, Max: 36.8 (03-20-19 @ 10:57)  T(F): 97.7, Max: 98.3 (03-20-19 @ 10:57)  HR: 59 (59 - 60)  BP: 95/72 (95/72 - 98/51)  SpO2: 97% (96% - 97%)    PHYSICAL EXAM:  General: Well developed; well nourished; in no acute distress  Eyes: PERRLA, EOMI; conjunctiva and sclera clear  Head: Normocephalic; atraumatic  ENMT: No nasal discharge; airway clear  Neck: Supple; non tender; no masses  Respiratory: No wheezes, rales or rhonchi  Cardiovascular: Regular rate and rhythm. S1 and S2 Normal; No murmurs, gallops or rubs  Gastrointestinal: Soft non-tender non-distended; Normal bowel sounds. + umbilical hernia, reducible   Genitourinary: No suprapubic  tenderness  Extremities: Normal range of motion, No clubbing, cyanosis or edema  Vascular: Peripheral pulses palpable 2+ bilaterally  Neurological: Alert and oriented x4  Skin: Warm and dry. No acute rash  Lymph Nodes: No acute cervical adenopathy  Musculoskeletal: Normal gait, tone, without deformities  Psychiatric: Cooperative and appropriate    LABS:  (03-21) WBC: 8.73 K/uL,Hemoglobin: 9.0 g/dL, Hematocrit: 28.2 %,  Platelet: 340 K/uL  (03-21) Na: 136 mmol/L ; K: 4.4 mmol/L ; BUN: 31 mg/dL ; Cr: 1.16 mg/dL.  PT/INR - ( 21 Mar 2019 05:53 )   PT: 13.2 sec;   INR: 1.18 ratio    PTT - ( 21 Mar 2019 05:53 )  PTT:30.2 sec    RADIOLOGY:  IMPRESSION:  Extensive soft tissue gas in the left scrotum suspicious for Teetee's   gangrene.  Left groin hernia containing nonobstructed sigmoid colon.  Partially visualized soft tissue mass surrounding the aorta suspicious   for metastatic lymphadenopathy.      EXAM:  US DPLX UPR EXT VEINS LTD RT                        PROCEDURE DATE:  03/18/2019    FINDINGS:    The right internal jugular, subclavian, axillary, brachial, basilic and   cephalic veins are patent and compressible where applicable.   Doppler examination shows normal spontaneous and phasic flow.  IMPRESSION:   No evidence of right upper extremity deep venous thrombosis.

## 2019-03-21 NOTE — PROGRESS NOTE ADULT - SUBJECTIVE AND OBJECTIVE BOX
: 89yMale who was admitted after a traumatic fall which was unwitnessed      TELE:  SB 30 BPM during sleep.    MEDICATIONS  (STANDING):  docusate sodium 100 milliGRAM(s) Oral three times a day  heparin  Injectable 5000 Unit(s) SubCutaneous every 12 hours  morphine ER Tablet 15 milliGRAM(s) Oral two times a day  piperacillin/tazobactam IVPB. 3.375 Gram(s) IV Intermittent every 8 hours  senna 2 Tablet(s) Oral at bedtime    MEDICATIONS  (PRN):  acetaminophen   Tablet .. 650 milliGRAM(s) Oral every 6 hours PRN Temp greater or equal to 38C (100.4F), Mild Pain (1 - 3)  acetaminophen   Tablet .. 650 milliGRAM(s) Oral every 6 hours PRN Mild Pain (1 - 3)  bisacodyl Suppository 10 milliGRAM(s) Rectal daily PRN Constipation  oxyCODONE    IR 5 milliGRAM(s) Oral every 4 hours PRN Moderate Pain (4 - 6)  polyethylene glycol 3350 17 Gram(s) Oral daily PRN Constipation      Allergies    No Known Allergies    Intolerances        Vital Signs Last 24 Hrs  T(C): 36.3 (21 Mar 2019 11:20), Max: 36.5 (21 Mar 2019 04:48)  T(F): 97.3 (21 Mar 2019 11:20), Max: 97.7 (21 Mar 2019 04:48)  HR: 58 (21 Mar 2019 11:20) (58 - 59)  BP: 111/59 (21 Mar 2019 11:20) (95/72 - 111/59)  BP(mean): --  RR: 18 (21 Mar 2019 11:20) (18 - 18)  SpO2: 96% (21 Mar 2019 11:20) (96% - 97%)    PHYSICAL EXAMINATION:    GENERAL APPEARANCE:  Pt. is not currently dyspneic, in no distress. Pt. is alert, oriented, and pleasant.  HEENT:  Pupils are normal and react normally. No icterus. Mucous membranes well colored.  NECK:  Supple. No lymphadenopathy. Jugular venous pressure not elevated. Carotids equal.   HEART:   The cardiac impulse has a normal quality. There are no murmurs, rubs or gallops noted  CHEST:  Chest is clear to auscultation. Normal respiratory effort.  ABDOMEN:  Soft and nontender.   EXTREMITIES:  There is no edema.   SKIN:  No rash or significant lesions are noted.    LABS:                        9.0    8.73  )-----------( 340      ( 21 Mar 2019 05:53 )             28.2     03-21    136  |  104  |  31<H>  ----------------------------<  96  4.4   |  29  |  1.16    Ca    8.1<L>      21 Mar 2019 05:53      PT/INR - ( 21 Mar 2019 05:53 )   PT: 13.2 sec;   INR: 1.18 ratio         PTT - ( 21 Mar 2019 05:53 )  PTT:30.2 sec          RADIOLOGY & ADDITIONAL TESTS:

## 2019-03-21 NOTE — PROGRESS NOTE ADULT - SUBJECTIVE AND OBJECTIVE BOX
Patient seen and examined at bedside  No acute events overnight  Feels well  No fevers, chills, nausea, vomiting  Plastics reconstruction postponed until next week      Medications  acetaminophen   Tablet .. 650 milliGRAM(s) Oral every 6 hours PRN  acetaminophen   Tablet .. 650 milliGRAM(s) Oral every 6 hours PRN  bisacodyl Suppository 10 milliGRAM(s) Rectal daily PRN  docusate sodium 100 milliGRAM(s) Oral three times a day  morphine ER Tablet 15 milliGRAM(s) Oral two times a day  oxyCODONE    IR 5 milliGRAM(s) Oral every 4 hours PRN  piperacillin/tazobactam IVPB. 3.375 Gram(s) IV Intermittent every 8 hours  polyethylene glycol 3350 17 Gram(s) Oral daily PRN  senna 2 Tablet(s) Oral at bedtime  zinc oxide 40% Ointment 1 Application(s) Topical daily      ROS  General: No weight change, fevers, chills  Ophthalmologic: No eye pain,  swelling,  ENMT: No hearing changes,  ear pain  Respiratory and Thorax: No cough, sputum, dyspnea, wheezing  Cardiovascular: No chest pain, SOB  Gastrointestinal:	No diarrhea, constipation, nausea, vomiting,  Genitourinary: see above  Neurological: No syncope, loss of consciousness, headache, dizziness  Psychiatric: No SI/HI/AH/VH.    Vital Signs Last 24 Hrs  T(C): 36.7 (21 Mar 2019 16:38), Max: 36.7 (21 Mar 2019 16:38)  T(F): 98.1 (21 Mar 2019 16:38), Max: 98.1 (21 Mar 2019 16:38)  HR: 57 (21 Mar 2019 16:38) (57 - 59)  BP: 104/41 (21 Mar 2019 16:38) (95/72 - 111/59)  BP(mean): --  RR: 18 (21 Mar 2019 11:20) (18 - 18)  SpO2: 99% (21 Mar 2019 16:38) (96% - 99%)    PHYSICAL EXAM:  Constitutional: elderly male, alert and cooperative, NAD, lying in bed comfortably   Eyes: EOMI  Back: no CVA tenderness bilaterally  Respiratory: no labored breathing  Cardiovascular: no cyanosis, or pallor. Extremities are warm and well perfused.   Gastrointestinal: soft, non-tender, non-distended, no guarding, no rebound tenderness. Bladder non-palpable  Genitourinary: zhou in place - draining yellow urine. b/l testicles exposed but appear viable. No purulence noted. No crepitus.      I/O    03-20-19 @ 07:01  -  03-21-19 @ 07:00  --------------------------------------------------------  IN: 0 mL / OUT: 1600 mL / NET: -1600 mL        Labs:  CBC Full  -  ( 21 Mar 2019 05:53 )  WBC Count : 8.73 K/uL  Hemoglobin : 9.0 g/dL  Hematocrit : 28.2 %  Platelet Count - Automated : 340 K/uL  Mean Cell Volume : 101.8 fl  Mean Cell Hemoglobin : 32.5 pg  Mean Cell Hemoglobin Concentration : 31.9 gm/dL  Auto Neutrophil # : x  Auto Lymphocyte # : x  Auto Monocyte # : x  Auto Eosinophil # : x  Auto Basophil # : x  Auto Neutrophil % : x  Auto Lymphocyte % : x  Auto Monocyte % : x  Auto Eosinophil % : x  Auto Basophil % : x    03-21    136  |  104  |  31<H>  ----------------------------<  96  4.4   |  29  |  1.16    Ca    8.1<L>      21 Mar 2019 05:53

## 2019-03-21 NOTE — CONSULT NOTE ADULT - CONSULT REQUESTED DATE/TIME
21-Mar-2019
21-Mar-2019 07:03
12-Mar-2019
12-Mar-2019
13-Mar-2019 10:11
18-Mar-2019 14:35
15-Mar-2019 13:34

## 2019-03-21 NOTE — PROGRESS NOTE ADULT - ASSESSMENT
89 y/o Male with h/o hypertension, hyperlipidemia, meningioma, recurrent mechanical falls, cervical myelopathy/radiculopathy was admitted on 3/12 for increased weakness and unwitnessed fall. In the ED the patient was found to have skin tears to his right upper arm. The patient notes that the fall occurred upon ambulating to the bathroom and was unable to get up on his own. In the ED the patient received zosyn and vancomycin IV.    1. 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 S/p sepsis with bacteroides fragilis. CRF stage 3.   -improved  -wound cultures reviewed  -on zosyn 3.375 gm IV q8h # 9  -tolerating abx well so far; no side effects noted  -renal function is improved  -urology evaluation appreciated  -plastics reconstruction surgery was postponed   -local wound care  -continue abx coverage; may change to oral abx coverage if ready for discharge; plastics surgery may be performed at a later date   -monitor temps  -f/u CBC  -supportive care  2. Other issues:   -care per medicine

## 2019-03-21 NOTE — PROGRESS NOTE ADULT - ASSESSMENT
ASSESSMENT & PLAN: 88 y/o male with history of multiple falls, newly discovered AT [facility], was on a BB prior to admission (last dose 3/11/2019) .  He had a scrotal infection which is no longer infected.  he continues to have significant bradycardia and is being scheduled for future reconstructive surgery.  Plan:  Pt to have MICTA Implant tomorrow  Keep Pt NPO after midnight  Consider OAC verus NOAC psot surgery (but pt has had falls is the past  Hold Hepain after ! am on3/22/2019 ASSESSMENT & PLAN: 88 y/o male with history of multiple falls, newly discovered AT [facility], was on a BB prior to admission (last dose 3/11/2019) .  He had a scrotal ceelulits which .  He  continues to have significant bradycardia and is being scheduled for future reconstructive surgery.  Dr. Devi has been following this pt, who stated this pt is no longer bacteremix.  Plan:  Pt to have MICRA Implant tomorrow  Keep Pt NPO after midnight  Consider OAC verus NOAC post surgery (but pt has had falls is the past  Hold Heparin after ! am on3/22/2019

## 2019-03-21 NOTE — PROGRESS NOTE ADULT - SUBJECTIVE AND OBJECTIVE BOX
Date of service: 03-21-19 @ 11:54    Lying in bed in NAD  Alert and verbal  Denies pain    ROS: no fever or chills; denies dizziness, no HA, no SOB or cough, no abdominal pain, no diarrhea or constipation; no dysuria, no legs pain, no new rashes    MEDICATIONS  (STANDING):  docusate sodium 100 milliGRAM(s) Oral three times a day  heparin  Injectable 5000 Unit(s) SubCutaneous every 12 hours  morphine ER Tablet 15 milliGRAM(s) Oral two times a day  piperacillin/tazobactam IVPB. 3.375 Gram(s) IV Intermittent every 8 hours  senna 2 Tablet(s) Oral at bedtime      Vital Signs Last 24 Hrs  T(C): 36.3 (21 Mar 2019 11:20), Max: 36.5 (21 Mar 2019 04:48)  T(F): 97.3 (21 Mar 2019 11:20), Max: 97.7 (21 Mar 2019 04:48)  HR: 58 (21 Mar 2019 11:20) (58 - 59)  BP: 111/59 (21 Mar 2019 11:20) (95/72 - 111/59)  BP(mean): --  RR: 18 (21 Mar 2019 11:20) (18 - 18)  SpO2: 96% (21 Mar 2019 11:20) (96% - 97%)    Physical Exam:      Constitutional: frail looking  HEENT: NC/AT, EOMI, PERRLA, conjunctivae clear  Neck: supple; thyroid not palpable  Back: no tenderness  Respiratory: respiratory effort normal; decreased BS at bases  Cardiovascular: S1S2 regular, no murmurs  Abdomen: soft, not tender, not distended, positive BS  Genitourinary: no suprapubic tenderness; scrotal edema and erythema and open wound s/p surgery - clean  Lymphatic: no LN palpable  Musculoskeletal: no muscle tenderness, no joint swelling or tenderness  Extremities: no pedal edema  Neurological/ Psychiatric: AxOx3, moving all extremities  Skin: no rashes; no palpable lesions    Labs: reviewed                        9.0    8.73  )-----------( 340      ( 21 Mar 2019 05:53 )             28.2     03-21    136  |  104  |  31<H>  ----------------------------<  96  4.4   |  29  |  1.16    Ca    8.1<L>      21 Mar 2019 05:53               9.7    9.38  )-----------( 166      ( 14 Mar 2019 05:57 )             30.0     03-14    141  |  111<H>  |  53<H>  ----------------------------<  141<H>  4.1   |  24  |  1.28    Ca    8.2<L>      14 Mar 2019 05:57    TPro  5.2<L>  /  Alb  1.7<L>  /  TBili  0.6  /  DBili  x   /  AST  33  /  ALT  25  /  AlkPhos  102  03-14    Vancomycin Level, Trough: 9.5 ug/mL (03-14 @ 05:57)      03-12    143  |  111<H>  |  64<H>  ----------------------------<  141<H>  3.7   |  22  |  1.76<H>    Culture - Surgical Swab (03.12.19 @ 20:06)    -  Levofloxacin: I 4    -  Levofloxacin: S <=2    -  Tobramycin: S <=4    -  Tobramycin: S <=4    -  Trimethoprim/Sulfamethoxazole: S <=2/38    -  Trimethoprim/Sulfamethoxazole: S <=2/38    -  Amikacin: S <=16    -  Amikacin: S <=16    -  Ampicillin: S <=8 These ampicillin results predict results for amoxicillin    -  Ampicillin: S <=8 These ampicillin results predict results for amoxicillin    -  Ampicillin/Sulbactam: S <=8/4    -  Ampicillin/Sulbactam: S <=8/4    -  Aztreonam: R >16    -  Aztreonam: S <=4    -  Cefazolin: S <=8    -  Cefazolin: S <=8    -  Cefepime: S <=4    -  Cefepime: S <=4    -  Cefoxitin: S <=8    -  Cefoxitin: S <=8    -  Ceftriaxone: S <=1 Enterobacter, Citrobacter, and Serratia may develop resistance during prolonged therapy    -  Ceftriaxone: S <=1 Enterobacter, Citrobacter, and Serratia may develop resistance during prolonged therapy    -  Ciprofloxacin: S <=1    -  Ciprofloxacin: S <=1    -  Ertapenem: S <=1    -  Ertapenem: S <=1    -  Gentamicin: S <=4    -  Gentamicin: S <=4    -  Imipenem: S <=1    -  Meropenem: S <=1    -  Meropenem: S <=1    -  Piperacillin/Tazobactam: S <=16    -  Piperacillin/Tazobactam: S <=16    Specimen Source: .Surgical Swab SCROTAL WOUND # 1    Culture Results:   Few Proteus mirabilis  Few Escherichia coli  Moderate Bacteroides thetaiotaomicron "Susceptibilities not performed"    Organism Identification: Proteus mirabilis  Escherichia coli    Organism: Proteus mirabilis    Organism: Escherichia coli    Method Type: JANESSA    Method Type: JANESSA      Ca    8.2<L>      12 Mar 2019 06:35        Culture - Blood (collected 10 Mar 2019 21:50)  Source: .Blood None  Preliminary Report (12 Mar 2019 01:02):    No growth to date.    Culture - Blood (collected 10 Mar 2019 21:50)  Source: .Blood None  Gram Stain (12 Mar 2019 17:26):    Growth in anaerobic bottle: Gram Negative Rods  Final Report (13 Mar 2019 19:21):    Growth in anaerobic bottle: Bacteroides fragilis    "Susceptibilities not performed"      Culture - Urine (collected 10 Mar 2019 21:43)  Source: .Urine Catheterized  Final Report (12 Mar 2019 19:14):    >100,000 CFU/ml Escherichia coli  Organism: Escherichia coli (12 Mar 2019 19:14)  Organism: Escherichia coli (12 Mar 2019 19:14)      -  Amikacin: S <=16      -  Ampicillin: S <=8 These ampicillin results predict results for amoxicillin      -  Ampicillin/Sulbactam: S <=8/4      -  Aztreonam: S <=4      -  Cefazolin: S <=8 For uncomplicated UTI with K. pneumoniae, E. coli, or P. mirablis: JANESSA <=16 is sensitive and JANESSA >=32 is resistant. This also predicts results for oral agents cefaclor, cefdinir, cefpodoxime, cefprozil, cefuroxime axetil, cephalexin and locarbef for uncomplicated UTI. Note that some isolates may be susceptible to these agents while testing resistant to cefazolin.      -  Cefepime: S <=4      -  Cefoxitin: S <=8      -  Ceftriaxone: S <=1 Enterobacter, Citrobacter, and Serratia may develop resistance during prolonged therapy      -  Ciprofloxacin: S <=1      -  Ertapenem: S <=1      -  Gentamicin: S <=4      -  Imipenem: S <=1      -  Levofloxacin: S <=2      -  Meropenem: S <=1      -  Nitrofurantoin: S <=32 Should not be used to treat pyelonephritis      -  Piperacillin/Tazobactam: S <=16      -  Tigecycline: S <=2      -  Tobramycin: S <=4      -  Trimethoprim/Sulfamethoxazole: S <=2/38      Method Type: JANESSA        Radiology: all available radiological tests reviewed    < from: CT Pelvis No Cont (03.12.19 @ 13:36) >  Extensive soft tissue gas in the left scrotum suspicious for Teetee's   gangrene.  Left groin hernia containing nonobstructed sigmoid colon.  Partially visualized soft tissue mass surrounding the aorta suspicious   for metastatic lymphadenopathy.    < end of copied text >    < from: US Testicles (03.12.19 @ 09:46) >  Diffuse scrotal wall emphysema suspicious for gas-forming infection.  Small complex left hydrocele.  Unremarkable testes.    < end of copied text >      Advanced directives addressed: full resuscitation

## 2019-03-21 NOTE — CONSULT NOTE ADULT - CONSULT REASON
falls/arrhythmia
periaortic LAD
Bradycardia
Scrotal infection , [facility]
bacteremia
scrotal hematoma
perineal wounds

## 2019-03-22 PROCEDURE — 33274 TCAT INSJ/RPL PERM LDLS PM: CPT

## 2019-03-22 PROCEDURE — 71045 X-RAY EXAM CHEST 1 VIEW: CPT | Mod: 26

## 2019-03-22 RX ORDER — VANCOMYCIN HCL 1 G
1000 VIAL (EA) INTRAVENOUS ONCE
Qty: 0 | Refills: 0 | Status: COMPLETED | OUTPATIENT
Start: 2019-03-22 | End: 2019-03-22

## 2019-03-22 RX ADMIN — Medication 250 MILLIGRAM(S): at 12:22

## 2019-03-22 RX ADMIN — Medication 1 TABLET(S): at 17:10

## 2019-03-22 RX ADMIN — PIPERACILLIN AND TAZOBACTAM 25 GRAM(S): 4; .5 INJECTION, POWDER, LYOPHILIZED, FOR SOLUTION INTRAVENOUS at 05:12

## 2019-03-22 RX ADMIN — Medication 100 MILLIGRAM(S): at 21:30

## 2019-03-22 RX ADMIN — MORPHINE SULFATE 15 MILLIGRAM(S): 50 CAPSULE, EXTENDED RELEASE ORAL at 17:10

## 2019-03-22 RX ADMIN — SENNA PLUS 2 TABLET(S): 8.6 TABLET ORAL at 21:30

## 2019-03-22 RX ADMIN — ZINC OXIDE 1 APPLICATION(S): 200 OINTMENT TOPICAL at 16:13

## 2019-03-22 NOTE — PROGRESS NOTE ADULT - SUBJECTIVE AND OBJECTIVE BOX
Date of service: 03-22-19 @ 11:01    Lying in bed in NAD  Weak looking  Plan for micra placement    ROS: no fever or chills; denies dizziness, no HA, no SOB or cough, no abdominal pain, no diarrhea or constipation; no dysuria, no legs pain, no rashes    MEDICATIONS  (STANDING):  docusate sodium 100 milliGRAM(s) Oral three times a day  morphine ER Tablet 15 milliGRAM(s) Oral two times a day  piperacillin/tazobactam IVPB. 3.375 Gram(s) IV Intermittent every 8 hours  senna 2 Tablet(s) Oral at bedtime  zinc oxide 40% Ointment 1 Application(s) Topical daily      Vital Signs Last 24 Hrs  T(C): 36.3 (22 Mar 2019 04:27), Max: 36.7 (21 Mar 2019 16:38)  T(F): 97.4 (22 Mar 2019 04:27), Max: 98.1 (21 Mar 2019 16:38)  HR: 56 (22 Mar 2019 04:27) (56 - 58)  BP: 98/65 (22 Mar 2019 04:27) (98/65 - 111/59)  BP(mean): --  RR: 19 (22 Mar 2019 04:27) (18 - 19)  SpO2: 99% (22 Mar 2019 04:27) (96% - 99%)    Physical Exam:      Constitutional: frail looking  HEENT: NC/AT, EOMI, PERRLA, conjunctivae clear  Neck: supple; thyroid not palpable  Back: no tenderness  Respiratory: respiratory effort normal; decreased BS at bases  Cardiovascular: S1S2 regular, no murmurs  Abdomen: soft, not tender, not distended, positive BS  Genitourinary: no suprapubic tenderness; scrotal edema and erythema and open wound s/p surgery - clean  Lymphatic: no LN palpable  Musculoskeletal: no muscle tenderness, no joint swelling or tenderness  Extremities: no pedal edema  Neurological/ Psychiatric: AxOx3, moving all extremities  Skin: no rashes; no palpable lesions    Labs: reviewed                        9.0    8.73  )-----------( 340      ( 21 Mar 2019 05:53 )             28.2     03-21    136  |  104  |  31<H>  ----------------------------<  96  4.4   |  29  |  1.16    Ca    8.1<L>      21 Mar 2019 05:53               9.7    9.38  )-----------( 166      ( 14 Mar 2019 05:57 )             30.0     03-14    141  |  111<H>  |  53<H>  ----------------------------<  141<H>  4.1   |  24  |  1.28    Ca    8.2<L>      14 Mar 2019 05:57    TPro  5.2<L>  /  Alb  1.7<L>  /  TBili  0.6  /  DBili  x   /  AST  33  /  ALT  25  /  AlkPhos  102  03-14    Vancomycin Level, Trough: 9.5 ug/mL (03-14 @ 05:57)      03-12    143  |  111<H>  |  64<H>  ----------------------------<  141<H>  3.7   |  22  |  1.76<H>    Culture - Surgical Swab (03.12.19 @ 20:06)    -  Levofloxacin: I 4    -  Levofloxacin: S <=2    -  Tobramycin: S <=4    -  Tobramycin: S <=4    -  Trimethoprim/Sulfamethoxazole: S <=2/38    -  Trimethoprim/Sulfamethoxazole: S <=2/38    -  Amikacin: S <=16    -  Amikacin: S <=16    -  Ampicillin: S <=8 These ampicillin results predict results for amoxicillin    -  Ampicillin: S <=8 These ampicillin results predict results for amoxicillin    -  Ampicillin/Sulbactam: S <=8/4    -  Ampicillin/Sulbactam: S <=8/4    -  Aztreonam: R >16    -  Aztreonam: S <=4    -  Cefazolin: S <=8    -  Cefazolin: S <=8    -  Cefepime: S <=4    -  Cefepime: S <=4    -  Cefoxitin: S <=8    -  Cefoxitin: S <=8    -  Ceftriaxone: S <=1 Enterobacter, Citrobacter, and Serratia may develop resistance during prolonged therapy    -  Ceftriaxone: S <=1 Enterobacter, Citrobacter, and Serratia may develop resistance during prolonged therapy    -  Ciprofloxacin: S <=1    -  Ciprofloxacin: S <=1    -  Ertapenem: S <=1    -  Ertapenem: S <=1    -  Gentamicin: S <=4    -  Gentamicin: S <=4    -  Imipenem: S <=1    -  Meropenem: S <=1    -  Meropenem: S <=1    -  Piperacillin/Tazobactam: S <=16    -  Piperacillin/Tazobactam: S <=16    Specimen Source: .Surgical Swab SCROTAL WOUND # 1    Culture Results:   Few Proteus mirabilis  Few Escherichia coli  Moderate Bacteroides thetaiotaomicron "Susceptibilities not performed"    Organism Identification: Proteus mirabilis  Escherichia coli    Organism: Proteus mirabilis    Organism: Escherichia coli    Method Type: JANESSA    Method Type: JANESSA      Ca    8.2<L>      12 Mar 2019 06:35    Culture - Blood (collected 10 Mar 2019 21:50)  Source: .Blood None  Preliminary Report (12 Mar 2019 01:02):    No growth to date.    Culture - Blood (collected 10 Mar 2019 21:50)  Source: .Blood None  Gram Stain (12 Mar 2019 17:26):    Growth in anaerobic bottle: Gram Negative Rods  Final Report (13 Mar 2019 19:21):    Growth in anaerobic bottle: Bacteroides fragilis    "Susceptibilities not performed"      Culture - Urine (collected 10 Mar 2019 21:43)  Source: .Urine Catheterized  Final Report (12 Mar 2019 19:14):    >100,000 CFU/ml Escherichia coli  Organism: Escherichia coli (12 Mar 2019 19:14)  Organism: Escherichia coli (12 Mar 2019 19:14)      -  Amikacin: S <=16      -  Ampicillin: S <=8 These ampicillin results predict results for amoxicillin      -  Ampicillin/Sulbactam: S <=8/4      -  Aztreonam: S <=4      -  Cefazolin: S <=8 For uncomplicated UTI with K. pneumoniae, E. coli, or P. mirablis: JANESSA <=16 is sensitive and JANESSA >=32 is resistant. This also predicts results for oral agents cefaclor, cefdinir, cefpodoxime, cefprozil, cefuroxime axetil, cephalexin and locarbef for uncomplicated UTI. Note that some isolates may be susceptible to these agents while testing resistant to cefazolin.      -  Cefepime: S <=4      -  Cefoxitin: S <=8      -  Ceftriaxone: S <=1 Enterobacter, Citrobacter, and Serratia may develop resistance during prolonged therapy      -  Ciprofloxacin: S <=1      -  Ertapenem: S <=1      -  Gentamicin: S <=4      -  Imipenem: S <=1      -  Levofloxacin: S <=2      -  Meropenem: S <=1      -  Nitrofurantoin: S <=32 Should not be used to treat pyelonephritis      -  Piperacillin/Tazobactam: S <=16      -  Tigecycline: S <=2      -  Tobramycin: S <=4      -  Trimethoprim/Sulfamethoxazole: S <=2/38      Method Type: JANESSA        Radiology: all available radiological tests reviewed    < from: CT Pelvis No Cont (03.12.19 @ 13:36) >  Extensive soft tissue gas in the left scrotum suspicious for Teetee's   gangrene.  Left groin hernia containing nonobstructed sigmoid colon.  Partially visualized soft tissue mass surrounding the aorta suspicious   for metastatic lymphadenopathy.    < end of copied text >    < from: US Testicles (03.12.19 @ 09:46) >  Diffuse scrotal wall emphysema suspicious for gas-forming infection.  Small complex left hydrocele.  Unremarkable testes.    < end of copied text >      Advanced directives addressed: full resuscitation

## 2019-03-22 NOTE — PROGRESS NOTE ADULT - ASSESSMENT
89 y/o M PMHx significant for hypertension, hyperlipidemia, meningioma, recurrent mechanical falls, and cervical myelopathy/radiculopathy  admitted for:     1) S/p Fall  - mechanical vs syncope?   - found to be septic   - telemetry: AFIB with bradycardia and pauses   - EP eval appreciated S/p MICRA placement   - TSH elevated, T3/T4: WNL, likely sick euthyroid vs subclinical, will need repeat labs in few weeks   - fall precautions  - PT         2)  Sepsis with bacteroides in Blood,  Cellulitis with proteus/E coli/ Bacteroides, E.Coli UTI.  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, scrotum and penile shaft pod #8   improving   C/w local wound care as per Urology orders   C/w IV Zosyn, change to PO  Augmentin   C/w Jay   Discussed  with Plastic Sx, to OR on Wednesday id still inpt otherwise will arrange outPt   D/w DR Devi  Urology  f/u appreciated        3) JAMIE on CKD, improved   -  pre-renal azotemia vs ATN  - S/p  IV hydration with Improvement of renal Fx  - Encourage oral hydration   - hold Furosemide and ACEI/ARB  - Monitor UO       4)HTN  BP borderline low   Hold meds:  was on  Amlodipine 10mg po daily  Lisinopril 40mg po daily   Metoprolol XL 100mg po daily      5) Severe Protein calorie Malnutrition. Hypoalbuminemia    encourage oral intake, supplements       6) Peripheral edema  likely due to IV hydration and hypoalbuminemia   Doppler neg for DVT  Elevate extremities  Edema improved       7) Elevated TSH  will check T3/T4: WNL  Repeat labs in few weeks     8) AFIB with slow ventricular response  New DX?   Metoprolol held   [id number] score: 5, high risk   CArdio eval appreciated, no recommendation for A/c     9) Soft tissue mass surrounding proximal aorta, suspicious for metastatic lymphadenopathy   incidental finding on CT   No h/o malignancy as per Pt, but not following with PCP   Hem/Onc eval appreciated, possible reactive due to sepsis? Recommended IR eval for possible Bx   D/w IR, recommends CT abd/pelvis with IV contrast before Bx       10) DVT PPxs         Dispo: c/w current care

## 2019-03-22 NOTE — PROGRESS NOTE ADULT - ASSESSMENT
89 y/o Male with h/o hypertension, hyperlipidemia, meningioma, recurrent mechanical falls, cervical myelopathy/radiculopathy was admitted on 3/12 for increased weakness and unwitnessed fall. In the ED the patient was found to have skin tears to his right upper arm. The patient notes that the fall occurred upon ambulating to the bathroom and was unable to get up on his own. In the ED the patient received zosyn and vancomycin IV.    1. 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 S/p sepsis with bacteroides fragilis. CRF stage 3.   -improved  -wound cultures reviewed  -on zosyn 3.375 gm IV q8h # 10  -tolerating abx well so far; no side effects noted  -renal function is improved  -urology evaluation appreciated  -may proceed with micra placement  -change abx to augmentin 875 mg PO q12h   -plastics reconstruction surgery was postponed   -local wound care  -monitor temps  -f/u CBC  -supportive care  2. Other issues:   -care per medicine

## 2019-03-22 NOTE — PACU DISCHARGE NOTE - COMMENTS
easily aroused. vss remains on 2 l NC, right groin site intact. setting vvi 60, cm v paced. cxr done.  Report given to Jerri rn on 3e. verified cardiac rhythm with Edin TEIXEIRA on 3e. Home monitor given to pt.  Unable to provide education at this time.

## 2019-03-22 NOTE — PROGRESS NOTE ADULT - SUBJECTIVE AND OBJECTIVE BOX
UROLOGY FOLLOW UP NOTE    SUBJECTIVE    Patient seen and examined at bedside.  No acute events overnight.    Current complaints are: none    Scheduled for pacemaker insertion today  Plastic reconstruction with STSG scheduled for Weds 3/27/19        OBJECTIVE    T(C): 36.3 (03-22-19 @ 04:27), Max: 36.7 (03-21-19 @ 16:38)  HR: 56 (03-22-19 @ 04:27)  BP: 98/65 (03-22-19 @ 04:27)  RR: 19 (03-22-19 @ 04:27)  SpO2: 99% (03-22-19 @ 04:27)    03-21-19 @ 07:01  -  03-22-19 @ 07:00  --------------------------------------------------------  IN: 0 mL / OUT: 1750 mL / NET: -1750 mL      Gen: elderly Male in NAD, well nourished  HEENT: normocephalic, hearing intact, moist mucus membranes  Chest: non labored breathing  ABd: soft, NT, ND, no masses  Back: no CVA tenderness  : no suprapubic distension or tenderness, genital wound looks healthy without evidence of ischemia or drainage, zhou draining clear urine  Psych: normal affect and mood  Ext: moves all four extremities freely    Procedure: wet to dry dressing with saline changed by author today. Abd pads placed over dressing and reinforced with tape    03-21    136  |  104  |  31<H>  ----------------------------<  96  4.4   |  29  |  1.16    Ca    8.1<L>      21 Mar 2019 05:53        CBC Full  -  ( 21 Mar 2019 05:53 )  WBC Count : 8.73 K/uL  Hemoglobin : 9.0 g/dL  Hematocrit : 28.2 %  Platelet Count - Automated : 340 K/uL  Mean Cell Volume : 101.8 fl  Mean Cell Hemoglobin : 32.5 pg  Mean Cell Hemoglobin Concentration : 31.9 gm/dL  Auto Neutrophil # : x  Auto Lymphocyte # : x  Auto Monocyte # : x  Auto Eosinophil # : x  Auto Basophil # : x  Auto Neutrophil % : x  Auto Lymphocyte % : x  Auto Monocyte % : x  Auto Eosinophil % : x  Auto Basophil % : x      acetaminophen   Tablet .. 650 milliGRAM(s) Oral every 6 hours PRN  acetaminophen   Tablet .. 650 milliGRAM(s) Oral every 6 hours PRN  bisacodyl Suppository 10 milliGRAM(s) Rectal daily PRN  docusate sodium 100 milliGRAM(s) Oral three times a day  morphine ER Tablet 15 milliGRAM(s) Oral two times a day  oxyCODONE    IR 5 milliGRAM(s) Oral every 4 hours PRN  piperacillin/tazobactam IVPB. 3.375 Gram(s) IV Intermittent every 8 hours  polyethylene glycol 3350 17 Gram(s) Oral daily PRN  senna 2 Tablet(s) Oral at bedtime  zinc oxide 40% Ointment 1 Application(s) Topical daily      ASSESSMENT & PLAN    1. Teetee's gangrene POD#10 after debridement  - No need for further debridement at this time  - continue with daily wet to dry  - Plastic surgery reconstruction 3/27/19        Thank you for allowing me to participate in the care of this patient  Please call with questions or concerns    Tootie Chamorro MD    [facility]  [phone]

## 2019-03-22 NOTE — PROGRESS NOTE ADULT - SUBJECTIVE AND OBJECTIVE BOX
CC: Recurrent Falls (19 Mar 2019 17:27)    HPI:  88 y/o M PMHx significant for hypertension, hyperlipidemia, meningioma, recurrent mechanical falls, and cervical myelopathy/radiculopathy presents to the ED for further evaluation of an unwitnessed fall last night. In the ED the patient was found to have notable skin tears to his right upper arm. The patient notes that the fall occurred upon ambulating to the bathroom and was unable to get up on his own. Labs => WBC 12.09, Hgb/Hct 11.1/32.8, LA 1.6, Alb 2.6, BUN/Cr 64/1.80, , TnI (-) x 3, Bands 17%. In the ED the patient received NS x 1.5L. (10 Mar 2019 23:00)    INTERVAL HPI/ OVERNIGHT EVENTS:  Pt was seen and examined,  S/p  MICRA placement, tolerated well, no complains       Vital Signs Last 24 Hrs  T(C): 36.6 (22 Mar 2019 17:20), Max: 36.9 (22 Mar 2019 11:14)  T(F): 97.8 (22 Mar 2019 17:20), Max: 98.5 (22 Mar 2019 11:14)  HR: 60 (22 Mar 2019 17:20) (56 - 66)  BP: 119/65 (22 Mar 2019 17:20) (98/65 - 161/77)  RR: 16 (22 Mar 2019 15:05) (16 - 19)  SpO2: 99% (22 Mar 2019 17:20) (94% - 99%)      REVIEW OF SYSTEMS:  All other review of systems is negative unless indicated above.      PHYSICAL EXAM:  General: Well developed; well nourished; in no acute distress  Eyes: PERRLA, EOMI; conjunctiva and sclera clear  Head: Normocephalic; atraumatic  ENMT: No nasal discharge; airway clear  Neck: Supple; non tender; no masses  Respiratory: No wheezes, rales or rhonchi  Cardiovascular: Regular rate and rhythm. S1 and S2 Normal; No murmurs, gallops or rubs  Gastrointestinal: Soft non-tender non-distended; Normal bowel sounds. + umbilical hernia, reducible   Genitourinary: No suprapubic  tenderness. scrotal and posterior penile wound clean, + granulation tissues, dressing applied   Extremities: Normal range of motion, No edema  Vascular: Peripheral pulses palpable 2+ bilaterally. Groin dressing D/C/I   Neurological: Alert and oriented x4  Skin: Warm and dry. No acute rash  Lymph Nodes: No acute cervical adenopathy  Musculoskeletal: Normal muscle  tone, without deformities  Psychiatric: Cooperative and appropriate                LABS:                          9.0    8.73  )-----------( 340      ( 21 Mar 2019 05:53 )             28.2     03-21    136  |  104  |  31<H>  ----------------------------<  96  4.4   |  29  |  1.16    Ca    8.1<L>      21 Mar 2019 05:53      PT/INR - ( 21 Mar 2019 05:53 )   PT: 13.2 sec;   INR: 1.18 ratio    PTT - ( 21 Mar 2019 05:53 )  PTT:30.2 sec                            9.0    8.73  )-----------( 340      ( 21 Mar 2019 05:53 )             28.2     03-21    136  |  104  |  31<H>  ----------------------------<  96  4.4   |  29  |  1.16    Ca    8.1<L>      21 Mar 2019 05:53    PT/INR - ( 21 Mar 2019 05:53 )   PT: 13.2 sec;   INR: 1.18 ratio      PTT - ( 21 Mar 2019 05:53 )  PTT:30.2 sec                 9.0    8.84  )-----------( 274      ( 18 Mar 2019 05:37 )             28.1     18 Mar 2019 05:37    137    |  107    |  31     ----------------------------<  106    4.5     |  26     |  1.03     Ca    7.9        18 Mar 2019 05:37    TPro  5.3    /  Alb  1.6    /  TBili  0.4    /  DBili  x      /  AST  22     /  ALT  24     /  AlkPhos  109    18 Mar 2019 05:37      LIVER FUNCTIONS - ( 18 Mar 2019 05:37 )  Alb: 1.6 g/dL / Pro: 5.3 gm/dL / ALK PHOS: 109 U/L / ALT: 24 U/L / AST: 22 U/L / GGT: x           Culture - Surgical Swab (03.12.19 @ 20:06)    Specimen Source: .Surgical Swab SCROTAL WOUND # 2    Culture Results:   Few Proteus mirabilis  Few Escherichia coli  See previous culture [id number]  Moderate Bacteroides fragilis "Susceptibilities not performed"    Culture - Surgical Swab (03.12.19 @ 20:06)    -  Imipenem: S <=1    -  Levofloxacin: I 4    -  Levofloxacin: S <=2    -  Meropenem: S <=1    -  Meropenem: S <=1    -  Piperacillin/Tazobactam: S <=16    -  Piperacillin/Tazobactam: S <=16    -  Tobramycin: S <=4    -  Tobramycin: S <=4    -  Trimethoprim/Sulfamethoxazole: S <=2/38    -  Trimethoprim/Sulfamethoxazole: S <=2/38    -  Amikacin: S <=16    -  Amikacin: S <=16    -  Ampicillin: S <=8 These ampicillin results predict results for amoxicillin    -  Ampicillin: S <=8 These ampicillin results predict results for amoxicillin    -  Ampicillin/Sulbactam: S <=8/4    -  Ampicillin/Sulbactam: S <=8/4    -  Aztreonam: R >16    -  Aztreonam: S <=4    -  Cefazolin: S <=8    -  Cefazolin: S <=8    -  Cefepime: S <=4    -  Cefepime: S <=4    -  Cefoxitin: S <=8    -  Cefoxitin: S <=8    -  Ceftriaxone: S <=1 Enterobacter, Citrobacter, and Serratia may develop resistance during prolonged therapy    -  Ceftriaxone: S <=1 Enterobacter, Citrobacter, and Serratia may develop resistance during prolonged therapy    -  Ciprofloxacin: S <=1    -  Ciprofloxacin: S <=1    -  Ertapenem: S <=1    -  Ertapenem: S <=1    -  Gentamicin: S <=4    -  Gentamicin: S <=4    Specimen Source: .Surgical Swab SCROTAL WOUND # 1    Culture Results:   Few Proteus mirabilis  Few Escherichia coli    Organism: Proteus mirabilis    Organism: Escherichia coli    Culture - Blood (03.10.19 @ 21:50)    -  Multidrug (KPC pos) resistant organism: Nondet    -  Staphylococcus aureus: Nondet    -  Methicillin resistant Staphylococcus aureus (MRSA): Nondet    -  Coagulase negative Staphylococcus: Nondet    -  Enterococcus species: Nondet    -  Vancomycin resistant Enterococcus sp.: Nondet    -  Escherichia coli: Nondet    -  Klebsiella oxytoca: Nondet    -  Klebsiella pneumoniae: Nondet    -  Serratia marcescens: Nondet    -  Haemophilus influenzae: Nondet    -  Listeria monocytogenes: Nondet    -  Neisseria meningitidis: Nondet    -  Pseudomonas aeruginosa: Nondet    -  Acinetobacter baumanii: Nondet    -  Enterobacter cloacae complex: Nondet    -  Streptococcus sp. (Not Grp A, B or S pneumoniae): Nondet    -  Streptococcus agalactiae (Group B): Nondet    -  Streptococcus pyogenes (Group A): Nondet    -  Streptococcus pneumoniae: Nondet    -  Candida albicans: Nondet    -  Candida glabrata: Nondet    -  Candida krusei: Nondet    -  Candida parapsilosis: Nondet    -  Candida tropicalis: Nondet    Gram Stain:   Growth in anaerobic bottle: Gram Negative Rods  Growth in anaerobic bottle: Bacteroides fragilis    Culture - Urine (03.10.19 @ 21:43)    -  Ampicillin: S <=8 These ampicillin results predict results for amoxicillin    -  Aztreonam: S <=4    -  Ampicillin/Sulbactam: S <=8/4    -  Amikacin: S <=16    -  Imipenem: S <=1    -  Gentamicin: S <=4    -  Ertapenem: S <=1    -  Ciprofloxacin: S <=1    -  Ceftriaxone: S <=1 Enterobacter, Citrobacter, and Serratia may develop resistance during prolonged therapy    -  Cefoxitin: S <=8    -  Cefepime: S <=4    -  Cefazolin: S <=8 For uncomplicated UTI with K. pneumoniae, E. coli, or P. mirablis: JANESSA <=16 is sensitive and JANESSA >=32 is resistant. This also predicts results for oral agents cefaclor, cefdinir, cefpodoxime, cefprozil, cefuroxime axetil, cephalexin and locarbef for uncomplicated UTI. Note that some isolates may be susceptible to these agents while testing resistant to cefazolin.    -  Trimethoprim/Sulfamethoxazole: S <=2/38    -  Tigecycline: S <=2    -  Tobramycin: S <=4    -  Nitrofurantoin: S <=32 Should not be used to treat pyelonephritis    -  Piperacillin/Tazobactam: S <=16    -  Meropenem: S <=1    -  Levofloxacin: S <=2    Specimen Source: .Urine Catheterized    Culture Results:   >100,000 CFU/ml Escherichia coli        MEDICATIONS  (STANDING):  amoxicillin  875 milliGRAM(s)/clavulanate 1 Tablet(s) Oral every 12 hours  docusate sodium 100 milliGRAM(s) Oral three times a day  morphine ER Tablet 15 milliGRAM(s) Oral two times a day  senna 2 Tablet(s) Oral at bedtime  zinc oxide 40% Ointment 1 Application(s) Topical daily    MEDICATIONS  (PRN):  acetaminophen   Tablet .. 650 milliGRAM(s) Oral every 6 hours PRN Temp greater or equal to 38C (100.4F), Mild Pain (1 - 3)  acetaminophen   Tablet .. 650 milliGRAM(s) Oral every 6 hours PRN Mild Pain (1 - 3)  bisacodyl Suppository 10 milliGRAM(s) Rectal daily PRN Constipation  oxyCODONE    IR 5 milliGRAM(s) Oral every 4 hours PRN Moderate Pain (4 - 6)  polyethylene glycol 3350 17 Gram(s) Oral daily PRN Constipation      RADIOLOGY & ADDITIONAL TESTS:  EXAM:  CT PELVIS ONLY                        PROCEDURE DATE:  03/12/2019      COMPARISON: None    FINDINGS: There is extensive soft tissue gas in the left scrotum. Gas   extends superiorly to the base of the penis. There is no gas in the right   scrotum. Small bilateral hydroceles are noted as described on recent   ultrasound. There is a left groin hernia containing nonobstructed sigmoid   colon. There is a radiodense linear foreign object within the sigmoid   colon located within the hernia.    Partially visualized mass surrounds the distal aorta. The visualized   aorta and iliac arteries are normal in caliber. Colonic diverticulosis   noted without diverticulitis. Moderate sized umbilical hernia contains   nonobstructed small intestine. There is diffuse anasarca. No acute bony   abnormality is identified.    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  Left groin hernia containing nonobstructed sigmoid colon.  Partially visualized soft tissue mass surrounding the aorta suspicious   for metastatic lymphadenopathy.      EXAM:  US DPLX UPR EXT VEINS LTD RT                        PROCEDURE DATE:  03/18/2019    FINDINGS:    The right internal jugular, subclavian, axillary, brachial, basilic and   cephalic veins are patent and compressible where applicable.   Doppler examination shows normal spontaneous and phasic flow.  IMPRESSION:   No evidence of right upper extremity deep venous thrombosis.         EXAM:  ECHO TTE WO CON COMP W DOP    PROCEDURE DATE:  03/18/2019       Summary   Normal appearing mitral valve structure.   Mild (1+) mitral regurgitation is present.   Fibrocalcific changes noted to the Aortic valve leaflets with preserved   leaflet excursion.   Normal appearing tricuspid valve structure.   Mild (1+) tricuspid valve regurgitation is present.   Pulmonic valve not well seen.   Trace pulmonic valvular regurgitation is present.   The left atrium is severely dilated.   Estimated left ventricular ejection fraction is 55-60 %.   The left ventricle is normal in size, wall thickness, wall motion and   contractility as seen in limited views.   Normal appearing right atrium.   Normal appearing right ventricle structure and function.   All visualized extra cardiac structures appears to be normal.   No evidence of pericardial effusion.

## 2019-03-23 DIAGNOSIS — I49.5 SICK SINUS SYNDROME: ICD-10-CM

## 2019-03-23 LAB
ANION GAP SERPL CALC-SCNC: 5 MMOL/L — SIGNIFICANT CHANGE UP (ref 5–17)
BUN SERPL-MCNC: 30 MG/DL — HIGH (ref 7–23)
CALCIUM SERPL-MCNC: 8 MG/DL — LOW (ref 8.5–10.1)
CHLORIDE SERPL-SCNC: 107 MMOL/L — SIGNIFICANT CHANGE UP (ref 96–108)
CO2 SERPL-SCNC: 27 MMOL/L — SIGNIFICANT CHANGE UP (ref 22–31)
CREAT SERPL-MCNC: 1.05 MG/DL — SIGNIFICANT CHANGE UP (ref 0.5–1.3)
GLUCOSE SERPL-MCNC: 102 MG/DL — HIGH (ref 70–99)
HCT VFR BLD CALC: 26.2 % — LOW (ref 39–50)
HGB BLD-MCNC: 8.2 G/DL — LOW (ref 13–17)
MCHC RBC-ENTMCNC: 31.3 GM/DL — LOW (ref 32–36)
MCHC RBC-ENTMCNC: 32.2 PG — SIGNIFICANT CHANGE UP (ref 27–34)
MCV RBC AUTO: 102.7 FL — HIGH (ref 80–100)
NRBC # BLD: 0 /100 WBCS — SIGNIFICANT CHANGE UP (ref 0–0)
PLATELET # BLD AUTO: 305 K/UL — SIGNIFICANT CHANGE UP (ref 150–400)
POTASSIUM SERPL-MCNC: 4.1 MMOL/L — SIGNIFICANT CHANGE UP (ref 3.5–5.3)
POTASSIUM SERPL-SCNC: 4.1 MMOL/L — SIGNIFICANT CHANGE UP (ref 3.5–5.3)
RBC # BLD: 2.55 M/UL — LOW (ref 4.2–5.8)
RBC # FLD: 14.6 % — HIGH (ref 10.3–14.5)
SODIUM SERPL-SCNC: 139 MMOL/L — SIGNIFICANT CHANGE UP (ref 135–145)
WBC # BLD: 6.84 K/UL — SIGNIFICANT CHANGE UP (ref 3.8–10.5)
WBC # FLD AUTO: 6.84 K/UL — SIGNIFICANT CHANGE UP (ref 3.8–10.5)

## 2019-03-23 PROCEDURE — 99232 SBSQ HOSP IP/OBS MODERATE 35: CPT

## 2019-03-23 PROCEDURE — 93010 ELECTROCARDIOGRAM REPORT: CPT

## 2019-03-23 RX ADMIN — ZINC OXIDE 1 APPLICATION(S): 200 OINTMENT TOPICAL at 12:26

## 2019-03-23 RX ADMIN — Medication 100 MILLIGRAM(S): at 21:10

## 2019-03-23 RX ADMIN — Medication 1 TABLET(S): at 17:24

## 2019-03-23 RX ADMIN — Medication 1 TABLET(S): at 05:29

## 2019-03-23 RX ADMIN — MORPHINE SULFATE 15 MILLIGRAM(S): 50 CAPSULE, EXTENDED RELEASE ORAL at 05:29

## 2019-03-23 RX ADMIN — SENNA PLUS 2 TABLET(S): 8.6 TABLET ORAL at 21:10

## 2019-03-23 RX ADMIN — Medication 100 MILLIGRAM(S): at 05:29

## 2019-03-23 RX ADMIN — Medication 100 MILLIGRAM(S): at 15:03

## 2019-03-23 NOTE — PROGRESS NOTE ADULT - ASSESSMENT
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 He had a scrotal cellulitis which is being scheduled for future reconstructive surgery. Pt found to have significant pauses on tele.  s/p Micra PPM implant for TBS on 3/22/19.  Pt is stable post PPM.

## 2019-03-23 NOTE — PROGRESS NOTE ADULT - SUBJECTIVE AND OBJECTIVE BOX
INTERVAL HPI/OVERNIGHT EVENTS: Pt is resting in the bed, NAD. denies acute pain/SOB/palpitations.      MEDICATIONS  (STANDING):  amoxicillin  875 milliGRAM(s)/clavulanate 1 Tablet(s) Oral every 12 hours  docusate sodium 100 milliGRAM(s) Oral three times a day  morphine ER Tablet 15 milliGRAM(s) Oral two times a day  senna 2 Tablet(s) Oral at bedtime  zinc oxide 40% Ointment 1 Application(s) Topical daily    MEDICATIONS  (PRN):  acetaminophen   Tablet .. 650 milliGRAM(s) Oral every 6 hours PRN Temp greater or equal to 38C (100.4F), Mild Pain (1 - 3)  acetaminophen   Tablet .. 650 milliGRAM(s) Oral every 6 hours PRN Mild Pain (1 - 3)  bisacodyl Suppository 10 milliGRAM(s) Rectal daily PRN Constipation  oxyCODONE    IR 5 milliGRAM(s) Oral every 4 hours PRN Moderate Pain (4 - 6)  polyethylene glycol 3350 17 Gram(s) Oral daily PRN Constipation      Allergies    No Known Allergies    ROS:  General: Pt denies recent weight loss/fever/chills    Neurological: denies numbness or  sensation loss    Cardiovascular: denies chest pain/palpitations/leg edema    Respiratory and Thorax: denies SOB/cough/wheezing    Gastrointestinal: denies abdominal pain/diarrhea/constipation/bloody stool    Genitourinary: denies urinary frequency/urgency/ dysuria    Musculoskeletal: denies joint pain or swelling, denies restricted motion    Hematologic: denies abnormal bleeding  	    Physical Exam:  Vital Signs Last 24 Hrs  T(C): 36.8 (23 Mar 2019 04:24), Max: 36.9 (22 Mar 2019 11:14)  T(F): 98.3 (23 Mar 2019 04:24), Max: 98.5 (22 Mar 2019 11:14)  HR: 60 (23 Mar 2019 04:24) (60 - 66)  BP: 100/51 (23 Mar 2019 04:24) (98/65 - 161/77)  RR: 17 (23 Mar 2019 04:24) (16 - 18)  SpO2: 97% (23 Mar 2019 04:24) (94% - 99%)             Constitutional: well developed, well nourished, no deformities and no acute distress    Neurological: Alert & Oriented x 3, OSEGUERA, no focal deficits    HEENT: NC/AT, PERRLA, EOMI,  Neck supple.    Respiratory: diminished B/L bases    Cardiovascular: (+) S1 & S2,    Gastrointestinal: soft, NT, nondistended, (+) BS    Extremities: Rt groin suture removed, soft, NT, no hematoma    LABS:                        8.2    6.84  )-----------( 305      ( 23 Mar 2019 05:34 )             26.2     03-23    139  |  107  |  30<H>  ----------------------------<  102<H>  4.1   |  27  |  1.05    Ca    8.0<L>      23 Mar 2019 05:34    RADIOLOGY & ADDITIONAL TESTS:    TELE: Afib 60's bpm, V-paced    EKG: Afib 60 bpm, V-paced    CXR: Micra PPM in placed, (+) B/L pleural effusions

## 2019-03-23 NOTE — PHYSICAL THERAPY INITIAL EVALUATION ADULT - DIAGNOSIS, PT EVAL
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

## 2019-03-23 NOTE — PROGRESS NOTE ADULT - ASSESSMENT
88 y/o M PMHx significant for hypertension, hyperlipidemia, meningioma, recurrent mechanical falls, and cervical myelopathy/radiculopathy  admitted for:     1) S/p Fall  - mechanical vs syncope?   - found to be septic   - telemetry: AFIB with bradycardia and pauses   - EP eval appreciated S/p MICRA placement.   - TSH elevated, T3/T4: WNL, likely sick euthyroid vs subclinical, will need repeat labs in few weeks   - fall precautions  - PT   - EP eval appreciated has alma rosa for 4/5 for wound check, cleared for bb if  HR elevated         2)  Sepsis with bacteroides in Blood,  Cellulitis with proteus/E coli/ Bacteroides, E.Coli UTI.  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, scrotum and penile shaft pod #9   improving   C/w local wound care as per Urology orders   s/p IV Zosyn, changed  to PO  Augmentin   C/w Jay   Discussed  with Plastic Sx, to OR on Wednesday   D/w DR Devi  Urology  f/u appreciated        3) JAMIE on CKD, improved   -  pre-renal azotemia vs ATN  - S/p  IV hydration with Improvement of renal Fx  - Encourage oral hydration   - hold Furosemide and ACEI/ARB  - Monitor UO       4)HTN  BP improved, low normal now   Cont Hold meds:  was on  Amlodipine 10mg po daily  Lisinopril 40mg po daily   Metoprolol XL 100mg po daily      5) Severe Protein calorie Malnutrition. Hypoalbuminemia    encourage oral intake, supplements       6) Peripheral edema  likely due to IV hydration and hypoalbuminemia   Doppler neg for DVT  Elevate extremities  Edema improved       7) Elevated TSH  will check T3/T4: WNL  Repeat labs in few weeks     8) AFIB with slow ventricular response  New DX?   Metoprolol held   [id number] score: 5, high risk   CArdio eval appreciated, no recommendation for A/c     9) Soft tissue mass surrounding proximal aorta, suspicious for metastatic lymphadenopathy   incidental finding on CT   No h/o malignancy as per Pt, but not following with PCP   Hem/Onc eval appreciated, possible reactive due to sepsis? Recommended IR eval for possible Bx   D/w IR, recommends CT abd/pelvis with IV contrast before Bx, will order in am       10) DVT PPxs         Dispo: c/w current care, d/c telemetry

## 2019-03-23 NOTE — PHYSICAL THERAPY INITIAL EVALUATION ADULT - MODALITIES TREATMENT COMMENTS
Pt OOB in chair w/ nursing assistance to PT, +alarm, +zhou,+flowtrons, Traytable in front, BARB RN aware. NAD
Patient left OOB in chair with CBIR. All lines intact, CNA in room for linen change. RN aware of session/status.
The pt demonstrated good overall activity tolerance, responding well to therex review, transfer and ambulation tx. The pt was left sitting OOB and in a chair with chair alarm secured, RN aware. CB, tray and phone in place. The pt was in NAD at end of tx.

## 2019-03-23 NOTE — PHYSICAL THERAPY INITIAL EVALUATION ADULT - GAIT DEVIATIONS NOTED, PT EVAL
Keeping LE's in full extension, posterior wt. shift.
decreased sayra/decreased step length
decreased stride length/decreased sayra/flexed posture

## 2019-03-23 NOTE — PHYSICAL THERAPY INITIAL EVALUATION ADULT - IMPAIRMENTS CONTRIBUTING TO GAIT DEVIATIONS, PT EVAL
impaired balance/cognition/impaired coordination/decreased strength
impaired balance/decreased strength
impaired sensory feedback/impaired balance/impaired coordination/decreased sensation/poor proprioceptive awareness of legs, and B/L LE sensation, the pt's feet tend to slip out from under him as he demonstrates fear avoidance behavior, leaning away from the left side in a posterior/right sided pushing motion, requiring much vc and manual cues to facilitate safe weight shifting and limb advancement.

## 2019-03-23 NOTE — PHYSICAL THERAPY INITIAL EVALUATION ADULT - LEVEL OF INDEPENDENCE: STAND/SIT, REHAB EVAL
moderate assist (50% patients effort)
minimum assist (75% patients effort)
minimum assist (75% patients effort)

## 2019-03-23 NOTE — PROGRESS NOTE ADULT - SUBJECTIVE AND OBJECTIVE BOX
UROLOGY FOLLOW UP NOTE    SUBJECTIVE    Patient seen and examined at bedside.  No acute events overnight.    Current complaints are: none        OBJECTIVE    T(C): 36.7 (03-23-19 @ 10:31), Max: 36.8 (03-23-19 @ 04:24)  HR: 60 (03-23-19 @ 10:31)  BP: 108/61 (03-23-19 @ 10:31)  RR: 18 (03-23-19 @ 10:31)  SpO2: 95% (03-23-19 @ 10:31)    03-23-19 @ 07:01  -  03-23-19 @ 15:50  --------------------------------------------------------  IN: 0 mL / OUT: 625 mL / NET: -625 mL        Gen: elderly Male in NAD, well nourished  HEENT: normocephalic, hearing intact, moist mucus membranes  Chest: non labored breathing  ABd: soft, NT, ND, no masses  Back: no CVA tenderness  : no suprapubic distension or tenderness, genital wound looks healthy without evidence of ischemia or drainage, zhou draining clear urine  Psych: normal affect and mood  Ext: moves all four extremities freely    Procedure: wet to dry dressing with saline changed by author today. Abd pads placed over dressing and reinforced with tape      03-23    139  |  107  |  30<H>  ----------------------------<  102<H>  4.1   |  27  |  1.05    Ca    8.0<L>      23 Mar 2019 05:34        CBC Full  -  ( 23 Mar 2019 05:34 )  WBC Count : 6.84 K/uL  Hemoglobin : 8.2 g/dL  Hematocrit : 26.2 %  Platelet Count - Automated : 305 K/uL  Mean Cell Volume : 102.7 fl  Mean Cell Hemoglobin : 32.2 pg  Mean Cell Hemoglobin Concentration : 31.3 gm/dL  Auto Neutrophil # : x  Auto Lymphocyte # : x  Auto Monocyte # : x  Auto Eosinophil # : x  Auto Basophil # : x  Auto Neutrophil % : x  Auto Lymphocyte % : x  Auto Monocyte % : x  Auto Eosinophil % : x  Auto Basophil % : x      acetaminophen   Tablet .. 650 milliGRAM(s) Oral every 6 hours PRN  acetaminophen   Tablet .. 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

## 2019-03-23 NOTE — PROGRESS NOTE ADULT - PROBLEM SELECTOR PLAN 1
-PPM booklet & temporary ID card given.  -PPM checked today, normal functioning.  -Post d/c & wound care instruction given to pt  and all questions answered  -Follow up appointment made on 4/5/19 @14:30 in EP clinic for wound check  - If pt develops chest pain/palpitations/shortness of breath/pain or swelling at procedure site, call EP clinic at [phone].  -If pt becomes tachycardic, may resume b-blockers for rate control.  -Recommend OAC unless contraindicated, however due to pt's h/o multiple falls, risks & benefits of longterm OAC should be discussed by primary care team (also pending discharge planning).

## 2019-03-23 NOTE — PHYSICAL THERAPY INITIAL EVALUATION ADULT - LEVEL OF INDEPENDENCE: GAIT, REHAB EVAL
maximum assist (25% patients effort)
moderate assist (50% patients effort)
moderate assist (50% patients effort)

## 2019-03-23 NOTE — PHYSICAL THERAPY INITIAL EVALUATION ADULT - GAIT DISTANCE, PT EVAL
taking 4-5 steps/bed to chair
bed to chair/2 feet
bed to chair/able to take a few steps to the chair, but fatigues easily.

## 2019-03-23 NOTE — PHYSICAL THERAPY INITIAL EVALUATION ADULT - ACTIVE RANGE OF MOTION EXAMINATION, REHAB EVAL
bilateral lower extremity Active ROM was WNL (within normal limits)/bilateral upper extremity Active ROM was WFL (within functional limits)
bilateral upper extremity Active ROM was WFL (within functional limits)/shoulders to 90*

## 2019-03-23 NOTE — PHYSICAL THERAPY INITIAL EVALUATION ADULT - LEVEL OF INDEPENDENCE: SUPINE/SIT, REHAB EVAL
moderate assist (50% patients effort)

## 2019-03-23 NOTE — PROGRESS NOTE ADULT - SUBJECTIVE AND OBJECTIVE BOX
CC: Recurrent Falls (19 Mar 2019 17:27)    HPI:  88 y/o M PMHx significant for hypertension, hyperlipidemia, meningioma, recurrent mechanical falls, and cervical myelopathy/radiculopathy presents to the ED for further evaluation of an unwitnessed fall last night. In the ED the patient was found to have notable skin tears to his right upper arm. The patient notes that the fall occurred upon ambulating to the bathroom and was unable to get up on his own. Labs => WBC 12.09, Hgb/Hct 11.1/32.8, LA 1.6, Alb 2.6, BUN/Cr 64/1.80, , TnI (-) x 3, Bands 17%. In the ED the patient received NS x 1.5L. (10 Mar 2019 23:00)    INTERVAL HPI/ OVERNIGHT EVENTS:  Pt was seen and examined,  was OOB to chair this am, now back to bed, reports feeling tired, no other complains.       Vital Signs Last 24 Hrs  T(C): 37 (23 Mar 2019 17:21), Max: 37 (23 Mar 2019 17:21)  T(F): 98.6 (23 Mar 2019 17:21), Max: 98.6 (23 Mar 2019 17:21)  HR: 60 (23 Mar 2019 17:21) (60 - 60)  BP: 110/57 (23 Mar 2019 17:21) (100/51 - 110/57)  RR: 18 (23 Mar 2019 10:31) (17 - 18)  SpO2: 98% (23 Mar 2019 17:21) (95% - 98%)      REVIEW OF SYSTEMS:  All other review of systems is negative unless indicated above.      PHYSICAL EXAM:  General: Well developed; well nourished; in no acute distress  Eyes: PERRLA, EOMI; conjunctiva and sclera clear  Head: Normocephalic; atraumatic  ENMT: No nasal discharge; airway clear  Neck: Supple; non tender; no masses  Respiratory: No wheezes, rales or rhonchi  Cardiovascular: Regular rate and rhythm. S1 and S2 Normal; No murmurs, gallops or rubs  Gastrointestinal: Soft non-tender non-distended; Normal bowel sounds. + umbilical hernia, reducible   Genitourinary: No suprapubic  tenderness. scrotal and posterior penile wound clean, + granulation tissues, dressing applied   Extremities: Normal range of motion, No edema  Vascular: Peripheral pulses palpable 2+ bilaterally. Groin dressing D/C/I   Neurological: Alert and oriented x4  Skin: Warm and dry. No acute rash  Lymph Nodes: No acute cervical adenopathy  Musculoskeletal: Normal muscle  tone, without deformities  Psychiatric: Cooperative and appropriate                LABS:                          8.2    6.84  )-----------( 305      ( 23 Mar 2019 05:34 )             26.2     03-23    139  |  107  |  30<H>  ----------------------------<  102<H>  4.1   |  27  |  1.05    Ca    8.0<L>      23 Mar 2019 05:34                              9.0    8.73  )-----------( 340      ( 21 Mar 2019 05:53 )             28.2     03-21    136  |  104  |  31<H>  ----------------------------<  96  4.4   |  29  |  1.16    Ca    8.1<L>      21 Mar 2019 05:53      PT/INR - ( 21 Mar 2019 05:53 )   PT: 13.2 sec;   INR: 1.18 ratio    PTT - ( 21 Mar 2019 05:53 )  PTT:30.2 sec                            9.0    8.73  )-----------( 340      ( 21 Mar 2019 05:53 )             28.2     03-21    136  |  104  |  31<H>  ----------------------------<  96  4.4   |  29  |  1.16    Ca    8.1<L>      21 Mar 2019 05:53    PT/INR - ( 21 Mar 2019 05:53 )   PT: 13.2 sec;   INR: 1.18 ratio      PTT - ( 21 Mar 2019 05:53 )  PTT:30.2 sec                 9.0    8.84  )-----------( 274      ( 18 Mar 2019 05:37 )             28.1     18 Mar 2019 05:37    137    |  107    |  31     ----------------------------<  106    4.5     |  26     |  1.03     Ca    7.9        18 Mar 2019 05:37    TPro  5.3    /  Alb  1.6    /  TBili  0.4    /  DBili  x      /  AST  22     /  ALT  24     /  AlkPhos  109    18 Mar 2019 05:37      LIVER FUNCTIONS - ( 18 Mar 2019 05:37 )  Alb: 1.6 g/dL / Pro: 5.3 gm/dL / ALK PHOS: 109 U/L / ALT: 24 U/L / AST: 22 U/L / GGT: x           Culture - Surgical Swab (03.12.19 @ 20:06)    Specimen Source: .Surgical Swab SCROTAL WOUND # 2    Culture Results:   Few Proteus mirabilis  Few Escherichia coli  See previous culture [id number]  Moderate Bacteroides fragilis "Susceptibilities not performed"    Culture - Surgical Swab (03.12.19 @ 20:06)    -  Imipenem: S <=1    -  Levofloxacin: I 4    -  Levofloxacin: S <=2    -  Meropenem: S <=1    -  Meropenem: S <=1    -  Piperacillin/Tazobactam: S <=16    -  Piperacillin/Tazobactam: S <=16    -  Tobramycin: S <=4    -  Tobramycin: S <=4    -  Trimethoprim/Sulfamethoxazole: S <=2/38    -  Trimethoprim/Sulfamethoxazole: S <=2/38    -  Amikacin: S <=16    -  Amikacin: S <=16    -  Ampicillin: S <=8 These ampicillin results predict results for amoxicillin    -  Ampicillin: S <=8 These ampicillin results predict results for amoxicillin    -  Ampicillin/Sulbactam: S <=8/4    -  Ampicillin/Sulbactam: S <=8/4    -  Aztreonam: R >16    -  Aztreonam: S <=4    -  Cefazolin: S <=8    -  Cefazolin: S <=8    -  Cefepime: S <=4    -  Cefepime: S <=4    -  Cefoxitin: S <=8    -  Cefoxitin: S <=8    -  Ceftriaxone: S <=1 Enterobacter, Citrobacter, and Serratia may develop resistance during prolonged therapy    -  Ceftriaxone: S <=1 Enterobacter, Citrobacter, and Serratia may develop resistance during prolonged therapy    -  Ciprofloxacin: S <=1    -  Ciprofloxacin: S <=1    -  Ertapenem: S <=1    -  Ertapenem: S <=1    -  Gentamicin: S <=4    -  Gentamicin: S <=4    Specimen Source: .Surgical Swab SCROTAL WOUND # 1    Culture Results:   Few Proteus mirabilis  Few Escherichia coli    Organism: Proteus mirabilis    Organism: Escherichia coli    Culture - Blood (03.10.19 @ 21:50)    -  Multidrug (KPC pos) resistant organism: Nondet    -  Staphylococcus aureus: Nondet    -  Methicillin resistant Staphylococcus aureus (MRSA): Nondet    -  Coagulase negative Staphylococcus: Nondet    -  Enterococcus species: Nondet    -  Vancomycin resistant Enterococcus sp.: Nondet    -  Escherichia coli: Nondet    -  Klebsiella oxytoca: Nondet    -  Klebsiella pneumoniae: Nondet    -  Serratia marcescens: Nondet    -  Haemophilus influenzae: Nondet    -  Listeria monocytogenes: Nondet    -  Neisseria meningitidis: Nondet    -  Pseudomonas aeruginosa: Nondet    -  Acinetobacter baumanii: Nondet    -  Enterobacter cloacae complex: Nondet    -  Streptococcus sp. (Not Grp A, B or S pneumoniae): Nondet    -  Streptococcus agalactiae (Group B): Nondet    -  Streptococcus pyogenes (Group A): Nondet    -  Streptococcus pneumoniae: Nondet    -  Candida albicans: Nondet    -  Candida glabrata: Nondet    -  Candida krusei: Nondet    -  Candida parapsilosis: Nondet    -  Candida tropicalis: Nondet    Gram Stain:   Growth in anaerobic bottle: Gram Negative Rods  Growth in anaerobic bottle: Bacteroides fragilis    Culture - Urine (03.10.19 @ 21:43)    -  Ampicillin: S <=8 These ampicillin results predict results for amoxicillin    -  Aztreonam: S <=4    -  Ampicillin/Sulbactam: S <=8/4    -  Amikacin: S <=16    -  Imipenem: S <=1    -  Gentamicin: S <=4    -  Ertapenem: S <=1    -  Ciprofloxacin: S <=1    -  Ceftriaxone: S <=1 Enterobacter, Citrobacter, and Serratia may develop resistance during prolonged therapy    -  Cefoxitin: S <=8    -  Cefepime: S <=4    -  Cefazolin: S <=8 For uncomplicated UTI with K. pneumoniae, E. coli, or P. mirablis: JANESSA <=16 is sensitive and JANESSA >=32 is resistant. This also predicts results for oral agents cefaclor, cefdinir, cefpodoxime, cefprozil, cefuroxime axetil, cephalexin and locarbef for uncomplicated UTI. Note that some isolates may be susceptible to these agents while testing resistant to cefazolin.    -  Trimethoprim/Sulfamethoxazole: S <=2/38    -  Tigecycline: S <=2    -  Tobramycin: S <=4    -  Nitrofurantoin: S <=32 Should not be used to treat pyelonephritis    -  Piperacillin/Tazobactam: S <=16    -  Meropenem: S <=1    -  Levofloxacin: S <=2    Specimen Source: .Urine Catheterized    Culture Results:   >100,000 CFU/ml Escherichia coli        MEDICATIONS  (STANDING):  amoxicillin  875 milliGRAM(s)/clavulanate 1 Tablet(s) Oral every 12 hours  docusate sodium 100 milliGRAM(s) Oral three times a day  morphine ER Tablet 15 milliGRAM(s) Oral two times a day  senna 2 Tablet(s) Oral at bedtime  zinc oxide 40% Ointment 1 Application(s) Topical daily    MEDICATIONS  (PRN):  acetaminophen   Tablet .. 650 milliGRAM(s) Oral every 6 hours PRN Temp greater or equal to 38C (100.4F), Mild Pain (1 - 3)  acetaminophen   Tablet .. 650 milliGRAM(s) Oral every 6 hours PRN Mild Pain (1 - 3)  bisacodyl Suppository 10 milliGRAM(s) Rectal daily PRN Constipation  oxyCODONE    IR 5 milliGRAM(s) Oral every 4 hours PRN Moderate Pain (4 - 6)  polyethylene glycol 3350 17 Gram(s) Oral daily PRN Constipation      RADIOLOGY & ADDITIONAL TESTS:  EXAM:  CT PELVIS ONLY                        PROCEDURE DATE:  03/12/2019      COMPARISON: None    FINDINGS: There is extensive soft tissue gas in the left scrotum. Gas   extends superiorly to the base of the penis. There is no gas in the right   scrotum. Small bilateral hydroceles are noted as described on recent   ultrasound. There is a left groin hernia containing nonobstructed sigmoid   colon. There is a radiodense linear foreign object within the sigmoid   colon located within the hernia.    Partially visualized mass surrounds the distal aorta. The visualized   aorta and iliac arteries are normal in caliber. Colonic diverticulosis   noted without diverticulitis. Moderate sized umbilical hernia contains   nonobstructed small intestine. There is diffuse anasarca. No acute bony   abnormality is identified.    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  Left groin hernia containing nonobstructed sigmoid colon.  Partially visualized soft tissue mass surrounding the aorta suspicious   for metastatic lymphadenopathy.      EXAM:  US DPLX UPR EXT VEINS LTD RT                        PROCEDURE DATE:  03/18/2019    FINDINGS:    The right internal jugular, subclavian, axillary, brachial, basilic and   cephalic veins are patent and compressible where applicable.   Doppler examination shows normal spontaneous and phasic flow.  IMPRESSION:   No evidence of right upper extremity deep venous thrombosis.         EXAM:  ECHO TTE WO CON COMP W DOP    PROCEDURE DATE:  03/18/2019       Summary   Normal appearing mitral valve structure.   Mild (1+) mitral regurgitation is present.   Fibrocalcific changes noted to the Aortic valve leaflets with preserved   leaflet excursion.   Normal appearing tricuspid valve structure.   Mild (1+) tricuspid valve regurgitation is present.   Pulmonic valve not well seen.   Trace pulmonic valvular regurgitation is present.   The left atrium is severely dilated.   Estimated left ventricular ejection fraction is 55-60 %.   The left ventricle is normal in size, wall thickness, wall motion and   contractility as seen in limited views.   Normal appearing right atrium.   Normal appearing right ventricle structure and function.   All visualized extra cardiac structures appears to be normal.   No evidence of pericardial effusion.

## 2019-03-23 NOTE — PHYSICAL THERAPY INITIAL EVALUATION ADULT - LEVEL OF INDEPENDENCE: SIT/STAND, REHAB EVAL
moderate assist (50% patients effort)
minimum assist (75% patients effort)
moderate assist (50% patients effort)

## 2019-03-24 RX ADMIN — ZINC OXIDE 1 APPLICATION(S): 200 OINTMENT TOPICAL at 09:43

## 2019-03-24 RX ADMIN — Medication 1 TABLET(S): at 18:00

## 2019-03-24 RX ADMIN — MORPHINE SULFATE 15 MILLIGRAM(S): 50 CAPSULE, EXTENDED RELEASE ORAL at 05:48

## 2019-03-24 RX ADMIN — Medication 100 MILLIGRAM(S): at 05:48

## 2019-03-24 RX ADMIN — Medication 1 TABLET(S): at 05:48

## 2019-03-24 NOTE — PROGRESS NOTE ADULT - ASSESSMENT
s/p MICRA implant POD #2-Left groin access site with tegaderm and 2x2 dry and intact       Patient feels well.  Denies chest pain, shortness of breath, dizziness or palpitations at this time.          TELE: Underlying AF, V pacing at 60     GENERAL: appears well,   CV: RRR, S1 S2, no murmur, rub, clicks or gallop noted  RESP: LCTA bilaterally, no wheeze, no rhonchi or crackles noted  GI/: soft, NT/ND  NEURO: AOx4, no focal deficits  EXTREMITIES: no edema, clubbing or cyanosis noted s/p MICRA implant POD #2-Right groin access site with tegaderm and 2x2 dry and intact       Patient feels well.  Denies chest pain, shortness of breath, dizziness or palpitations at this time.          TELE: Underlying AF, V pacing at 60     GENERAL: appears well,   CV: RRR, S1 S2, no murmur, rub, clicks or gallop noted  RESP: LCTA bilaterally, no wheeze, no rhonchi or crackles noted  GI/: soft, NT/ND  NEURO: AOx4, no focal deficits  EXTREMITIES: no edema, clubbing or cyanosis noted

## 2019-03-24 NOTE — PROGRESS NOTE ADULT - SUBJECTIVE AND OBJECTIVE BOX
CC: Recurrent Falls (19 Mar 2019 17:27)    HPI:  88 y/o M PMHx significant for hypertension, hyperlipidemia, meningioma, recurrent mechanical falls, and cervical myelopathy/radiculopathy presents to the ED for further evaluation of an unwitnessed fall last night. In the ED the patient was found to have notable skin tears to his right upper arm. The patient notes that the fall occurred upon ambulating to the bathroom and was unable to get up on his own. Labs => WBC 12.09, Hgb/Hct 11.1/32.8, LA 1.6, Alb 2.6, BUN/Cr 64/1.80, , TnI (-) x 3, Bands 17%. In the ED the patient received NS x 1.5L. (10 Mar 2019 23:00)    INTERVAL HPI/ OVERNIGHT EVENTS:  Pt was seen and examined,  was OOB to chair this am, now back to bed, reports feeling tired, no other complains.       Vital Signs Last 24 Hrs  T(C): 36.2 (24 Mar 2019 10:16), Max: 37 (23 Mar 2019 17:21)  T(F): 97.1 (24 Mar 2019 10:16), Max: 98.6 (23 Mar 2019 17:21)  HR: 62 (24 Mar 2019 10:16) (60 - 62)  BP: 111/666 (24 Mar 2019 10:16) (110/57 - 114/57)  RR: 18 (24 Mar 2019 10:16) (18 - 18)  SpO2: 96% (24 Mar 2019 10:16) (96% - 98%)    REVIEW OF SYSTEMS:  All other review of systems is negative unless indicated above.      PHYSICAL EXAM:  General: Well developed; well nourished; in no acute distress  Eyes: PERRLA, EOMI; conjunctiva and sclera clear  Head: Normocephalic; atraumatic  ENMT: No nasal discharge; airway clear  Neck: Supple; non tender; no masses  Respiratory: No wheezes, rales or rhonchi  Cardiovascular: Regular rate and rhythm. S1 and S2 Normal; No murmurs, gallops or rubs  Gastrointestinal: Soft non-tender non-distended; Normal bowel sounds. + umbilical hernia, reducible   Genitourinary: No suprapubic  tenderness. scrotal and posterior penile wound clean, + granulation tissues, dressing applied   Extremities: Normal range of motion, No edema  Vascular: Peripheral pulses palpable 2+ bilaterally. Groin dressing D/C/I   Neurological: Alert and oriented x4  Skin: Warm and dry. No acute rash  Lymph Nodes: No acute cervical adenopathy  Musculoskeletal: Normal muscle  tone, without deformities  Psychiatric: Cooperative and appropriate                LABS:                          8.2    6.84  )-----------( 305      ( 23 Mar 2019 05:34 )             26.2     03-23    139  |  107  |  30<H>  ----------------------------<  102<H>  4.1   |  27  |  1.05    Ca    8.0<L>      23 Mar 2019 05:34                              9.0    8.73  )-----------( 340      ( 21 Mar 2019 05:53 )             28.2     03-21    136  |  104  |  31<H>  ----------------------------<  96  4.4   |  29  |  1.16    Ca    8.1<L>      21 Mar 2019 05:53      PT/INR - ( 21 Mar 2019 05:53 )   PT: 13.2 sec;   INR: 1.18 ratio    PTT - ( 21 Mar 2019 05:53 )  PTT:30.2 sec                            9.0    8.73  )-----------( 340      ( 21 Mar 2019 05:53 )             28.2     03-21    136  |  104  |  31<H>  ----------------------------<  96  4.4   |  29  |  1.16    Ca    8.1<L>      21 Mar 2019 05:53    PT/INR - ( 21 Mar 2019 05:53 )   PT: 13.2 sec;   INR: 1.18 ratio      PTT - ( 21 Mar 2019 05:53 )  PTT:30.2 sec                 9.0    8.84  )-----------( 274      ( 18 Mar 2019 05:37 )             28.1     18 Mar 2019 05:37    137    |  107    |  31     ----------------------------<  106    4.5     |  26     |  1.03     Ca    7.9        18 Mar 2019 05:37    TPro  5.3    /  Alb  1.6    /  TBili  0.4    /  DBili  x      /  AST  22     /  ALT  24     /  AlkPhos  109    18 Mar 2019 05:37      LIVER FUNCTIONS - ( 18 Mar 2019 05:37 )  Alb: 1.6 g/dL / Pro: 5.3 gm/dL / ALK PHOS: 109 U/L / ALT: 24 U/L / AST: 22 U/L / GGT: x           Culture - Surgical Swab (03.12.19 @ 20:06)    Specimen Source: .Surgical Swab SCROTAL WOUND # 2    Culture Results:   Few Proteus mirabilis  Few Escherichia coli  See previous culture [id number]  Moderate Bacteroides fragilis "Susceptibilities not performed"    Culture - Surgical Swab (03.12.19 @ 20:06)    -  Imipenem: S <=1    -  Levofloxacin: I 4    -  Levofloxacin: S <=2    -  Meropenem: S <=1    -  Meropenem: S <=1    -  Piperacillin/Tazobactam: S <=16    -  Piperacillin/Tazobactam: S <=16    -  Tobramycin: S <=4    -  Tobramycin: S <=4    -  Trimethoprim/Sulfamethoxazole: S <=2/38    -  Trimethoprim/Sulfamethoxazole: S <=2/38    -  Amikacin: S <=16    -  Amikacin: S <=16    -  Ampicillin: S <=8 These ampicillin results predict results for amoxicillin    -  Ampicillin: S <=8 These ampicillin results predict results for amoxicillin    -  Ampicillin/Sulbactam: S <=8/4    -  Ampicillin/Sulbactam: S <=8/4    -  Aztreonam: R >16    -  Aztreonam: S <=4    -  Cefazolin: S <=8    -  Cefazolin: S <=8    -  Cefepime: S <=4    -  Cefepime: S <=4    -  Cefoxitin: S <=8    -  Cefoxitin: S <=8    -  Ceftriaxone: S <=1 Enterobacter, Citrobacter, and Serratia may develop resistance during prolonged therapy    -  Ceftriaxone: S <=1 Enterobacter, Citrobacter, and Serratia may develop resistance during prolonged therapy    -  Ciprofloxacin: S <=1    -  Ciprofloxacin: S <=1    -  Ertapenem: S <=1    -  Ertapenem: S <=1    -  Gentamicin: S <=4    -  Gentamicin: S <=4    Specimen Source: .Surgical Swab SCROTAL WOUND # 1    Culture Results:   Few Proteus mirabilis  Few Escherichia coli    Organism: Proteus mirabilis    Organism: Escherichia coli    Culture - Blood (03.10.19 @ 21:50)    -  Multidrug (KPC pos) resistant organism: Nondet    -  Staphylococcus aureus: Nondet    -  Methicillin resistant Staphylococcus aureus (MRSA): Nondet    -  Coagulase negative Staphylococcus: Nondet    -  Enterococcus species: Nondet    -  Vancomycin resistant Enterococcus sp.: Nondet    -  Escherichia coli: Nondet    -  Klebsiella oxytoca: Nondet    -  Klebsiella pneumoniae: Nondet    -  Serratia marcescens: Nondet    -  Haemophilus influenzae: Nondet    -  Listeria monocytogenes: Nondet    -  Neisseria meningitidis: Nondet    -  Pseudomonas aeruginosa: Nondet    -  Acinetobacter baumanii: Nondet    -  Enterobacter cloacae complex: Nondet    -  Streptococcus sp. (Not Grp A, B or S pneumoniae): Nondet    -  Streptococcus agalactiae (Group B): Nondet    -  Streptococcus pyogenes (Group A): Nondet    -  Streptococcus pneumoniae: Nondet    -  Candida albicans: Nondet    -  Candida glabrata: Nondet    -  Candida krusei: Nondet    -  Candida parapsilosis: Nondet    -  Candida tropicalis: Nondet    Gram Stain:   Growth in anaerobic bottle: Gram Negative Rods  Growth in anaerobic bottle: Bacteroides fragilis    Culture - Urine (03.10.19 @ 21:43)    -  Ampicillin: S <=8 These ampicillin results predict results for amoxicillin    -  Aztreonam: S <=4    -  Ampicillin/Sulbactam: S <=8/4    -  Amikacin: S <=16    -  Imipenem: S <=1    -  Gentamicin: S <=4    -  Ertapenem: S <=1    -  Ciprofloxacin: S <=1    -  Ceftriaxone: S <=1 Enterobacter, Citrobacter, and Serratia may develop resistance during prolonged therapy    -  Cefoxitin: S <=8    -  Cefepime: S <=4    -  Cefazolin: S <=8 For uncomplicated UTI with K. pneumoniae, E. coli, or P. mirablis: JANESSA <=16 is sensitive and JANESSA >=32 is resistant. This also predicts results for oral agents cefaclor, cefdinir, cefpodoxime, cefprozil, cefuroxime axetil, cephalexin and locarbef for uncomplicated UTI. Note that some isolates may be susceptible to these agents while testing resistant to cefazolin.    -  Trimethoprim/Sulfamethoxazole: S <=2/38    -  Tigecycline: S <=2    -  Tobramycin: S <=4    -  Nitrofurantoin: S <=32 Should not be used to treat pyelonephritis    -  Piperacillin/Tazobactam: S <=16    -  Meropenem: S <=1    -  Levofloxacin: S <=2    Specimen Source: .Urine Catheterized    Culture Results:   >100,000 CFU/ml Escherichia coli        MEDICATIONS  (STANDING):  amoxicillin  875 milliGRAM(s)/clavulanate 1 Tablet(s) Oral every 12 hours  docusate sodium 100 milliGRAM(s) Oral three times a day  morphine ER Tablet 15 milliGRAM(s) Oral two times a day  senna 2 Tablet(s) Oral at bedtime  zinc oxide 40% Ointment 1 Application(s) Topical daily    MEDICATIONS  (PRN):  acetaminophen   Tablet .. 650 milliGRAM(s) Oral every 6 hours PRN Temp greater or equal to 38C (100.4F), Mild Pain (1 - 3)  acetaminophen   Tablet .. 650 milliGRAM(s) Oral every 6 hours PRN Mild Pain (1 - 3)  bisacodyl Suppository 10 milliGRAM(s) Rectal daily PRN Constipation  oxyCODONE    IR 5 milliGRAM(s) Oral every 4 hours PRN Moderate Pain (4 - 6)  polyethylene glycol 3350 17 Gram(s) Oral daily PRN Constipation      RADIOLOGY & ADDITIONAL TESTS:  EXAM:  CT PELVIS ONLY                        PROCEDURE DATE:  03/12/2019      COMPARISON: None    FINDINGS: There is extensive soft tissue gas in the left scrotum. Gas   extends superiorly to the base of the penis. There is no gas in the right   scrotum. Small bilateral hydroceles are noted as described on recent   ultrasound. There is a left groin hernia containing nonobstructed sigmoid   colon. There is a radiodense linear foreign object within the sigmoid   colon located within the hernia.    Partially visualized mass surrounds the distal aorta. The visualized   aorta and iliac arteries are normal in caliber. Colonic diverticulosis   noted without diverticulitis. Moderate sized umbilical hernia contains   nonobstructed small intestine. There is diffuse anasarca. No acute bony   abnormality is identified.    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  Left groin hernia containing nonobstructed sigmoid colon.  Partially visualized soft tissue mass surrounding the aorta suspicious   for metastatic lymphadenopathy.      EXAM:  US DPLX UPR EXT VEINS LTD RT                        PROCEDURE DATE:  03/18/2019    FINDINGS:    The right internal jugular, subclavian, axillary, brachial, basilic and   cephalic veins are patent and compressible where applicable.   Doppler examination shows normal spontaneous and phasic flow.  IMPRESSION:   No evidence of right upper extremity deep venous thrombosis.         EXAM:  ECHO TTE WO CON COMP W DOP    PROCEDURE DATE:  03/18/2019       Summary   Normal appearing mitral valve structure.   Mild (1+) mitral regurgitation is present.   Fibrocalcific changes noted to the Aortic valve leaflets with preserved   leaflet excursion.   Normal appearing tricuspid valve structure.   Mild (1+) tricuspid valve regurgitation is present.   Pulmonic valve not well seen.   Trace pulmonic valvular regurgitation is present.   The left atrium is severely dilated.   Estimated left ventricular ejection fraction is 55-60 %.   The left ventricle is normal in size, wall thickness, wall motion and   contractility as seen in limited views.   Normal appearing right atrium.   Normal appearing right ventricle structure and function.   All visualized extra cardiac structures appears to be normal.   No evidence of pericardial effusion. CC: Recurrent Falls (19 Mar 2019 17:27)    HPI:  88 y/o M PMHx significant for hypertension, hyperlipidemia, meningioma, recurrent mechanical falls, and cervical myelopathy/radiculopathy presents to the ED for further evaluation of an unwitnessed fall last night. In the ED the patient was found to have notable skin tears to his right upper arm. The patient notes that the fall occurred upon ambulating to the bathroom and was unable to get up on his own. Labs => WBC 12.09, Hgb/Hct 11.1/32.8, LA 1.6, Alb 2.6, BUN/Cr 64/1.80, , TnI (-) x 3, Bands 17%. In the ED the patient received NS x 1.5L. (10 Mar 2019 23:00)    INTERVAL HPI/ OVERNIGHT EVENTS:  Pt was seen and examined,  no complains, results oc CT abdomen and plan fpr repeat and possible Bx discussed       Vital Signs Last 24 Hrs  T(C): 36.2 (24 Mar 2019 10:16), Max: 37 (23 Mar 2019 17:21)  T(F): 97.1 (24 Mar 2019 10:16), Max: 98.6 (23 Mar 2019 17:21)  HR: 62 (24 Mar 2019 10:16) (60 - 62)  BP: 111/66 (24 Mar 2019 10:16) (110/57 - 114/57)  RR: 18 (24 Mar 2019 10:16) (18 - 18)  SpO2: 96% (24 Mar 2019 10:16) (96% - 98%)        REVIEW OF SYSTEMS:  All other review of systems is negative unless indicated above.      PHYSICAL EXAM:  General: Well developed; well nourished; in no acute distress  Eyes: PERRLA, EOMI; conjunctiva and sclera clear  Head: Normocephalic; atraumatic  ENMT: No nasal discharge; airway clear  Neck: Supple; non tender; no masses  Respiratory: No wheezes, rales or rhonchi  Cardiovascular: Regular rate and rhythm. S1 and S2 Normal; No murmurs, gallops or rubs  Gastrointestinal: Soft non-tender non-distended; Normal bowel sounds. + umbilical hernia, reducible   Genitourinary: No suprapubic  tenderness. scrotal and posterior penile wound clean, + granulation tissues, dressing applied   Extremities: Normal range of motion, No edema  Vascular: Peripheral pulses palpable 2+ bilaterally. Groin dressing D/C/I   Neurological: Alert and oriented x4  Skin: Warm and dry. No acute rash  Lymph Nodes: No acute cervical adenopathy  Musculoskeletal: Normal muscle  tone, without deformities  Psychiatric: Cooperative and appropriate                LABS:                                     8.2    6.84  )-----------( 305      ( 23 Mar 2019 05:34 )             26.2     03-23    139  |  107  |  30<H>  ----------------------------<  102<H>  4.1   |  27  |  1.05    Ca    8.0<L>      23 Mar 2019 05:34                     8.2    6.84  )-----------( 305      ( 23 Mar 2019 05:34 )             26.2     03-23    139  |  107  |  30<H>  ----------------------------<  102<H>  4.1   |  27  |  1.05    Ca    8.0<L>      23 Mar 2019 05:34                              9.0    8.73  )-----------( 340      ( 21 Mar 2019 05:53 )             28.2     03-21    136  |  104  |  31<H>  ----------------------------<  96  4.4   |  29  |  1.16    Ca    8.1<L>      21 Mar 2019 05:53      PT/INR - ( 21 Mar 2019 05:53 )   PT: 13.2 sec;   INR: 1.18 ratio    PTT - ( 21 Mar 2019 05:53 )  PTT:30.2 sec                            9.0    8.73  )-----------( 340      ( 21 Mar 2019 05:53 )             28.2     03-21    136  |  104  |  31<H>  ----------------------------<  96  4.4   |  29  |  1.16    Ca    8.1<L>      21 Mar 2019 05:53    PT/INR - ( 21 Mar 2019 05:53 )   PT: 13.2 sec;   INR: 1.18 ratio      PTT - ( 21 Mar 2019 05:53 )  PTT:30.2 sec                 9.0    8.84  )-----------( 274      ( 18 Mar 2019 05:37 )             28.1     18 Mar 2019 05:37    137    |  107    |  31     ----------------------------<  106    4.5     |  26     |  1.03     Ca    7.9        18 Mar 2019 05:37    TPro  5.3    /  Alb  1.6    /  TBili  0.4    /  DBili  x      /  AST  22     /  ALT  24     /  AlkPhos  109    18 Mar 2019 05:37      LIVER FUNCTIONS - ( 18 Mar 2019 05:37 )  Alb: 1.6 g/dL / Pro: 5.3 gm/dL / ALK PHOS: 109 U/L / ALT: 24 U/L / AST: 22 U/L / GGT: x           Culture - Surgical Swab (03.12.19 @ 20:06)    Specimen Source: .Surgical Swab SCROTAL WOUND # 2    Culture Results:   Few Proteus mirabilis  Few Escherichia coli  See previous culture [id number]  Moderate Bacteroides fragilis "Susceptibilities not performed"    Culture - Surgical Swab (03.12.19 @ 20:06)    -  Imipenem: S <=1    -  Levofloxacin: I 4    -  Levofloxacin: S <=2    -  Meropenem: S <=1    -  Meropenem: S <=1    -  Piperacillin/Tazobactam: S <=16    -  Piperacillin/Tazobactam: S <=16    -  Tobramycin: S <=4    -  Tobramycin: S <=4    -  Trimethoprim/Sulfamethoxazole: S <=2/38    -  Trimethoprim/Sulfamethoxazole: S <=2/38    -  Amikacin: S <=16    -  Amikacin: S <=16    -  Ampicillin: S <=8 These ampicillin results predict results for amoxicillin    -  Ampicillin: S <=8 These ampicillin results predict results for amoxicillin    -  Ampicillin/Sulbactam: S <=8/4    -  Ampicillin/Sulbactam: S <=8/4    -  Aztreonam: R >16    -  Aztreonam: S <=4    -  Cefazolin: S <=8    -  Cefazolin: S <=8    -  Cefepime: S <=4    -  Cefepime: S <=4    -  Cefoxitin: S <=8    -  Cefoxitin: S <=8    -  Ceftriaxone: S <=1 Enterobacter, Citrobacter, and Serratia may develop resistance during prolonged therapy    -  Ceftriaxone: S <=1 Enterobacter, Citrobacter, and Serratia may develop resistance during prolonged therapy    -  Ciprofloxacin: S <=1    -  Ciprofloxacin: S <=1    -  Ertapenem: S <=1    -  Ertapenem: S <=1    -  Gentamicin: S <=4    -  Gentamicin: S <=4    Specimen Source: .Surgical Swab SCROTAL WOUND # 1    Culture Results:   Few Proteus mirabilis  Few Escherichia coli    Organism: Proteus mirabilis    Organism: Escherichia coli    Culture - Blood (03.10.19 @ 21:50)    -  Multidrug (KPC pos) resistant organism: Nondet    -  Staphylococcus aureus: Nondet    -  Methicillin resistant Staphylococcus aureus (MRSA): Nondet    -  Coagulase negative Staphylococcus: Nondet    -  Enterococcus species: Nondet    -  Vancomycin resistant Enterococcus sp.: Nondet    -  Escherichia coli: Nondet    -  Klebsiella oxytoca: Nondet    -  Klebsiella pneumoniae: Nondet    -  Serratia marcescens: Nondet    -  Haemophilus influenzae: Nondet    -  Listeria monocytogenes: Nondet    -  Neisseria meningitidis: Nondet    -  Pseudomonas aeruginosa: Nondet    -  Acinetobacter baumanii: Nondet    -  Enterobacter cloacae complex: Nondet    -  Streptococcus sp. (Not Grp A, B or S pneumoniae): Nondet    -  Streptococcus agalactiae (Group B): Nondet    -  Streptococcus pyogenes (Group A): Nondet    -  Streptococcus pneumoniae: Nondet    -  Candida albicans: Nondet    -  Candida glabrata: Nondet    -  Candida krusei: Nondet    -  Candida parapsilosis: Nondet    -  Candida tropicalis: Nondet    Gram Stain:   Growth in anaerobic bottle: Gram Negative Rods  Growth in anaerobic bottle: Bacteroides fragilis    Culture - Urine (03.10.19 @ 21:43)    -  Ampicillin: S <=8 These ampicillin results predict results for amoxicillin    -  Aztreonam: S <=4    -  Ampicillin/Sulbactam: S <=8/4    -  Amikacin: S <=16    -  Imipenem: S <=1    -  Gentamicin: S <=4    -  Ertapenem: S <=1    -  Ciprofloxacin: S <=1    -  Ceftriaxone: S <=1 Enterobacter, Citrobacter, and Serratia may develop resistance during prolonged therapy    -  Cefoxitin: S <=8    -  Cefepime: S <=4    -  Cefazolin: S <=8 For uncomplicated UTI with K. pneumoniae, E. coli, or P. mirablis: JANESSA <=16 is sensitive and JANESSA >=32 is resistant. This also predicts results for oral agents cefaclor, cefdinir, cefpodoxime, cefprozil, cefuroxime axetil, cephalexin and locarbef for uncomplicated UTI. Note that some isolates may be susceptible to these agents while testing resistant to cefazolin.    -  Trimethoprim/Sulfamethoxazole: S <=2/38    -  Tigecycline: S <=2    -  Tobramycin: S <=4    -  Nitrofurantoin: S <=32 Should not be used to treat pyelonephritis    -  Piperacillin/Tazobactam: S <=16    -  Meropenem: S <=1    -  Levofloxacin: S <=2    Specimen Source: .Urine Catheterized    Culture Results:   >100,000 CFU/ml Escherichia coli        MEDICATIONS  (STANDING):  amoxicillin  875 milliGRAM(s)/clavulanate 1 Tablet(s) Oral every 12 hours  docusate sodium 100 milliGRAM(s) Oral three times a day  morphine ER Tablet 15 milliGRAM(s) Oral two times a day  senna 2 Tablet(s) Oral at bedtime  zinc oxide 40% Ointment 1 Application(s) Topical daily    MEDICATIONS  (PRN):  acetaminophen   Tablet .. 650 milliGRAM(s) Oral every 6 hours PRN Temp greater or equal to 38C (100.4F), Mild Pain (1 - 3)  acetaminophen   Tablet .. 650 milliGRAM(s) Oral every 6 hours PRN Mild Pain (1 - 3)  bisacodyl Suppository 10 milliGRAM(s) Rectal daily PRN Constipation  oxyCODONE    IR 5 milliGRAM(s) Oral every 4 hours PRN Moderate Pain (4 - 6)  polyethylene glycol 3350 17 Gram(s) Oral daily PRN Constipation      RADIOLOGY & ADDITIONAL TESTS:  EXAM:  CT PELVIS ONLY                        PROCEDURE DATE:  03/12/2019      COMPARISON: None    FINDINGS: There is extensive soft tissue gas in the left scrotum. Gas   extends superiorly to the base of the penis. There is no gas in the right   scrotum. Small bilateral hydroceles are noted as described on recent   ultrasound. There is a left groin hernia containing nonobstructed sigmoid   colon. There is a radiodense linear foreign object within the sigmoid   colon located within the hernia.    Partially visualized mass surrounds the distal aorta. The visualized   aorta and iliac arteries are normal in caliber. Colonic diverticulosis   noted without diverticulitis. Moderate sized umbilical hernia contains   nonobstructed small intestine. There is diffuse anasarca. No acute bony   abnormality is identified.    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  Left groin hernia containing nonobstructed sigmoid colon.  Partially visualized soft tissue mass surrounding the aorta suspicious   for metastatic lymphadenopathy.      EXAM:  US DPLX UPR EXT VEINS LTD RT                        PROCEDURE DATE:  03/18/2019    FINDINGS:    The right internal jugular, subclavian, axillary, brachial, basilic and   cephalic veins are patent and compressible where applicable.   Doppler examination shows normal spontaneous and phasic flow.  IMPRESSION:   No evidence of right upper extremity deep venous thrombosis.         EXAM:  ECHO TTE WO CON COMP W DOP    PROCEDURE DATE:  03/18/2019       Summary   Normal appearing mitral valve structure.   Mild (1+) mitral regurgitation is present.   Fibrocalcific changes noted to the Aortic valve leaflets with preserved   leaflet excursion.   Normal appearing tricuspid valve structure.   Mild (1+) tricuspid valve regurgitation is present.   Pulmonic valve not well seen.   Trace pulmonic valvular regurgitation is present.   The left atrium is severely dilated.   Estimated left ventricular ejection fraction is 55-60 %.   The left ventricle is normal in size, wall thickness, wall motion and   contractility as seen in limited views.   Normal appearing right atrium.   Normal appearing right ventricle structure and function.   All visualized extra cardiac structures appears to be normal.   No evidence of pericardial effusion.

## 2019-03-24 NOTE — PROGRESS NOTE ADULT - ASSESSMENT
89 y/o M PMHx significant for hypertension, hyperlipidemia, meningioma, recurrent mechanical falls, and cervical myelopathy/radiculopathy  admitted for:     1) S/p Fall  - mechanical vs syncope?   - found to be septic   - telemetry: AFIB with bradycardia and pauses   - EP eval appreciated S/p MICRA placement.   - TSH elevated, T3/T4: WNL, likely sick euthyroid vs subclinical, will need repeat labs in few weeks   - fall precautions  - PT   - EP eval appreciated has alma rosa for 4/5 for wound check, cleared for bb if  HR elevated         2)  Sepsis with bacteroides in Blood,  Cellulitis with proteus/E coli/ Bacteroides, E.Coli UTI.  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, scrotum and penile shaft pod #9   improving   C/w local wound care as per Urology orders   s/p IV Zosyn, changed  to PO  Augmentin   C/w Jay   Discussed  with Plastic Sx, to OR on Wednesday   D/w DR Devi  Urology  f/u appreciated        3) JAMIE on CKD, improved   -  pre-renal azotemia vs ATN  - S/p  IV hydration with Improvement of renal Fx  - Encourage oral hydration   - hold Furosemide and ACEI/ARB  - Monitor UO       4)HTN  BP improved, low normal now   Cont Hold meds:  was on  Amlodipine 10mg po daily  Lisinopril 40mg po daily   Metoprolol XL 100mg po daily      5) Severe Protein calorie Malnutrition. Hypoalbuminemia    encourage oral intake, supplements       6) Peripheral edema  likely due to IV hydration and hypoalbuminemia   Doppler neg for DVT  Elevate extremities  Edema improved       7) Elevated TSH  will check T3/T4: WNL  Repeat labs in few weeks     8) AFIB with slow ventricular response  New DX?   Metoprolol held   [id number] score: 5, high risk   CArdio eval appreciated, no recommendation for A/c     9) Soft tissue mass surrounding proximal aorta, suspicious for metastatic lymphadenopathy   incidental finding on CT   No h/o malignancy as per Pt, but not following with PCP   Hem/Onc eval appreciated, possible reactive due to sepsis? Recommended IR eval for possible Bx   D/w IR, recommends CT abd/pelvis with IV contrast before Bx, will order in am       10) DVT PPxs         Dispo: c/w current care, d/c telemetry 88 y/o M PMHx significant for hypertension, hyperlipidemia, meningioma, recurrent mechanical falls, and cervical myelopathy/radiculopathy  admitted for:     1) S/p Fall  - mechanical vs syncope?   - found to be septic   - telemetry: AFIB with bradycardia and pauses   - EP eval appreciated S/p MICRA placement.   - TSH elevated, T3/T4: WNL, likely sick euthyroid vs subclinical, will need repeat labs in few weeks   - fall precautions  - PT   - EP eval appreciated has alma rosa for 4/5 for wound check, cleared for bb if  HR elevated         2)  Sepsis with bacteroides in Blood,  Cellulitis with proteus/E coli/ Bacteroides, E.Coli UTI.  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, scrotum and penile shaft pod #9   improving   C/w local wound care as per Urology orders   s/p IV Zosyn, changed  to PO  Augmentin   C/w Jay   Discussed  with Plastic Sx, to OR on Wednesday   Urology  f/u appreciated        3) JAMIE on CKD, improved   -  pre-renal azotemia vs ATN  - S/p  IV hydration with Improvement of renal Fx  - Encourage oral hydration   - hold Furosemide and ACEI/ARB  - Monitor UO       4)HTN  BP improved, low normal now   Cont Hold meds:  was on  Amlodipine 10mg po daily  Lisinopril 40mg po daily   Metoprolol XL 100mg po daily      5) Severe Protein calorie Malnutrition. Hypoalbuminemia    encourage oral intake, supplements       6) Peripheral edema  likely due to IV hydration and hypoalbuminemia   Doppler neg for DVT  Elevate extremities  Edema improved       7) Elevated TSH  will check T3/T4: WNL  Repeat labs in few weeks     8) AFIB with slow ventricular response  New DX?   Metoprolol held   [id number] score: 5, high risk   CArdio eval appreciated, no recommendation for A/c     9) Soft tissue mass surrounding proximal aorta, suspicious for metastatic lymphadenopathy   incidental finding on CT   No h/o malignancy as per Pt, but not following with PCP   Hem/Onc eval appreciated, possible reactive due to sepsis? Recommended IR eval for possible Bx   D/w IR, recommends CT abd/pelvis with IV contrast before Bx ordered       10) DVT PPxs         Dispo: c/w current care, d/c telemetry

## 2019-03-24 NOTE — PROGRESS NOTE ADULT - SUBJECTIVE AND OBJECTIVE BOX
Nurse Practitioner Progress note:   s/p MICRA implant POD #2-Left groin access site with tegaderm and 2x2 dry and intact       Patient feels well.  Denies chest pain, shortness of breath, dizziness or palpitations at this time.          TELE: Underlying AF, V pacing at 60     GENERAL: appears well,   CV: RRR, S1 S2, no murmur, rub, clicks or gallop noted  RESP: LCTA bilaterally, no wheeze, no rhonchi or crackles noted  GI/: soft, NT/ND  NEURO: AOx4, no focal deficits  EXTREMITIES: no edema, clubbing or cyanosis noted        T(C): 36.2 (03-24-19 @ 10:16), Max: 37 (03-23-19 @ 17:21)  HR: 62 (03-24-19 @ 10:16) (60 - 62)  BP: 111/666 (03-24-19 @ 10:16) (110/57 - 114/57)  RR: 18 (03-24-19 @ 10:16) (18 - 18)  SpO2: 96% (03-24-19 @ 10:16) (96% - 98%)  Wt(kg): --  CBC Full  -  ( 23 Mar 2019 05:34 )  WBC Count : 6.84 K/uL  Hemoglobin : 8.2 g/dL  Hematocrit : 26.2 %  Platelet Count - Automated : 305 K/uL  Mean Cell Volume : 102.7 fl  Mean Cell Hemoglobin : 32.2 pg  Mean Cell Hemoglobin Concentration : 31.3 gm/dL  Auto Neutrophil # : x  Auto Lymphocyte # : x  Auto Monocyte # : x  Auto Eosinophil # : x  Auto Basophil # : x  Auto Neutrophil % : x  Auto Lymphocyte % : x  Auto Monocyte % : x  Auto Eosinophil % : x  Auto Basophil % : x    03-23    139  |  107  |  30<H>  ----------------------------<  102<H>  4.1   |  27  |  1.05    Ca    8.0<L>      23 Mar 2019 05:34              MEDICATIONS  (STANDING):  amoxicillin  875 milliGRAM(s)/clavulanate 1 Tablet(s) Oral every 12 hours  docusate sodium 100 milliGRAM(s) Oral three times a day  morphine ER Tablet 15 milliGRAM(s) Oral two times a day  senna 2 Tablet(s) Oral at bedtime  zinc oxide 40% Ointment 1 Application(s) Topical daily    MEDICATIONS  (PRN):  acetaminophen   Tablet .. 650 milliGRAM(s) Oral every 6 hours PRN Temp greater or equal to 38C (100.4F), Mild Pain (1 - 3)  acetaminophen   Tablet .. 650 milliGRAM(s) Oral every 6 hours PRN Mild Pain (1 - 3)  bisacodyl Suppository 10 milliGRAM(s) Rectal daily PRN Constipation  oxyCODONE    IR 5 milliGRAM(s) Oral every 4 hours PRN Moderate Pain (4 - 6)  polyethylene glycol 3350 17 Gram(s) Oral daily PRN Constipation        HPI:  88 y/o M PMHx significant for hypertension, hyperlipidemia, meningioma, recurrent mechanical falls, and cervical myelopathy/radiculopathy presents to the ED for further evaluation of an unwitnessed fall last night. In the ED the patient was found to have notable skin tears to his right upper arm. The patient notes that the fall occurred upon ambulating to the bathroom and was unable to get up on his own. Labs => WBC 12.09, Hgb/Hct 11.1/32.8, LA 1.6, Alb 2.6, BUN/Cr 64/1.80, , TnI (-) x 3, Bands 17%. In the ED the patient received NS x 1.5L. (10 Mar 2019 23:00)      ASSESSMENT/PLAN:    Continue tele monitoring  Continue to assess groin access site/ dry and intact at present Nurse Practitioner Progress note:   s/p MICRA implant POD #2-Right  groin access site with tegaderm and 2x2 dry and intact       Patient feels well.  Denies chest pain, shortness of breath, dizziness or palpitations at this time.          TELE: Underlying AF, V pacing at 60     GENERAL: appears well,   CV: RRR, S1 S2, no murmur, rub, clicks or gallop noted  RESP: LCTA bilaterally, no wheeze, no rhonchi or crackles noted  GI/: soft, NT/ND  NEURO: AOx4, no focal deficits  EXTREMITIES: no edema, clubbing or cyanosis noted        T(C): 36.2 (03-24-19 @ 10:16), Max: 37 (03-23-19 @ 17:21)  HR: 62 (03-24-19 @ 10:16) (60 - 62)  BP: 111/666 (03-24-19 @ 10:16) (110/57 - 114/57)  RR: 18 (03-24-19 @ 10:16) (18 - 18)  SpO2: 96% (03-24-19 @ 10:16) (96% - 98%)  Wt(kg): --  CBC Full  -  ( 23 Mar 2019 05:34 )  WBC Count : 6.84 K/uL  Hemoglobin : 8.2 g/dL  Hematocrit : 26.2 %  Platelet Count - Automated : 305 K/uL  Mean Cell Volume : 102.7 fl  Mean Cell Hemoglobin : 32.2 pg  Mean Cell Hemoglobin Concentration : 31.3 gm/dL  Auto Neutrophil # : x  Auto Lymphocyte # : x  Auto Monocyte # : x  Auto Eosinophil # : x  Auto Basophil # : x  Auto Neutrophil % : x  Auto Lymphocyte % : x  Auto Monocyte % : x  Auto Eosinophil % : x  Auto Basophil % : x    03-23    139  |  107  |  30<H>  ----------------------------<  102<H>  4.1   |  27  |  1.05    Ca    8.0<L>      23 Mar 2019 05:34              MEDICATIONS  (STANDING):  amoxicillin  875 milliGRAM(s)/clavulanate 1 Tablet(s) Oral every 12 hours  docusate sodium 100 milliGRAM(s) Oral three times a day  morphine ER Tablet 15 milliGRAM(s) Oral two times a day  senna 2 Tablet(s) Oral at bedtime  zinc oxide 40% Ointment 1 Application(s) Topical daily    MEDICATIONS  (PRN):  acetaminophen   Tablet .. 650 milliGRAM(s) Oral every 6 hours PRN Temp greater or equal to 38C (100.4F), Mild Pain (1 - 3)  acetaminophen   Tablet .. 650 milliGRAM(s) Oral every 6 hours PRN Mild Pain (1 - 3)  bisacodyl Suppository 10 milliGRAM(s) Rectal daily PRN Constipation  oxyCODONE    IR 5 milliGRAM(s) Oral every 4 hours PRN Moderate Pain (4 - 6)  polyethylene glycol 3350 17 Gram(s) Oral daily PRN Constipation        HPI:  88 y/o M PMHx significant for hypertension, hyperlipidemia, meningioma, recurrent mechanical falls, and cervical myelopathy/radiculopathy presents to the ED for further evaluation of an unwitnessed fall last night. In the ED the patient was found to have notable skin tears to his right upper arm. The patient notes that the fall occurred upon ambulating to the bathroom and was unable to get up on his own. Labs => WBC 12.09, Hgb/Hct 11.1/32.8, LA 1.6, Alb 2.6, BUN/Cr 64/1.80, , TnI (-) x 3, Bands 17%. In the ED the patient received NS x 1.5L. (10 Mar 2019 23:00)      ASSESSMENT/PLAN:    Continue tele monitoring  Continue to assess groin access site/ dry and intact at present

## 2019-03-25 PROCEDURE — 71260 CT THORAX DX C+: CPT | Mod: 26

## 2019-03-25 PROCEDURE — 74177 CT ABD & PELVIS W/CONTRAST: CPT | Mod: 26

## 2019-03-25 RX ORDER — HEPARIN SODIUM 5000 [USP'U]/ML
5000 INJECTION INTRAVENOUS; SUBCUTANEOUS EVERY 12 HOURS
Qty: 0 | Refills: 0 | Status: DISCONTINUED | OUTPATIENT
Start: 2019-03-25 | End: 2019-03-27

## 2019-03-25 RX ADMIN — Medication 1 TABLET(S): at 05:22

## 2019-03-25 RX ADMIN — ZINC OXIDE 1 APPLICATION(S): 200 OINTMENT TOPICAL at 11:48

## 2019-03-25 RX ADMIN — Medication 1 TABLET(S): at 17:51

## 2019-03-25 RX ADMIN — MORPHINE SULFATE 15 MILLIGRAM(S): 50 CAPSULE, EXTENDED RELEASE ORAL at 17:52

## 2019-03-25 RX ADMIN — HEPARIN SODIUM 5000 UNIT(S): 5000 INJECTION INTRAVENOUS; SUBCUTANEOUS at 17:50

## 2019-03-25 RX ADMIN — Medication 100 MILLIGRAM(S): at 05:22

## 2019-03-25 RX ADMIN — MORPHINE SULFATE 15 MILLIGRAM(S): 50 CAPSULE, EXTENDED RELEASE ORAL at 17:49

## 2019-03-25 NOTE — PROGRESS NOTE ADULT - SUBJECTIVE AND OBJECTIVE BOX
CC: Recurrent Falls (19 Mar 2019 17:27)    HPI:  88 y/o M PMHx significant for hypertension, hyperlipidemia, meningioma, recurrent mechanical falls, and cervical myelopathy/radiculopathy presents to the ED for further evaluation of an unwitnessed fall last night. In the ED the patient was found to have notable skin tears to his right upper arm. The patient notes that the fall occurred upon ambulating to the bathroom and was unable to get up on his own. Labs => WBC 12.09, Hgb/Hct 11.1/32.8, LA 1.6, Alb 2.6, BUN/Cr 64/1.80, , TnI (-) x 3, Bands 17%. In the ED the patient received NS x 1.5L. (10 Mar 2019 23:00)    INTERVAL HPI/ OVERNIGHT EVENTS:  Pt was seen and examined,  looks aggravated, states that had too many visitors this am,  refused Pt, no other complains. Results of Ct still pending       Vital Signs Last 24 Hrs  T(C): 36.9 (25 Mar 2019 17:22), Max: 37.3 (25 Mar 2019 04:46)  T(F): 98.4 (25 Mar 2019 17:22), Max: 99.2 (25 Mar 2019 04:46)  HR: 59 (25 Mar 2019 17:22) (59 - 65)  BP: 129/66 (25 Mar 2019 17:22) (123/64 - 129/66)  RR: 16 (25 Mar 2019 17:22) (16 - 18)  SpO2: 98% (25 Mar 2019 17:22) (98% - 99%)      REVIEW OF SYSTEMS:  All other review of systems is negative unless indicated above.      PHYSICAL EXAM:  General: Well developed; ; in no acute distress  Eyes: PERRLA, EOMI; conjunctiva and sclera clear  Head: Normocephalic; atraumatic  ENMT: No nasal discharge; airway clear  Neck: Supple; non tender; no masses  Respiratory: No wheezes, rales or rhonchi  Cardiovascular: Regular rate and rhythm. S1 and S2 Normal; No murmurs, gallops or rubs  Gastrointestinal: Soft non-tender non-distended; Normal bowel sounds. + umbilical hernia, reducible   Genitourinary: No suprapubic  tenderness. scrotal and posterior penile wound clean, + granulation tissues, dressing applied   Extremities: Normal range of motion, No edema  Vascular: Peripheral pulses palpable 2+ bilaterally. Groin dressing D/C/I   Neurological: Alert and oriented x4  Skin: Warm and dry. No acute rash  Lymph Nodes: No acute cervical adenopathy  Musculoskeletal: Normal muscle  tone, without deformities  Psychiatric: Cooperative and appropriate                LABS:                                       8.2    6.84  )-----------( 305      ( 23 Mar 2019 05:34 )             26.2     03-23    139  |  107  |  30<H>  ----------------------------<  102<H>  4.1   |  27  |  1.05    Ca    8.0<L>      23 Mar 2019 05:34                     8.2    6.84  )-----------( 305      ( 23 Mar 2019 05:34 )             26.2     03-23    139  |  107  |  30<H>  ----------------------------<  102<H>  4.1   |  27  |  1.05    Ca    8.0<L>      23 Mar 2019 05:34                              9.0    8.73  )-----------( 340      ( 21 Mar 2019 05:53 )             28.2     03-21    136  |  104  |  31<H>  ----------------------------<  96  4.4   |  29  |  1.16    Ca    8.1<L>      21 Mar 2019 05:53      PT/INR - ( 21 Mar 2019 05:53 )   PT: 13.2 sec;   INR: 1.18 ratio    PTT - ( 21 Mar 2019 05:53 )  PTT:30.2 sec                            9.0    8.73  )-----------( 340      ( 21 Mar 2019 05:53 )             28.2     03-21    136  |  104  |  31<H>  ----------------------------<  96  4.4   |  29  |  1.16    Ca    8.1<L>      21 Mar 2019 05:53    PT/INR - ( 21 Mar 2019 05:53 )   PT: 13.2 sec;   INR: 1.18 ratio      PTT - ( 21 Mar 2019 05:53 )  PTT:30.2 sec                 9.0    8.84  )-----------( 274      ( 18 Mar 2019 05:37 )             28.1     18 Mar 2019 05:37    137    |  107    |  31     ----------------------------<  106    4.5     |  26     |  1.03     Ca    7.9        18 Mar 2019 05:37    TPro  5.3    /  Alb  1.6    /  TBili  0.4    /  DBili  x      /  AST  22     /  ALT  24     /  AlkPhos  109    18 Mar 2019 05:37      LIVER FUNCTIONS - ( 18 Mar 2019 05:37 )  Alb: 1.6 g/dL / Pro: 5.3 gm/dL / ALK PHOS: 109 U/L / ALT: 24 U/L / AST: 22 U/L / GGT: x           Culture - Surgical Swab (03.12.19 @ 20:06)    Specimen Source: .Surgical Swab SCROTAL WOUND # 2    Culture Results:   Few Proteus mirabilis  Few Escherichia coli  See previous culture [id number]  Moderate Bacteroides fragilis "Susceptibilities not performed"    Culture - Surgical Swab (03.12.19 @ 20:06)    -  Imipenem: S <=1    -  Levofloxacin: I 4    -  Levofloxacin: S <=2    -  Meropenem: S <=1    -  Meropenem: S <=1    -  Piperacillin/Tazobactam: S <=16    -  Piperacillin/Tazobactam: S <=16    -  Tobramycin: S <=4    -  Tobramycin: S <=4    -  Trimethoprim/Sulfamethoxazole: S <=2/38    -  Trimethoprim/Sulfamethoxazole: S <=2/38    -  Amikacin: S <=16    -  Amikacin: S <=16    -  Ampicillin: S <=8 These ampicillin results predict results for amoxicillin    -  Ampicillin: S <=8 These ampicillin results predict results for amoxicillin    -  Ampicillin/Sulbactam: S <=8/4    -  Ampicillin/Sulbactam: S <=8/4    -  Aztreonam: R >16    -  Aztreonam: S <=4    -  Cefazolin: S <=8    -  Cefazolin: S <=8    -  Cefepime: S <=4    -  Cefepime: S <=4    -  Cefoxitin: S <=8    -  Cefoxitin: S <=8    -  Ceftriaxone: S <=1 Enterobacter, Citrobacter, and Serratia may develop resistance during prolonged therapy    -  Ceftriaxone: S <=1 Enterobacter, Citrobacter, and Serratia may develop resistance during prolonged therapy    -  Ciprofloxacin: S <=1    -  Ciprofloxacin: S <=1    -  Ertapenem: S <=1    -  Ertapenem: S <=1    -  Gentamicin: S <=4    -  Gentamicin: S <=4    Specimen Source: .Surgical Swab SCROTAL WOUND # 1    Culture Results:   Few Proteus mirabilis  Few Escherichia coli    Organism: Proteus mirabilis    Organism: Escherichia coli    Culture - Blood (03.10.19 @ 21:50)    -  Multidrug (KPC pos) resistant organism: Nondet    -  Staphylococcus aureus: Nondet    -  Methicillin resistant Staphylococcus aureus (MRSA): Nondet    -  Coagulase negative Staphylococcus: Nondet    -  Enterococcus species: Nondet    -  Vancomycin resistant Enterococcus sp.: Nondet    -  Escherichia coli: Nondet    -  Klebsiella oxytoca: Nondet    -  Klebsiella pneumoniae: Nondet    -  Serratia marcescens: Nondet    -  Haemophilus influenzae: Nondet    -  Listeria monocytogenes: Nondet    -  Neisseria meningitidis: Nondet    -  Pseudomonas aeruginosa: Nondet    -  Acinetobacter baumanii: Nondet    -  Enterobacter cloacae complex: Nondet    -  Streptococcus sp. (Not Grp A, B or S pneumoniae): Nondet    -  Streptococcus agalactiae (Group B): Nondet    -  Streptococcus pyogenes (Group A): Nondet    -  Streptococcus pneumoniae: Nondet    -  Candida albicans: Nondet    -  Candida glabrata: Nondet    -  Candida krusei: Nondet    -  Candida parapsilosis: Nondet    -  Candida tropicalis: Nondet    Gram Stain:   Growth in anaerobic bottle: Gram Negative Rods  Growth in anaerobic bottle: Bacteroides fragilis    Culture - Urine (03.10.19 @ 21:43)    -  Ampicillin: S <=8 These ampicillin results predict results for amoxicillin    -  Aztreonam: S <=4    -  Ampicillin/Sulbactam: S <=8/4    -  Amikacin: S <=16    -  Imipenem: S <=1    -  Gentamicin: S <=4    -  Ertapenem: S <=1    -  Ciprofloxacin: S <=1    -  Ceftriaxone: S <=1 Enterobacter, Citrobacter, and Serratia may develop resistance during prolonged therapy    -  Cefoxitin: S <=8    -  Cefepime: S <=4    -  Cefazolin: S <=8 For uncomplicated UTI with K. pneumoniae, E. coli, or P. mirablis: JANESSA <=16 is sensitive and JANESSA >=32 is resistant. This also predicts results for oral agents cefaclor, cefdinir, cefpodoxime, cefprozil, cefuroxime axetil, cephalexin and locarbef for uncomplicated UTI. Note that some isolates may be susceptible to these agents while testing resistant to cefazolin.    -  Trimethoprim/Sulfamethoxazole: S <=2/38    -  Tigecycline: S <=2    -  Tobramycin: S <=4    -  Nitrofurantoin: S <=32 Should not be used to treat pyelonephritis    -  Piperacillin/Tazobactam: S <=16    -  Meropenem: S <=1    -  Levofloxacin: S <=2    Specimen Source: .Urine Catheterized    Culture Results:   >100,000 CFU/ml Escherichia coli        MEDICATIONS  (STANDING):  amoxicillin  875 milliGRAM(s)/clavulanate 1 Tablet(s) Oral every 12 hours  docusate sodium 100 milliGRAM(s) Oral three times a day  morphine ER Tablet 15 milliGRAM(s) Oral two times a day  senna 2 Tablet(s) Oral at bedtime  zinc oxide 40% Ointment 1 Application(s) Topical daily    MEDICATIONS  (PRN):  acetaminophen   Tablet .. 650 milliGRAM(s) Oral every 6 hours PRN Temp greater or equal to 38C (100.4F), Mild Pain (1 - 3)  acetaminophen   Tablet .. 650 milliGRAM(s) Oral every 6 hours PRN Mild Pain (1 - 3)  bisacodyl Suppository 10 milliGRAM(s) Rectal daily PRN Constipation  oxyCODONE    IR 5 milliGRAM(s) Oral every 4 hours PRN Moderate Pain (4 - 6)  polyethylene glycol 3350 17 Gram(s) Oral daily PRN Constipation      RADIOLOGY & ADDITIONAL TESTS:  EXAM:  CT PELVIS ONLY                        PROCEDURE DATE:  03/12/2019      COMPARISON: None    FINDINGS: There is extensive soft tissue gas in the left scrotum. Gas   extends superiorly to the base of the penis. There is no gas in the right   scrotum. Small bilateral hydroceles are noted as described on recent   ultrasound. There is a left groin hernia containing nonobstructed sigmoid   colon. There is a radiodense linear foreign object within the sigmoid   colon located within the hernia.    Partially visualized mass surrounds the distal aorta. The visualized   aorta and iliac arteries are normal in caliber. Colonic diverticulosis   noted without diverticulitis. Moderate sized umbilical hernia contains   nonobstructed small intestine. There is diffuse anasarca. No acute bony   abnormality is identified.    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  Left groin hernia containing nonobstructed sigmoid colon.  Partially visualized soft tissue mass surrounding the aorta suspicious   for metastatic lymphadenopathy.      EXAM:  US DPLX UPR EXT VEINS LTD RT                        PROCEDURE DATE:  03/18/2019    FINDINGS:    The right internal jugular, subclavian, axillary, brachial, basilic and   cephalic veins are patent and compressible where applicable.   Doppler examination shows normal spontaneous and phasic flow.  IMPRESSION:   No evidence of right upper extremity deep venous thrombosis.         EXAM:  ECHO TTE WO CON COMP W DOP    PROCEDURE DATE:  03/18/2019       Summary   Normal appearing mitral valve structure.   Mild (1+) mitral regurgitation is present.   Fibrocalcific changes noted to the Aortic valve leaflets with preserved   leaflet excursion.   Normal appearing tricuspid valve structure.   Mild (1+) tricuspid valve regurgitation is present.   Pulmonic valve not well seen.   Trace pulmonic valvular regurgitation is present.   The left atrium is severely dilated.   Estimated left ventricular ejection fraction is 55-60 %.   The left ventricle is normal in size, wall thickness, wall motion and   contractility as seen in limited views.   Normal appearing right atrium.   Normal appearing right ventricle structure and function.   All visualized extra cardiac structures appears to be normal.   No evidence of pericardial effusion.      < from: CT Chest w/ IV Cont (03.25.19 @ 11:35) >  EXAM:  CT CHEST IC                          EXAM:  CT ABDOMEN AND PELVIS IC                            PROCEDURE DATE:  03/25/2019          INTERPRETATION:  Clinical information: 89-year-old man with abdominal   mass surrounding the aorta. Possiblemetastatic disease.    COMPARISON: CT pelvis March 12, 2019    PROCEDURE:   CT of the Chest, Abdomen and Pelvis was performed with intravenous   contrast.   Intravenous contrast: 90 ml Omnipaque 350. 10 ml discarded.  Oral contrast: None.  Sagittal and coronal reformats were performed.    FINDINGS:    CHEST:     LOWER NECK: Within normal limits.  AXILLA, MEDIASTINUM AND JESSICA: No lymphadenopathy.  VESSELS: Atherosclerotic arterial calcifications, including the coronary   arteries.  HEART: The heart is enlarged.No pericardial effusion.  PLEURA: Moderate bilateral pleural effusions.  LUNGS AND LARGE AIRWAYS: Passive atelectasis in both lower lobes and   areas of subsegmental atelectasis in both upper lobes. Dependent opacity   in the right mainstem bronchus is likely representing secretions.  CHEST WALL:  Unremarkable    ABDOMEN AND PELVIS:    LIVER: Within normal limits.  SPLEEN: Within normal limits.  PANCREAS: Within normal limits.  GALLBLADDER: Multiple gallstones.  BILE DUCTS: Normal caliber.  ADRENALS: Within normal limits.  KIDNEYS/URETERS: 3.5 cm infiltrative low-density mass in the left kidney.    No hydronephrosis.    RETROPERITONEUM: Confluent soft tissue mass encasing the aorta measuring   7.4 x 4.5 cm, unchanged from prior CT scan.    VESSELS:  Atherosclerotic arterial calcifications.  Normal caliber aorta.    BOWEL: No bowel obstruction, wall thickening or inflammatory change.   Appendix normal. Large amount of stool throughout the colon. Colonic   diverticulosis, without diverticulitis.  PERITONEUM: No ascites or pneumoperitoneum.    REPRODUCTIVE ORGANS: Enlarged prostate.  BLADDER: Jay catheter balloon in the bladder lumen.    ABDOMINAL WALL: Large left groin hernia containing nonobstructed sigmoid   colon. Midline ventral abdominal wall hernia containing nonobstructed   small intestine.  BONES: No acute abnormality. Multiple old bilateral rib fractures.   Degenerative changes in the left shoulder, spine and bilateral hips.    IMPRESSION: Chest: Moderate bilateral pleural effusions. No evidence of   malignancy.  Abdomen/pelvis: Retroperitoneal soft tissue mass unchanged from prior CT   scan.  Infiltrate on mass in the left kidney. Constellation of findings is   suggestive of lymphoma.

## 2019-03-25 NOTE — PROGRESS NOTE ADULT - ASSESSMENT
88 y/o M PMHx significant for hypertension, hyperlipidemia, meningioma, recurrent mechanical falls, and cervical myelopathy/radiculopathy  admitted for:     1) S/p Fall  - mechanical vs syncope? possibly due to bradycardia   - found to be septic   - telemetry: AFIB with bradycardia and pauses   - EP eval appreciated S/p MICRA placement.   - TSH elevated, T3/T4: WNL, likely sick euthyroid vs subclinical, will need repeat labs in few weeks   - fall precautions  - PT   - EP eval appreciated has alma rosa for 4/5 for wound check, cleared for bb if  HR elevated         2)  Sepsis with bacteroides in Blood,  Cellulitis with proteus/E coli/ Bacteroides, E.Coli UTI.  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, scrotum and penile shaft pod #10   improving   C/w local wound care as per Urology orders   s/p IV Zosyn, changed  to PO  Augmentin   C/w Jay   Discussed  with Plastic Sx, to OR on Wednesday for skin graft   Urology  f/u appreciated        3) JAMIE on CKD, improved   -  pre-renal azotemia vs ATN  - S/p  IV hydration with Improvement of renal Fx  - Encourage oral hydration   - hold Furosemide and ACEI/ARB  - Monitor UO   - repeat labs in am after IV contrast       4)HTN  BP improved, low normal now   Cont Hold meds:  was on  Amlodipine 10mg po daily  Lisinopril 40mg po daily   Metoprolol XL 100mg po daily      5) Severe Protein calorie Malnutrition. Hypoalbuminemia    encourage oral intake, supplements       6) Peripheral edema  likely due to IV hydration and hypoalbuminemia   Doppler neg for DVT  Elevate extremities  Edema improved       7) Elevated TSH  will check T3/T4: WNL  Repeat labs in few weeks     8) AFIB with slow ventricular response  New DX?   Metoprolol held   [id number] score: 5, high risk   CArdio eval appreciated, no recommendation for A/c, will need to follow up     9) Soft tissue mass surrounding proximal aorta, suspicious for metastatic lymphadenopathy/Lymphoma   incidental finding on CT   No h/o malignancy as per Pt, but not following with PCP   CT abd/pelvis with IV contrast  done and confirms mass and also + infiltrative L kidney mass, suspicious for Lymphoma  D/w DR thakur will follow up for further recommendations       10) DVT PPxs         Dispo: c/w current care, d/c telemetry

## 2019-03-25 NOTE — PROGRESS NOTE ADULT - SUBJECTIVE AND OBJECTIVE BOX
UROLOGY FOLLOW UP NOTE    SUBJECTIVE    Patient seen and examined at bedside.  No acute events overnight.    Current complaints are: none        OBJECTIVE    T(C): 36.7 (03-25-19 @ 11:54), Max: 37.3 (03-25-19 @ 04:46)  HR: 65 (03-25-19 @ 11:54)  BP: 124/61 (03-25-19 @ 11:54)  RR: 18 (03-25-19 @ 11:54)  SpO2: 99% (03-25-19 @ 11:54)    03-24-19 @ 07:01  -  03-25-19 @ 07:00  --------------------------------------------------------  IN: 0 mL / OUT: 1420 mL / NET: -1420 mL    Gen: elderly Male in NAD, well nourished  HEENT: normocephalic, hearing intact, moist mucus membranes  Chest: non labored breathing  ABd: soft, NT, ND, no masses  Back: no CVA tenderness  : zhou draining clear urine, no suprapubic distension or tenderness, genital wound looks healthy without evidence of ischemia or drainage, zhou draining clear urine  Psych: normal affect and mood    Wet to dry saline dressing reapplied by author      acetaminophen   Tablet .. 650 milliGRAM(s) Oral every 6 hours PRN  acetaminophen   Tablet .. 650 milliGRAM(s) Oral every 6 hours PRN  amoxicillin  875 milliGRAM(s)/clavulanate 1 Tablet(s) Oral every 12 hours  bisacodyl Suppository 10 milliGRAM(s) Rectal daily PRN  docusate sodium 100 milliGRAM(s) Oral three times a day  heparin  Injectable 5000 Unit(s) SubCutaneous every 12 hours  morphine ER Tablet 15 milliGRAM(s) Oral two times a day  polyethylene glycol 3350 17 Gram(s) Oral daily PRN  senna 2 Tablet(s) Oral at bedtime  zinc oxide 40% Ointment 1 Application(s) Topical daily      ASSESSMENT & PLAN    1. Teetee's gangrene POD#10 after debridement  - No need for further debridement at this time  - continue with daily wet to dry  - Plastic surgery reconstruction 3/27/19                Thank you for allowing me to participate in the care of this patient  Please call with questions or concerns    Tootie Chamorro MD    [facility]  [phone]

## 2019-03-25 NOTE — PROGRESS NOTE ADULT - SUBJECTIVE AND OBJECTIVE BOX
Date of service: 03-25-19 @ 11:47    Alert and verbal  No new complaints  No diarrhea    ROS: no fever or chills; denies dizziness, no HA, no SOB or cough, no abdominal pain, no dysuria, no legs pain, no rashes    MEDICATIONS  (STANDING):  amoxicillin  875 milliGRAM(s)/clavulanate 1 Tablet(s) Oral every 12 hours  docusate sodium 100 milliGRAM(s) Oral three times a day  morphine ER Tablet 15 milliGRAM(s) Oral two times a day  senna 2 Tablet(s) Oral at bedtime  zinc oxide 40% Ointment 1 Application(s) Topical daily      Vital Signs Last 24 Hrs  T(C): 37.3 (25 Mar 2019 04:46), Max: 37.3 (25 Mar 2019 04:46)  T(F): 99.2 (25 Mar 2019 04:46), Max: 99.2 (25 Mar 2019 04:46)  HR: 59 (25 Mar 2019 04:46) (59 - 60)  BP: 123/64 (25 Mar 2019 04:46) (123/64 - 130/66)  BP(mean): --  RR: 18 (24 Mar 2019 17:38) (18 - 18)  SpO2: 99% (25 Mar 2019 04:46) (99% - 99%)    Physical Exam:      Constitutional: frail looking  HEENT: NC/AT, EOMI, PERRLA, conjunctivae clear  Neck: supple; thyroid not palpable  Back: no tenderness  Respiratory: respiratory effort normal; decreased BS at bases  Cardiovascular: S1S2 regular, no murmurs  Abdomen: soft, not tender, not distended, positive BS  Genitourinary: no suprapubic tenderness; scrotal edema and erythema and open wound s/p surgery - clean  Lymphatic: no LN palpable  Musculoskeletal: no muscle tenderness, no joint swelling or tenderness  Extremities: no pedal edema  Neurological/ Psychiatric: AxOx3, moving all extremities  Skin: no rashes; no palpable lesions    Labs: reviewed                        9.0    8.73  )-----------( 340      ( 21 Mar 2019 05:53 )             28.2     03-21    136  |  104  |  31<H>  ----------------------------<  96  4.4   |  29  |  1.16    Ca    8.1<L>      21 Mar 2019 05:53               9.7    9.38  )-----------( 166      ( 14 Mar 2019 05:57 )             30.0     03-14    141  |  111<H>  |  53<H>  ----------------------------<  141<H>  4.1   |  24  |  1.28    Ca    8.2<L>      14 Mar 2019 05:57    TPro  5.2<L>  /  Alb  1.7<L>  /  TBili  0.6  /  DBili  x   /  AST  33  /  ALT  25  /  AlkPhos  102  03-14    Vancomycin Level, Trough: 9.5 ug/mL (03-14 @ 05:57)      03-12    143  |  111<H>  |  64<H>  ----------------------------<  141<H>  3.7   |  22  |  1.76<H>    Culture - Surgical Swab (03.12.19 @ 20:06)    -  Levofloxacin: I 4    -  Levofloxacin: S <=2    -  Tobramycin: S <=4    -  Tobramycin: S <=4    -  Trimethoprim/Sulfamethoxazole: S <=2/38    -  Trimethoprim/Sulfamethoxazole: S <=2/38    -  Amikacin: S <=16    -  Amikacin: S <=16    -  Ampicillin: S <=8 These ampicillin results predict results for amoxicillin    -  Ampicillin: S <=8 These ampicillin results predict results for amoxicillin    -  Ampicillin/Sulbactam: S <=8/4    -  Ampicillin/Sulbactam: S <=8/4    -  Aztreonam: R >16    -  Aztreonam: S <=4    -  Cefazolin: S <=8    -  Cefazolin: S <=8    -  Cefepime: S <=4    -  Cefepime: S <=4    -  Cefoxitin: S <=8    -  Cefoxitin: S <=8    -  Ceftriaxone: S <=1 Enterobacter, Citrobacter, and Serratia may develop resistance during prolonged therapy    -  Ceftriaxone: S <=1 Enterobacter, Citrobacter, and Serratia may develop resistance during prolonged therapy    -  Ciprofloxacin: S <=1    -  Ciprofloxacin: S <=1    -  Ertapenem: S <=1    -  Ertapenem: S <=1    -  Gentamicin: S <=4    -  Gentamicin: S <=4    -  Imipenem: S <=1    -  Meropenem: S <=1    -  Meropenem: S <=1    -  Piperacillin/Tazobactam: S <=16    -  Piperacillin/Tazobactam: S <=16    Specimen Source: .Surgical Swab SCROTAL WOUND # 1    Culture Results:   Few Proteus mirabilis  Few Escherichia coli  Moderate Bacteroides thetaiotaomicron "Susceptibilities not performed"    Organism Identification: Proteus mirabilis  Escherichia coli    Organism: Proteus mirabilis    Organism: Escherichia coli    Method Type: JANESSA    Method Type: JANESSA      Ca    8.2<L>      12 Mar 2019 06:35    Culture - Blood (collected 10 Mar 2019 21:50)  Source: .Blood None  Preliminary Report (12 Mar 2019 01:02):    No growth to date.    Culture - Blood (collected 10 Mar 2019 21:50)  Source: .Blood None  Gram Stain (12 Mar 2019 17:26):    Growth in anaerobic bottle: Gram Negative Rods  Final Report (13 Mar 2019 19:21):    Growth in anaerobic bottle: Bacteroides fragilis    "Susceptibilities not performed"      Culture - Urine (collected 10 Mar 2019 21:43)  Source: .Urine Catheterized  Final Report (12 Mar 2019 19:14):    >100,000 CFU/ml Escherichia coli  Organism: Escherichia coli (12 Mar 2019 19:14)  Organism: Escherichia coli (12 Mar 2019 19:14)      -  Amikacin: S <=16      -  Ampicillin: S <=8 These ampicillin results predict results for amoxicillin      -  Ampicillin/Sulbactam: S <=8/4      -  Aztreonam: S <=4      -  Cefazolin: S <=8 For uncomplicated UTI with K. pneumoniae, E. coli, or P. mirablis: JANESSA <=16 is sensitive and JANESSA >=32 is resistant. This also predicts results for oral agents cefaclor, cefdinir, cefpodoxime, cefprozil, cefuroxime axetil, cephalexin and locarbef for uncomplicated UTI. Note that some isolates may be susceptible to these agents while testing resistant to cefazolin.      -  Cefepime: S <=4      -  Cefoxitin: S <=8      -  Ceftriaxone: S <=1 Enterobacter, Citrobacter, and Serratia may develop resistance during prolonged therapy      -  Ciprofloxacin: S <=1      -  Ertapenem: S <=1      -  Gentamicin: S <=4      -  Imipenem: S <=1      -  Levofloxacin: S <=2      -  Meropenem: S <=1      -  Nitrofurantoin: S <=32 Should not be used to treat pyelonephritis      -  Piperacillin/Tazobactam: S <=16      -  Tigecycline: S <=2      -  Tobramycin: S <=4      -  Trimethoprim/Sulfamethoxazole: S <=2/38      Method Type: JANESSA        Radiology: all available radiological tests reviewed    < from: CT Pelvis No Cont (03.12.19 @ 13:36) >  Extensive soft tissue gas in the left scrotum suspicious for Teetee's   gangrene.  Left groin hernia containing nonobstructed sigmoid colon.  Partially visualized soft tissue mass surrounding the aorta suspicious   for metastatic lymphadenopathy.    < end of copied text >    < from: US Testicles (03.12.19 @ 09:46) >  Diffuse scrotal wall emphysema suspicious for gas-forming infection.  Small complex left hydrocele.  Unremarkable testes.    < end of copied text >        Advanced directives addressed: full resuscitation

## 2019-03-25 NOTE — PROGRESS NOTE ADULT - ASSESSMENT
89 y/o Male with h/o hypertension, hyperlipidemia, meningioma, recurrent mechanical falls, cervical myelopathy/radiculopathy was admitted on 3/12 for increased weakness and unwitnessed fall. In the ED the patient was found to have skin tears to his right upper arm. The patient notes that the fall occurred upon ambulating to the bathroom and was unable to get up on his own. In the ED the patient received zosyn and vancomycin IV.    1. 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 S/p sepsis with bacteroides fragilis. CRF stage 3.   -noted with soft tissue mass surrounding aorta ?lymphadenopathy  -improved  -wound cultures reviewed  -s/p zosyn 3.375 gm IV q8h # 10 days  -on augmentin PO # 3  -tolerating abx well so far; no side effects noted  -renal function is improved  -urology evaluation appreciated  -continue oral abx for a few more days  -may need repeat CT abdomen and pelvis to assess periaortic soft tissue mass  -plastics reconstruction surgery was postponed   -local wound care  -monitor temps  -f/u CBC  -supportive care  2. Other issues:   -care per medicine

## 2019-03-26 LAB
ANION GAP SERPL CALC-SCNC: 1 MMOL/L — LOW (ref 5–17)
BUN SERPL-MCNC: 26 MG/DL — HIGH (ref 7–23)
CALCIUM SERPL-MCNC: 8.6 MG/DL — SIGNIFICANT CHANGE UP (ref 8.5–10.1)
CHLORIDE SERPL-SCNC: 104 MMOL/L — SIGNIFICANT CHANGE UP (ref 96–108)
CO2 SERPL-SCNC: 32 MMOL/L — HIGH (ref 22–31)
CREAT SERPL-MCNC: 0.83 MG/DL — SIGNIFICANT CHANGE UP (ref 0.5–1.3)
GLUCOSE SERPL-MCNC: 112 MG/DL — HIGH (ref 70–99)
HCT VFR BLD CALC: 28.7 % — LOW (ref 39–50)
HGB BLD-MCNC: 9.2 G/DL — LOW (ref 13–17)
MCHC RBC-ENTMCNC: 32.1 GM/DL — SIGNIFICANT CHANGE UP (ref 32–36)
MCHC RBC-ENTMCNC: 33 PG — SIGNIFICANT CHANGE UP (ref 27–34)
MCV RBC AUTO: 102.9 FL — HIGH (ref 80–100)
NRBC # BLD: 0 /100 WBCS — SIGNIFICANT CHANGE UP (ref 0–0)
PLATELET # BLD AUTO: 268 K/UL — SIGNIFICANT CHANGE UP (ref 150–400)
POTASSIUM SERPL-MCNC: 4.4 MMOL/L — SIGNIFICANT CHANGE UP (ref 3.5–5.3)
POTASSIUM SERPL-SCNC: 4.4 MMOL/L — SIGNIFICANT CHANGE UP (ref 3.5–5.3)
RBC # BLD: 2.79 M/UL — LOW (ref 4.2–5.8)
RBC # FLD: 14.6 % — HIGH (ref 10.3–14.5)
SODIUM SERPL-SCNC: 137 MMOL/L — SIGNIFICANT CHANGE UP (ref 135–145)
WBC # BLD: 5.53 K/UL — SIGNIFICANT CHANGE UP (ref 3.8–10.5)
WBC # FLD AUTO: 5.53 K/UL — SIGNIFICANT CHANGE UP (ref 3.8–10.5)

## 2019-03-26 PROCEDURE — 99232 SBSQ HOSP IP/OBS MODERATE 35: CPT

## 2019-03-26 RX ORDER — SODIUM CHLORIDE 9 MG/ML
1000 INJECTION, SOLUTION INTRAVENOUS
Qty: 0 | Refills: 0 | Status: DISCONTINUED | OUTPATIENT
Start: 2019-03-26 | End: 2019-03-27

## 2019-03-26 RX ADMIN — Medication 1 TABLET(S): at 05:30

## 2019-03-26 RX ADMIN — SODIUM CHLORIDE 75 MILLILITER(S): 9 INJECTION, SOLUTION INTRAVENOUS at 17:41

## 2019-03-26 RX ADMIN — Medication 100 MILLIGRAM(S): at 21:52

## 2019-03-26 RX ADMIN — HEPARIN SODIUM 5000 UNIT(S): 5000 INJECTION INTRAVENOUS; SUBCUTANEOUS at 05:30

## 2019-03-26 RX ADMIN — HEPARIN SODIUM 5000 UNIT(S): 5000 INJECTION INTRAVENOUS; SUBCUTANEOUS at 17:41

## 2019-03-26 RX ADMIN — Medication 1 TABLET(S): at 17:42

## 2019-03-26 RX ADMIN — SENNA PLUS 2 TABLET(S): 8.6 TABLET ORAL at 21:52

## 2019-03-26 NOTE — PROGRESS NOTE ADULT - ASSESSMENT
88 y/o male with multiple medical problems admitted s/p fall/ cellulitis/ bacteroides sepsis, Teetee's gangrene s/p debridement.  Incidental finding of periaortic soft tissue mass and left renal mass on CT .  Appears asymptomatic form the mass.   Concern for underlying malignancy- radiologically lymphoma is a possibility.    Discussed with Dr Gurvinder MAGANA- periaortic mass accessible to CT guided biopsy.  Recommend : CT guided biopsy later this week.

## 2019-03-26 NOTE — PROGRESS NOTE ADULT - SUBJECTIVE AND OBJECTIVE BOX
88 y/o male admitted s/p  fall. Multiple medical problems.  Found to have BActeroides sepsis, cellulitis, Teetee's gangrene s/p debridement, s/p iv antibiotics.    CT scan of abdomen and pelvis showed incidental finding of periaortic sift tissue mass and left renal mass.  He was evaluated by  hematology Dr Mohan - any w/up on hold due to active medical issues/ infection.  Now improving.  Scheduled for skin graft tomorrow.    Appear comfortable. has no complaints    Vital Signs Last 24 Hrs  T(C): 36.6 (26 Mar 2019 05:19), Max: 36.9 (25 Mar 2019 17:22)  T(F): 97.8 (26 Mar 2019 05:19), Max: 98.4 (25 Mar 2019 17:22)  HR: 60 (26 Mar 2019 05:19) (59 - 65)  BP: 117/63 (26 Mar 2019 05:19) (116/66 - 129/66)  BP(mean): --  RR: 17 (26 Mar 2019 05:19) (16 - 18)  SpO2: 98% (26 Mar 2019 05:19) (98% - 100%)    Constitutional: NAD, awake and alert.HEENT: PERR, EOMI, Normal Hearing, MMM  Neck: Soft and supple, No LAD, No JVD  Respiratory: Breath sounds are clear bilaterally.  Heart :  slightly irregular rate and rhythm,  Abdomen soft NT.                            9.2    5.53  )-----------( 268      ( 26 Mar 2019 05:40 )             28.7     03-26    137  |  104  |  26<H>  ----------------------------<  112<H>  4.4   |  32<H>  |  0.83    Ca    8.6      26 Mar 2019 05:40    MEDICATIONS  (STANDING):  amoxicillin  875 milliGRAM(s)/clavulanate 1 Tablet(s) Oral every 12 hours  docusate sodium 100 milliGRAM(s) Oral three times a day  heparin  Injectable 5000 Unit(s) SubCutaneous every 12 hours  senna 2 Tablet(s) Oral at bedtime    MEDICATIONS  (PRN):  acetaminophen   Tablet .. 650 milliGRAM(s) Oral every 6 hours PRN Temp greater or equal to 38C (100.4F), Mild Pain (1 - 3)  acetaminophen   Tablet .. 650 milliGRAM(s) Oral every 6 hours PRN Mild Pain (1 - 3)  bisacodyl Suppository 10 milliGRAM(s) Rectal daily PRN Constipation  polyethylene glycol 3350 17 Gram(s) Oral daily PRN Constipation

## 2019-03-26 NOTE — PROGRESS NOTE ADULT - SUBJECTIVE AND OBJECTIVE BOX
CHIEF COMPLAINT/Diagnosis: fall/ r/o syncope/ bradycardia/afib    SUBJECTIVE: no complaints    REVIEW OF SYSTEMS:    CONSTITUTIONAL: No weakness, fevers or chills  EYES/ENT: No visual changes;  No vertigo or throat pain   NECK: No pain or stiffness  RESPIRATORY: No cough, wheezing, hemoptysis; No shortness of breath  CARDIOVASCULAR: No chest pain or palpitations  GASTROINTESTINAL: No abdominal or epigastric pain. No nausea, vomiting, or hematemesis; No diarrhea or constipation. No melena or hematochezia.  GENITOURINARY: No dysuria, frequency or hematuria  NEUROLOGICAL: No numbness or weakness  SKIN: No itching, burning, rashes, or lesions   All other review of systems is negative unless indicated above    Vital Signs Last 24 Hrs  T(C): 36.6 (26 Mar 2019 05:19), Max: 36.9 (25 Mar 2019 17:22)  T(F): 97.8 (26 Mar 2019 05:19), Max: 98.4 (25 Mar 2019 17:22)  HR: 60 (26 Mar 2019 05:19) (59 - 65)  BP: 117/63 (26 Mar 2019 05:19) (116/66 - 129/66)  BP(mean): --  RR: 17 (26 Mar 2019 05:19) (16 - 18)  SpO2: 98% (26 Mar 2019 05:19) (98% - 100%)    I&O's Summary    25 Mar 2019 07:01  -  26 Mar 2019 07:00  --------------------------------------------------------  IN: 0 mL / OUT: 1400 mL / NET: -1400 mL        CAPILLARY BLOOD GLUCOSE          PHYSICAL EXAM:    Constitutional: NAD, awake and alert, well-developed  HEENT: PERR, EOMI, Normal Hearing, MMM  Neck: Soft and supple, No LAD, No JVD  Respiratory: Breath sounds are clear bilaterally, No wheezing, rales or rhonchi  Cardiovascular: S1 and S2, regular rate and rhythm, no Murmurs, gallops or rubs  Gastrointestinal: Bowel Sounds present, soft, nontender, nondistended, no guarding, no rebound  Extremities: No peripheral edema  Vascular: 2+ peripheral pulses  Neurological: A/O x 3, no focal deficits  Musculoskeletal: 5/5 strength b/l upper and lower extremities  Skin: No rashes    MEDICATIONS:  MEDICATIONS  (STANDING):  amoxicillin  875 milliGRAM(s)/clavulanate 1 Tablet(s) Oral every 12 hours  docusate sodium 100 milliGRAM(s) Oral three times a day  heparin  Injectable 5000 Unit(s) SubCutaneous every 12 hours  senna 2 Tablet(s) Oral at bedtime  zinc oxide 40% Ointment 1 Application(s) Topical daily      LABS: All Labs Reviewed:                        9.2    5.53  )-----------( 268      ( 26 Mar 2019 05:40 )             28.7     03-26    137  |  104  |  26<H>  ----------------------------<  112<H>  4.4   |  32<H>  |  0.83    Ca    8.6      26 Mar 2019 05:40              Assessment and Plan:     89 y/o M PMHx significant for hypertension, hyperlipidemia, meningioma, recurrent mechanical falls, and cervical myelopathy/radiculopathy  admitted for:     1) S/p Fall  - mechanical vs syncope? possibly due to bradycardia   - found to be septic   - telemetry: AFIB with bradycardia and pauses   - EP eval appreciated S/p MICRA placement.   - TSH elevated, T3/T4: WNL, likely sick euthyroid vs subclinical, will need repeat labs in few weeks   - fall precautions  - PT   - EP eval appreciated has alma rosa for 4/5 for wound check, cleared for bb if  HR elevated         2)  Sepsis with bacteroides in Blood,  Cellulitis with proteus/E coli/ Bacteroides, E.Coli UTI.  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, scrotum and penile shaft pod #10   improving   C/w local wound care as per Urology orders   s/p IV Zosyn, changed  to PO  Augmentin   C/w Jay   Discussed  with Plastic Sx, to OR on Wednesday for skin graft   Urology  f/u appreciated        3) JAMIE on CKD, improved   -  pre-renal azotemia vs ATN  - S/p  IV hydration with Improvement of renal Fx  - Encourage oral hydration   - hold Furosemide and ACEI/ARB  - Monitor UO   - repeat labs in am after IV contrast       4)HTN  BP improved, low normal now   Cont Hold meds:  was on  Amlodipine 10mg po daily  Lisinopril 40mg po daily   Metoprolol XL 100mg po daily      5) Severe Protein calorie Malnutrition. Hypoalbuminemia    encourage oral intake, supplements       6) Peripheral edema  likely due to IV hydration and hypoalbuminemia   Doppler neg for DVT  Elevate extremities  Edema improved       7) Elevated TSH  will check T3/T4: WNL  Repeat labs in few weeks     8) AFIB with slow ventricular response  New DX?   Metoprolol held   [id number] score: 5, high risk   CArdio eval appreciated, no recommendation for A/c, will need to follow up     9) Soft tissue mass surrounding proximal aorta, suspicious for metastatic lymphadenopathy/Lymphoma   incidental finding on CT   No h/o malignancy as per Pt, but not following with PCP   CT abd/pelvis with IV contrast  done and confirms mass and also + infiltrative L kidney mass, suspicious for Lymphoma  D/w DR thakur will follow up for further recommendations       10) DVT PPxs

## 2019-03-27 RX ORDER — MORPHINE SULFATE 50 MG/1
2 CAPSULE, EXTENDED RELEASE ORAL EVERY 4 HOURS
Qty: 0 | Refills: 0 | Status: DISCONTINUED | OUTPATIENT
Start: 2019-03-27 | End: 2019-03-27

## 2019-03-27 RX ORDER — ENOXAPARIN SODIUM 100 MG/ML
40 INJECTION SUBCUTANEOUS DAILY
Qty: 0 | Refills: 0 | Status: DISCONTINUED | OUTPATIENT
Start: 2019-03-27 | End: 2019-03-31

## 2019-03-27 RX ORDER — DOCUSATE SODIUM 100 MG
100 CAPSULE ORAL THREE TIMES A DAY
Qty: 0 | Refills: 0 | Status: DISCONTINUED | OUTPATIENT
Start: 2019-03-27 | End: 2019-03-31

## 2019-03-27 RX ORDER — OXYCODONE HYDROCHLORIDE 5 MG/1
5 TABLET ORAL ONCE
Qty: 0 | Refills: 0 | Status: DISCONTINUED | OUTPATIENT
Start: 2019-03-27 | End: 2019-03-27

## 2019-03-27 RX ORDER — SODIUM CHLORIDE 9 MG/ML
1000 INJECTION, SOLUTION INTRAVENOUS
Qty: 0 | Refills: 0 | Status: DISCONTINUED | OUTPATIENT
Start: 2019-03-27 | End: 2019-03-27

## 2019-03-27 RX ORDER — SODIUM CHLORIDE 9 MG/ML
1000 INJECTION, SOLUTION INTRAVENOUS
Qty: 0 | Refills: 0 | Status: DISCONTINUED | OUTPATIENT
Start: 2019-03-27 | End: 2019-03-31

## 2019-03-27 RX ORDER — FENTANYL CITRATE 50 UG/ML
25 INJECTION INTRAVENOUS
Qty: 0 | Refills: 0 | Status: DISCONTINUED | OUTPATIENT
Start: 2019-03-27 | End: 2019-03-27

## 2019-03-27 RX ORDER — OXYCODONE AND ACETAMINOPHEN 5; 325 MG/1; MG/1
1 TABLET ORAL EVERY 4 HOURS
Qty: 0 | Refills: 0 | Status: DISCONTINUED | OUTPATIENT
Start: 2019-03-27 | End: 2019-04-03

## 2019-03-27 RX ORDER — ONDANSETRON 8 MG/1
4 TABLET, FILM COATED ORAL ONCE
Qty: 0 | Refills: 0 | Status: DISCONTINUED | OUTPATIENT
Start: 2019-03-27 | End: 2019-03-27

## 2019-03-27 RX ORDER — ACETAMINOPHEN 500 MG
650 TABLET ORAL EVERY 6 HOURS
Qty: 0 | Refills: 0 | Status: DISCONTINUED | OUTPATIENT
Start: 2019-03-27 | End: 2019-04-09

## 2019-03-27 RX ADMIN — Medication 100 MILLIGRAM(S): at 14:21

## 2019-03-27 RX ADMIN — Medication 100 MILLIGRAM(S): at 06:13

## 2019-03-27 RX ADMIN — OXYCODONE AND ACETAMINOPHEN 1 TABLET(S): 5; 325 TABLET ORAL at 16:27

## 2019-03-27 RX ADMIN — Medication 1 TABLET(S): at 06:13

## 2019-03-27 RX ADMIN — SODIUM CHLORIDE 75 MILLILITER(S): 9 INJECTION, SOLUTION INTRAVENOUS at 06:51

## 2019-03-27 RX ADMIN — Medication 100 MILLIGRAM(S): at 16:24

## 2019-03-27 RX ADMIN — Medication 100 MILLIGRAM(S): at 21:53

## 2019-03-27 RX ADMIN — Medication 100 MILLIGRAM(S): at 23:32

## 2019-03-27 RX ADMIN — OXYCODONE AND ACETAMINOPHEN 1 TABLET(S): 5; 325 TABLET ORAL at 16:57

## 2019-03-27 NOTE — PROGRESS NOTE ADULT - SUBJECTIVE AND OBJECTIVE BOX
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 No nausea, vomiting, or hematemesis; No diarrhea or constipation. No melena or hematochezia.  GENITOURINARY: No dysuria, frequency or hematuria  NEUROLOGICAL: No numbness or weakness  SKIN: No itching, burning, rashes, or lesions   All other review of systems is negative unless indicated above    Vital Signs Last 24 Hrs  T(C): 36.6 (27 Mar 2019 12:07), Max: 37 (26 Mar 2019 17:45)  T(F): 97.9 (27 Mar 2019 12:07), Max: 98.6 (26 Mar 2019 17:45)  HR: 62 (27 Mar 2019 12:07) (55 - 94)  BP: 103/54 (27 Mar 2019 12:07) (100/52 - 120/54)  BP(mean): --  RR: 16 (27 Mar 2019 12:07) (15 - 17)  SpO2: 98% (27 Mar 2019 12:07) (96% - 98%)    I&O's Summary    26 Mar 2019 07:01  -  27 Mar 2019 07:00  --------------------------------------------------------  IN: 850 mL / OUT: 900 mL / NET: -50 mL        CAPILLARY BLOOD GLUCOSE          PHYSICAL EXAM:    Constitutional: NAD, awake and alert, well-developed  HEENT: PERR, EOMI, Normal Hearing, MMM  Neck: Soft and supple, No LAD, No JVD  Respiratory: Breath sounds are clear bilaterally, No wheezing, rales or rhonchi  Cardiovascular: S1 and S2, regular rate and rhythm, no Murmurs, gallops or rubs  Gastrointestinal: Bowel Sounds present, soft, nontender, nondistended, no guarding, no rebound  Extremities: No peripheral edema  Vascular: 2+ peripheral pulses  Neurological: A/O x 3, no focal deficits  Musculoskeletal: 5/5 strength b/l upper and lower extremities  Skin: No rashes    MEDICATIONS:  MEDICATIONS  (STANDING):  clindamycin IVPB 600 milliGRAM(s) IV Intermittent every 8 hours  dextrose 5% + sodium chloride 0.9%. 1000 milliLiter(s) (75 mL/Hr) IV Continuous <Continuous>  docusate sodium 100 milliGRAM(s) Oral three times a day  enoxaparin Injectable 40 milliGRAM(s) SubCutaneous daily      LABS: All Labs Reviewed:                        9.2    5.53  )-----------( 268      ( 26 Mar 2019 05:40 )             28.7     03-26    137  |  104  |  26<H>  ----------------------------<  112<H>  4.4   |  32<H>  |  0.83    Ca    8.6      26 Mar 2019 05:40        Assessment and Plan:     89 y/o M PMHx significant for hypertension, hyperlipidemia, meningioma, recurrent mechanical falls, and cervical myelopathy/radiculopathy  admitted for:     1) S/p Fall  - mechanical vs syncope? possibly due to bradycardia   - found to be septic   - telemetry: AFIB with bradycardia and pauses   - EP eval appreciated S/p MICRA placement.   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   -s/p debridement of perineum, scrotum and penile shaft   -s/p plastic surgery graft placement today 3/27  C/w local wound care as per Urology orders   s/p IV Zosyn, changed  to PO  Augmentin   C/w Jay   Urology  f/u appreciated        3) JAMIE on CKD, improved   -  pre-renal azotemia vs ATN  - S/p  IV hydration with Improvement of renal Fx  - Encourage oral hydration   - hold Furosemide and ACEI/ARB  - Monitor UO   - repeat labs in am after IV contrast       4)HTN  BP improved, low normal now   Cont Hold meds:  was on  Amlodipine 10mg po daily  Lisinopril 40mg po daily   Metoprolol XL 100mg po daily      5) Severe Protein calorie Malnutrition. Hypoalbuminemia    encourage oral intake, supplements       6) Peripheral edema  likely due to IV hydration and hypoalbuminemia   Doppler neg for DVT  Elevate extremities  Edema improved       7) Elevated TSH  will check T3/T4: WNL  Repeat labs in few weeks     8) AFIB with slow ventricular response  New DX?   Metoprolol held   [id number] score: 5, high risk   CArdio eval appreciated, no recommendation for A/c, will need to follow up     9) Soft tissue mass surrounding proximal aorta, suspicious for metastatic lymphadenopathy/Lymphoma   incidental finding on CT   No h/o malignancy as per Pt, but not following with PCP   CT abd/pelvis with IV contrast  done and confirms mass and also + infiltrative L kidney mass, suspicious for Lymphoma  D/w DR thakur will follow up for further recommendations       10) DVT PPxs

## 2019-03-27 NOTE — BRIEF OPERATIVE NOTE - COMMENTS
stepdown, sepsis monitoring
Keep patient bedrest so ensure no leaks on wound vac.  Wound vac and donor site dressing to remain on for 5 days.  Clindamycin ordered while wound vac in place.

## 2019-03-27 NOTE — BRIEF OPERATIVE NOTE - NSICDXBRIEFPOSTOP_GEN_ALL_CORE_FT
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

## 2019-03-28 RX ADMIN — Medication 100 MILLIGRAM(S): at 06:13

## 2019-03-28 RX ADMIN — OXYCODONE AND ACETAMINOPHEN 1 TABLET(S): 5; 325 TABLET ORAL at 06:15

## 2019-03-28 RX ADMIN — OXYCODONE AND ACETAMINOPHEN 1 TABLET(S): 5; 325 TABLET ORAL at 15:48

## 2019-03-28 RX ADMIN — Medication 100 MILLIGRAM(S): at 11:25

## 2019-03-28 RX ADMIN — Medication 100 MILLIGRAM(S): at 15:52

## 2019-03-28 RX ADMIN — Medication 100 MILLIGRAM(S): at 21:43

## 2019-03-28 RX ADMIN — OXYCODONE AND ACETAMINOPHEN 1 TABLET(S): 5; 325 TABLET ORAL at 21:50

## 2019-03-28 RX ADMIN — ENOXAPARIN SODIUM 40 MILLIGRAM(S): 100 INJECTION SUBCUTANEOUS at 11:24

## 2019-03-28 RX ADMIN — OXYCODONE AND ACETAMINOPHEN 1 TABLET(S): 5; 325 TABLET ORAL at 16:18

## 2019-03-28 RX ADMIN — Medication 100 MILLIGRAM(S): at 08:35

## 2019-03-28 NOTE — PROGRESS NOTE ADULT - SUBJECTIVE AND OBJECTIVE BOX
CHIEF COMPLAINT/Diagnosis: fourniers gangrene/ scrotal fascitis/ bradycardia    SUBJECTIVE: no complaints    REVIEW OF SYSTEMS:    CONSTITUTIONAL: No weakness, fevers or chills  EYES/ENT: No visual changes;  No vertigo or throat pain   NECK: No pain or stiffness  RESPIRATORY: No cough, wheezing, hemoptysis; No shortness of breath  CARDIOVASCULAR: No chest pain or palpitations  GASTROINTESTINAL: No abdominal or epigastric pain. No nausea, vomiting, or hematemesis; No diarrhea or constipation. No melena or hematochezia.  GENITOURINARY: No dysuria, frequency or hematuria  NEUROLOGICAL: No numbness or weakness  SKIN: No itching, burning, rashes, or lesions   All other review of systems is negative unless indicated above    Vital Signs Last 24 Hrs  T(C): 36.9 (28 Mar 2019 10:27), Max: 36.9 (28 Mar 2019 10:27)  T(F): 98.4 (28 Mar 2019 10:27), Max: 98.4 (28 Mar 2019 10:27)  HR: 60 (28 Mar 2019 10:27) (59 - 62)  BP: 114/64 (28 Mar 2019 10:27) (98/52 - 114/64)  BP(mean): --  RR: 18 (28 Mar 2019 10:27) (16 - 18)  SpO2: 98% (28 Mar 2019 10:27) (98% - 100%)    I&O's Summary    27 Mar 2019 07:01  -  28 Mar 2019 07:00  --------------------------------------------------------  IN: 900 mL / OUT: 800 mL / NET: 100 mL        CAPILLARY BLOOD GLUCOSE          PHYSICAL EXAM:    Constitutional: NAD, awake and alert, well-developed  HEENT: PERR, EOMI, Normal Hearing, MMM  Neck: Soft and supple, No LAD, No JVD  Respiratory: Breath sounds are clear bilaterally, No wheezing, rales or rhonchi  Cardiovascular: S1 and S2, regular rate and rhythm, no Murmurs, gallops or rubs  Gastrointestinal: Bowel Sounds present, soft, nontender, nondistended, no guarding, no rebound  Extremities: No peripheral edema  Vascular: 2+ peripheral pulses  Neurological: A/O x 3, no focal deficits  Musculoskeletal: 5/5 strength b/l upper and lower extremities  Skin: No rashes    MEDICATIONS:  MEDICATIONS  (STANDING):  clindamycin IVPB 600 milliGRAM(s) IV Intermittent every 8 hours  dextrose 5% + sodium chloride 0.9%. 1000 milliLiter(s) (75 mL/Hr) IV Continuous <Continuous>  docusate sodium 100 milliGRAM(s) Oral three times a day  enoxaparin Injectable 40 milliGRAM(s) SubCutaneous daily      LABS: All Labs Reviewed:              Assessment and Plan:     88 y/o M PMHx significant for hypertension, hyperlipidemia, meningioma, recurrent mechanical falls, and cervical myelopathy/radiculopathy  admitted for:     1) S/p Fall  - mechanical vs syncope? possibly due to bradycardia   - found to be septic   - telemetry: AFIB with bradycardia and pauses   - EP eval appreciated S/p MICRA placement.   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   -s/p debridement of perineum, scrotum and penile shaft   -s/p plastic surgery graft placement today 3/27 and with wound vac. will need to lay in bed for 5-7 days , and then we will remove wound vac and re-adress wound.   C/w local wound care as per Urology orders   s/p IV Zosyn, changed  to PO  Augmentin   C/w Jay   Urology  f/u appreciated        3) JAMIE on CKD, improved   -  pre-renal azotemia vs ATN  - S/p  IV hydration with Improvement of renal Fx  - Encourage oral hydration   - hold Furosemide and ACEI/ARB  - Monitor UO   - repeat labs in am after IV contrast       4)HTN  BP improved, low normal now   Cont Hold meds:  was on  Amlodipine 10mg po daily  Lisinopril 40mg po daily   Metoprolol XL 100mg po daily      5) Severe Protein calorie Malnutrition. Hypoalbuminemia    encourage oral intake, supplements       6) Peripheral edema  likely due to IV hydration and hypoalbuminemia   Doppler neg for DVT  Elevate extremities  Edema improved       7) Elevated TSH  will check T3/T4: WNL  Repeat labs in few weeks     8) AFIB with slow ventricular response  New DX?   Metoprolol held   [id number] score: 5, high risk   CArdio eval appreciated, no recommendation for A/c, will need to follow up     9) Soft tissue mass surrounding proximal aorta, suspicious for metastatic lymphadenopathy/Lymphoma   incidental finding on CT   No h/o malignancy as per Pt, but not following with PCP   CT abd/pelvis with IV contrast  done and confirms mass and also + infiltrative L kidney mass, suspicious for Lymphoma  D/w DR thakur will follow up for further recommendations       10) DVT PPxs

## 2019-03-29 RX ORDER — HYDROCORTISONE 1 %
1 OINTMENT (GRAM) TOPICAL THREE TIMES A DAY
Qty: 0 | Refills: 0 | Status: DISCONTINUED | OUTPATIENT
Start: 2019-03-29 | End: 2019-04-09

## 2019-03-29 RX ORDER — DIPHENHYDRAMINE HCL 50 MG
50 CAPSULE ORAL ONCE
Qty: 0 | Refills: 0 | Status: COMPLETED | OUTPATIENT
Start: 2019-03-29 | End: 2019-03-29

## 2019-03-29 RX ADMIN — OXYCODONE AND ACETAMINOPHEN 1 TABLET(S): 5; 325 TABLET ORAL at 22:08

## 2019-03-29 RX ADMIN — Medication 100 MILLIGRAM(S): at 05:39

## 2019-03-29 RX ADMIN — Medication 100 MILLIGRAM(S): at 08:58

## 2019-03-29 RX ADMIN — SODIUM CHLORIDE 75 MILLILITER(S): 9 INJECTION, SOLUTION INTRAVENOUS at 05:40

## 2019-03-29 RX ADMIN — Medication 100 MILLIGRAM(S): at 17:33

## 2019-03-29 RX ADMIN — Medication 100 MILLIGRAM(S): at 22:07

## 2019-03-29 RX ADMIN — SODIUM CHLORIDE 75 MILLILITER(S): 9 INJECTION, SOLUTION INTRAVENOUS at 12:18

## 2019-03-29 RX ADMIN — Medication 100 MILLIGRAM(S): at 01:17

## 2019-03-29 RX ADMIN — Medication 50 MILLIGRAM(S): at 13:16

## 2019-03-29 RX ADMIN — Medication 100 MILLIGRAM(S): at 15:58

## 2019-03-29 RX ADMIN — Medication 100 MILLIGRAM(S): at 23:42

## 2019-03-29 RX ADMIN — ENOXAPARIN SODIUM 40 MILLIGRAM(S): 100 INJECTION SUBCUTANEOUS at 15:57

## 2019-03-29 NOTE — PROGRESS NOTE ADULT - SUBJECTIVE AND OBJECTIVE BOX
CHIEF COMPLAINT/Diagnosis: Founiers gangrene/ scrotal fascitis/     SUBJECTIVE: no complaints    REVIEW OF SYSTEMS:    CONSTITUTIONAL: No weakness, fevers or chills  EYES/ENT: No visual changes;  No vertigo or throat pain   NECK: No pain or stiffness  RESPIRATORY: No cough, wheezing, hemoptysis; No shortness of breath  CARDIOVASCULAR: No chest pain or palpitations  GASTROINTESTINAL: No abdominal or epigastric pain. No nausea, vomiting, or hematemesis; No diarrhea or constipation. No melena or hematochezia.  GENITOURINARY: No dysuria, frequency or hematuria  NEUROLOGICAL: No numbness or weakness  SKIN: No itching, burning, rashes, or lesions   All other review of systems is negative unless indicated above    Vital Signs Last 24 Hrs  T(C): 36.4 (29 Mar 2019 10:50), Max: 36.8 (28 Mar 2019 20:50)  T(F): 97.5 (29 Mar 2019 10:50), Max: 98.2 (28 Mar 2019 20:50)  HR: 60 (29 Mar 2019 10:50) (58 - 62)  BP: 110/59 (29 Mar 2019 10:50) (109/67 - 124/69)  BP(mean): --  RR: 18 (29 Mar 2019 10:50) (18 - 18)  SpO2: 99% (29 Mar 2019 10:50) (97% - 99%)    I&O's Summary    28 Mar 2019 07:01  -  29 Mar 2019 07:00  --------------------------------------------------------  IN: 0 mL / OUT: 1450 mL / NET: -1450 mL        CAPILLARY BLOOD GLUCOSE          PHYSICAL EXAM:    Constitutional: NAD, awake and alert, well-developed  HEENT: PERR, EOMI, Normal Hearing, MMM  Neck: Soft and supple, No LAD, No JVD  Respiratory: Breath sounds are clear bilaterally, No wheezing, rales or rhonchi  Cardiovascular: S1 and S2, regular rate and rhythm, no Murmurs, gallops or rubs  Gastrointestinal: Bowel Sounds present, soft, nontender, nondistended, no guarding, no rebound  Extremities: No peripheral edema  Vascular: 2+ peripheral pulses  Neurological: A/O x 3, no focal deficits  Musculoskeletal: 5/5 strength b/l upper and lower extremities  Skin: No rashes    MEDICATIONS:  MEDICATIONS  (STANDING):  clindamycin IVPB 600 milliGRAM(s) IV Intermittent every 8 hours  dextrose 5% + sodium chloride 0.9%. 1000 milliLiter(s) (75 mL/Hr) IV Continuous <Continuous>  docusate sodium 100 milliGRAM(s) Oral three times a day  enoxaparin Injectable 40 milliGRAM(s) SubCutaneous daily      LABS: All Labs Reviewed:        Assessment and Plan:     88 y/o M PMHx significant for hypertension, hyperlipidemia, meningioma, recurrent mechanical falls, and cervical myelopathy/radiculopathy  admitted for:     1) S/p Fall  - mechanical vs syncope? possibly due to bradycardia   - found to be septic   - telemetry: AFIB with bradycardia and pauses   - EP eval appreciated S/p MICRA placement.   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   -s/p debridement of perineum, scrotum and penile shaft   -s/p plastic surgery graft placement today 3/27 and with wound vac. will need to lay in bed for 5-7 days , and then we will remove wound vac and re-adress wound.   C/w local wound care as per Urology orders   s/p IV Zosyn, changed  to PO  Augmentin   C/w Jay   Urology  f/u appreciated        3) JAMIE on CKD, improved   -  pre-renal azotemia vs ATN  - S/p  IV hydration with Improvement of renal Fx  - Encourage oral hydration   - hold Furosemide and ACEI/ARB  - Monitor UO   - repeat labs in am after IV contrast       4)HTN  BP improved, low normal now   Cont Hold meds:  was on  Amlodipine 10mg po daily  Lisinopril 40mg po daily   Metoprolol XL 100mg po daily      5) Severe Protein calorie Malnutrition. Hypoalbuminemia    encourage oral intake, supplements       6) Peripheral edema  likely due to IV hydration and hypoalbuminemia   Doppler neg for DVT  Elevate extremities  Edema improved       7) Elevated TSH  will check T3/T4: WNL  Repeat labs in few weeks     8) AFIB with slow ventricular response  New DX?   Metoprolol held   [id number] score: 5, high risk   CArdio eval appreciated, no recommendation for A/c, will need to follow up     9) Soft tissue mass surrounding proximal aorta, suspicious for metastatic lymphadenopathy/Lymphoma   incidental finding on CT   No h/o malignancy as per Pt, but not following with PCP   CT abd/pelvis with IV contrast  done and confirms mass and also + infiltrative L kidney mass, suspicious for Lymphoma  D/w DR thakur will follow up for further recommendations       10) DVT PPxs     11- mild allergic reaction to the tegaderm on skin where the graft wound is.   -improving   -give stat benadryl  -some mild skin allergy on right arm > hydrocortisone cream    12-b/l upper ext  edema  -u/s b/l upper

## 2019-03-29 NOTE — PROGRESS NOTE ADULT - SUBJECTIVE AND OBJECTIVE BOX
UROLOGY FOLLOW UP NOTE    SUBJECTIVE    Patient seen and examined at bedside.  No acute events overnight.    s/p STSG weds    Current complaints are: none, he had itching/irritation in genitals from the wound vac yesterday but feels better today.        OBJECTIVE    T(C): 36.5 (03-29-19 @ 05:35), Max: 36.9 (03-28-19 @ 10:27)  HR: 58 (03-29-19 @ 05:35)  BP: 109/67 (03-29-19 @ 05:35)  RR: 18 (03-28-19 @ 20:50)  SpO2: 98% (03-29-19 @ 05:35)    03-28-19 @ 07:01  -  03-29-19 @ 07:00  --------------------------------------------------------  IN: 0 mL / OUT: 1450 mL / NET: -1450 mL      Gen: elderly Male in NAD, well nourished  HEENT: normocephalic, hearing intact, moist mucus membranes  Chest: non labored breathing  ABd: soft, NT, ND, no masses  Back: no CVA tenderness  : wound vac dressing applied to genitalia, no leak,, * zhou draining yellow urine  Psych: normal affect and mood  Ext: moves all four extremities freely, right upper thigh donor site with clean dressing                acetaminophen   Tablet .. 650 milliGRAM(s) Oral every 6 hours PRN  bisacodyl Suppository 10 milliGRAM(s) Rectal daily PRN  clindamycin IVPB 600 milliGRAM(s) IV Intermittent every 8 hours  dextrose 5% + sodium chloride 0.9%. 1000 milliLiter(s) IV Continuous <Continuous>  docusate sodium 100 milliGRAM(s) Oral three times a day  enoxaparin Injectable 40 milliGRAM(s) SubCutaneous daily  morphine  - Injectable 2 milliGRAM(s) IV Push every 4 hours PRN  oxyCODONE    5 mG/acetaminophen 325 mG 1 Tablet(s) Oral every 4 hours PRN      ASSESSMENT & PLAN    1. Teetee's gangrene s/p emergent debridement POD#2 after plastic reconstruction with STSG  - No further inpatient urology follow up necessary  - patient should follow up with Dr Newton for wound checks  - remove zhou for trial of void once wound vac is removed        Thank you for allowing me to participate in the care of this patient  Please call with questions or concerns    Tootie Chamorro MD    [facility]  [phone]

## 2019-03-30 LAB
ANION GAP SERPL CALC-SCNC: 7 MMOL/L — SIGNIFICANT CHANGE UP (ref 5–17)
BUN SERPL-MCNC: 24 MG/DL — HIGH (ref 7–23)
CALCIUM SERPL-MCNC: 7.9 MG/DL — LOW (ref 8.5–10.1)
CHLORIDE SERPL-SCNC: 108 MMOL/L — SIGNIFICANT CHANGE UP (ref 96–108)
CO2 SERPL-SCNC: 25 MMOL/L — SIGNIFICANT CHANGE UP (ref 22–31)
CREAT SERPL-MCNC: 0.87 MG/DL — SIGNIFICANT CHANGE UP (ref 0.5–1.3)
GLUCOSE SERPL-MCNC: 106 MG/DL — HIGH (ref 70–99)
HCT VFR BLD CALC: 26.8 % — LOW (ref 39–50)
HGB BLD-MCNC: 8.3 G/DL — LOW (ref 13–17)
MCHC RBC-ENTMCNC: 31 GM/DL — LOW (ref 32–36)
MCHC RBC-ENTMCNC: 32.2 PG — SIGNIFICANT CHANGE UP (ref 27–34)
MCV RBC AUTO: 103.9 FL — HIGH (ref 80–100)
NRBC # BLD: 0 /100 WBCS — SIGNIFICANT CHANGE UP (ref 0–0)
PLATELET # BLD AUTO: 275 K/UL — SIGNIFICANT CHANGE UP (ref 150–400)
POTASSIUM SERPL-MCNC: 4.4 MMOL/L — SIGNIFICANT CHANGE UP (ref 3.5–5.3)
POTASSIUM SERPL-SCNC: 4.4 MMOL/L — SIGNIFICANT CHANGE UP (ref 3.5–5.3)
RBC # BLD: 2.58 M/UL — LOW (ref 4.2–5.8)
RBC # FLD: 14.8 % — HIGH (ref 10.3–14.5)
SODIUM SERPL-SCNC: 140 MMOL/L — SIGNIFICANT CHANGE UP (ref 135–145)
WBC # BLD: 5.5 K/UL — SIGNIFICANT CHANGE UP (ref 3.8–10.5)
WBC # FLD AUTO: 5.5 K/UL — SIGNIFICANT CHANGE UP (ref 3.8–10.5)

## 2019-03-30 RX ADMIN — ENOXAPARIN SODIUM 40 MILLIGRAM(S): 100 INJECTION SUBCUTANEOUS at 16:11

## 2019-03-30 RX ADMIN — Medication 100 MILLIGRAM(S): at 05:23

## 2019-03-30 RX ADMIN — Medication 100 MILLIGRAM(S): at 16:11

## 2019-03-30 RX ADMIN — Medication 100 MILLIGRAM(S): at 09:03

## 2019-03-30 NOTE — PROGRESS NOTE ADULT - SUBJECTIVE AND OBJECTIVE BOX
CHIEF COMPLAINT/Diagnosis: founiers gangrene/ scrotal fascitis/ sepsis    SUBJECTIVE: no complaints    REVIEW OF SYSTEMS:    CONSTITUTIONAL: No weakness, fevers or chills  EYES/ENT: No visual changes;  No vertigo or throat pain   NECK: No pain or stiffness  RESPIRATORY: No cough, wheezing, hemoptysis; No shortness of breath  CARDIOVASCULAR: No chest pain or palpitations  GASTROINTESTINAL: No abdominal or epigastric pain. No nausea, vomiting, or hematemesis; No diarrhea or constipation. No melena or hematochezia.  GENITOURINARY: No dysuria, frequency or hematuria  NEUROLOGICAL: No numbness or weakness  SKIN: No itching, burning, rashes, or lesions   All other review of systems is negative unless indicated above    Vital Signs Last 24 Hrs  T(C): 36.3 (30 Mar 2019 05:24), Max: 36.7 (29 Mar 2019 21:34)  T(F): 97.4 (30 Mar 2019 05:24), Max: 98.1 (29 Mar 2019 21:34)  HR: 63 (29 Mar 2019 21:34) (60 - 63)  BP: 108/56 (30 Mar 2019 05:24) (108/56 - 124/57)  BP(mean): --  RR: 18 (30 Mar 2019 05:24) (18 - 19)  SpO2: 97% (30 Mar 2019 05:24) (97% - 99%)    I&O's Summary    29 Mar 2019 07:01  -  30 Mar 2019 07:00  --------------------------------------------------------  IN: 0 mL / OUT: 1350 mL / NET: -1350 mL        CAPILLARY BLOOD GLUCOSE          PHYSICAL EXAM:    Constitutional: NAD, awake and alert, well-developed  HEENT: PERR, EOMI, Normal Hearing, MMM  Neck: Soft and supple, No LAD, No JVD  Respiratory: Breath sounds are clear bilaterally, No wheezing, rales or rhonchi  Cardiovascular: S1 and S2, regular rate and rhythm, no Murmurs, gallops or rubs  Gastrointestinal: Bowel Sounds present, soft, nontender, nondistended, no guarding, no rebound  Extremities: No peripheral edema  Vascular: 2+ peripheral pulses  Neurological: A/O x 3, no focal deficits  Musculoskeletal: 5/5 strength b/l upper and lower extremities  Skin: No rashes    MEDICATIONS:  MEDICATIONS  (STANDING):  clindamycin IVPB 600 milliGRAM(s) IV Intermittent every 8 hours  dextrose 5% + sodium chloride 0.9%. 1000 milliLiter(s) (75 mL/Hr) IV Continuous <Continuous>  docusate sodium 100 milliGRAM(s) Oral three times a day  enoxaparin Injectable 40 milliGRAM(s) SubCutaneous daily      LABS: All Labs Reviewed:        Assessment and Plan:     88 y/o M PMHx significant for hypertension, hyperlipidemia, meningioma, recurrent mechanical falls, and cervical myelopathy/radiculopathy  admitted for:     1) S/p Fall  - mechanical vs syncope? possibly due to bradycardia   - found to be septic   - telemetry: AFIB with bradycardia and pauses   - EP eval appreciated S/p MICRA placement.   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   -s/p debridement of perineum, scrotum and penile shaft   -s/p plastic surgery graft placement today 3/27 and with wound vac. will need to lay in bed for 5-7 days , and then we will remove wound vac and re-adress wound.   -nursing to get air mattress to prevent decubitus  C/w local wound care as per Urology orders   s/p IV Zosyn, changed  to PO  Augmentin   C/w Jay   Urology  f/u appreciated        3) JAMIE on CKD, improved   -  pre-renal azotemia vs ATN  - S/p  IV hydration with Improvement of renal Fx  - Encourage oral hydration   - hold Furosemide and ACEI/ARB  - Monitor UO   - repeat labs in am after IV contrast       4)HTN  BP improved, low normal now   Cont Hold meds:  was on  Amlodipine 10mg po daily  Lisinopril 40mg po daily   Metoprolol XL 100mg po daily      5) Severe Protein calorie Malnutrition. Hypoalbuminemia    encourage oral intake, supplements       6) Peripheral edema  likely due to IV hydration and hypoalbuminemia   Doppler neg for DVT  Elevate extremities  Edema improved       7) Elevated TSH  will check T3/T4: WNL  Repeat labs in few weeks     8) AFIB with slow ventricular response  New DX?   Metoprolol held   [id number] score: 5, high risk   CArdio eval appreciated, no recommendation for A/c, will need to follow up     9) Soft tissue mass surrounding proximal aorta, suspicious for metastatic lymphadenopathy/Lymphoma   incidental finding on CT   No h/o malignancy as per Pt, but not following with PCP   CT abd/pelvis with IV contrast  done and confirms mass and also + infiltrative L kidney mass, suspicious for Lymphoma  D/w DR thakur will follow up for further recommendations       10) DVT PPxs     11- mild allergic reaction to the tegaderm on skin where the graft wound is.   -improving   -give stat benadryl  -some mild skin allergy on right arm > hydrocortisone cream    12-b/l upper ext  edema  -u/s b/l upper

## 2019-03-31 PROCEDURE — 93970 EXTREMITY STUDY: CPT | Mod: 26

## 2019-03-31 RX ORDER — ENOXAPARIN SODIUM 100 MG/ML
80 INJECTION SUBCUTANEOUS
Qty: 0 | Refills: 0 | Status: COMPLETED | OUTPATIENT
Start: 2019-03-31 | End: 2019-04-06

## 2019-03-31 RX ORDER — FUROSEMIDE 40 MG
40 TABLET ORAL ONCE
Qty: 0 | Refills: 0 | Status: COMPLETED | OUTPATIENT
Start: 2019-03-31 | End: 2019-03-31

## 2019-03-31 RX ORDER — FUROSEMIDE 40 MG
40 TABLET ORAL
Qty: 0 | Refills: 0 | Status: COMPLETED | OUTPATIENT
Start: 2019-03-31 | End: 2019-04-01

## 2019-03-31 RX ORDER — DOCUSATE SODIUM 100 MG
100 CAPSULE ORAL THREE TIMES A DAY
Qty: 0 | Refills: 0 | Status: DISCONTINUED | OUTPATIENT
Start: 2019-03-31 | End: 2019-04-09

## 2019-03-31 RX ADMIN — Medication 40 MILLIGRAM(S): at 11:40

## 2019-03-31 RX ADMIN — OXYCODONE AND ACETAMINOPHEN 1 TABLET(S): 5; 325 TABLET ORAL at 04:49

## 2019-03-31 RX ADMIN — Medication 40 MILLIGRAM(S): at 17:45

## 2019-03-31 RX ADMIN — Medication 100 MILLIGRAM(S): at 00:05

## 2019-03-31 RX ADMIN — Medication 100 MILLIGRAM(S): at 17:45

## 2019-03-31 RX ADMIN — OXYCODONE AND ACETAMINOPHEN 1 TABLET(S): 5; 325 TABLET ORAL at 21:40

## 2019-03-31 RX ADMIN — OXYCODONE AND ACETAMINOPHEN 1 TABLET(S): 5; 325 TABLET ORAL at 21:07

## 2019-03-31 RX ADMIN — ENOXAPARIN SODIUM 80 MILLIGRAM(S): 100 INJECTION SUBCUTANEOUS at 21:09

## 2019-03-31 RX ADMIN — SODIUM CHLORIDE 75 MILLILITER(S): 9 INJECTION, SOLUTION INTRAVENOUS at 06:40

## 2019-03-31 RX ADMIN — ENOXAPARIN SODIUM 40 MILLIGRAM(S): 100 INJECTION SUBCUTANEOUS at 11:41

## 2019-03-31 RX ADMIN — Medication 100 MILLIGRAM(S): at 10:09

## 2019-03-31 NOTE — PROVIDER CONTACT NOTE (OTHER) - NAME OF MD/NP/PA/DO NOTIFIED:
MOMO Ray. Dr. Ayala, call placed to service, awaiting call back. MOMO Ray. Dr. Marsh, call placed to service, awaiting call back.

## 2019-03-31 NOTE — PROVIDER CONTACT NOTE (OTHER) - ACTION/TREATMENT ORDERED:
Call placed to Dr. Ayala, awaiting call back Dr. Marsh states he will come on Monday or Tuesday to remove dressing, or will communicate with surgical PAs to remove--should not be removed until he communicates further.

## 2019-03-31 NOTE — CHART NOTE - NSCHARTNOTEFT_GEN_A_CORE
Called to see pr for scatter blistering around Tegaderm  .  PT seen and examined with out complaints.  Has few scatted blisters long tegaderm covering wound vac that is to be changed in 3 day.  Recommend local would care with bacitracin .  called placed to Dr. Newton to discuss further.

## 2019-03-31 NOTE — PROGRESS NOTE ADULT - SUBJECTIVE AND OBJECTIVE BOX
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 No nausea, vomiting, or hematemesis; No diarrhea or constipation. No melena or hematochezia.  GENITOURINARY: No dysuria, frequency or hematuria  NEUROLOGICAL: No numbness or weakness  SKIN: No itching, burning, rashes, or lesions   All other review of systems is negative unless indicated above    Vital Signs Last 24 Hrs  T(C): 36.7 (31 Mar 2019 05:03), Max: 36.7 (30 Mar 2019 16:33)  T(F): 98 (31 Mar 2019 05:03), Max: 98.1 (30 Mar 2019 16:33)  HR: 63 (31 Mar 2019 05:03) (60 - 63)  BP: 132/66 (31 Mar 2019 05:03) (106/53 - 132/66)  BP(mean): --  RR: 16 (30 Mar 2019 21:42) (16 - 18)  SpO2: 95% (31 Mar 2019 05:03) (95% - 98%)    I&O's Summary    30 Mar 2019 07:01  -  31 Mar 2019 07:00  --------------------------------------------------------  IN: 0 mL / OUT: 1600 mL / NET: -1600 mL    31 Mar 2019 07:01  -  31 Mar 2019 10:47  --------------------------------------------------------  IN: 0 mL / OUT: 50 mL / NET: -50 mL        CAPILLARY BLOOD GLUCOSE          PHYSICAL EXAM:    Constitutional: NAD, awake and alert, well-developed  HEENT: PERR, EOMI, Normal Hearing, MMM  Neck: Soft and supple, No LAD, No JVD  Respiratory: Breath sounds are clear bilaterally, No wheezing, rales or rhonchi  Cardiovascular: S1 and S2, regular rate and rhythm, no Murmurs, gallops or rubs  Gastrointestinal: Bowel Sounds present, soft, nontender, nondistended, no guarding, no rebound  Extremities: No peripheral edema  Vascular: 2+ peripheral pulses  Neurological: A/O x 3, no focal deficits  Musculoskeletal: 5/5 strength b/l upper and lower extremities  Skin: No rashes    MEDICATIONS:  MEDICATIONS  (STANDING):  clindamycin IVPB 600 milliGRAM(s) IV Intermittent every 8 hours  dextrose 5% + sodium chloride 0.9%. 1000 milliLiter(s) (75 mL/Hr) IV Continuous <Continuous>  docusate sodium 100 milliGRAM(s) Oral three times a day  enoxaparin Injectable 40 milliGRAM(s) SubCutaneous daily      LABS: All Labs Reviewed:                        8.3    5.50  )-----------( 275      ( 30 Mar 2019 09:28 )             26.8     03-30    140  |  108  |  24<H>  ----------------------------<  106<H>  4.4   |  25  |  0.87    Ca    7.9<L>      30 Mar 2019 09:28        Assessment and Plan:     88 y/o M PMHx significant for hypertension, hyperlipidemia, meningioma, recurrent mechanical falls, and cervical myelopathy/radiculopathy  admitted for:     1) S/p Fall  - mechanical vs syncope? possibly due to bradycardia   - found to be septic   - telemetry: AFIB with bradycardia and pauses   - EP eval appreciated S/p MICRA placement.   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   -s/p debridement of perineum, scrotum and penile shaft   -s/p plastic surgery graft placement today 3/27 and with wound vac. will need to lay in bed for 5-7 days , and then we will remove wound vac and re-adress wound.   -nursing to get air mattress to prevent decubitus  C/w local wound care as per Urology orders   s/p IV Zosyn, changed  to PO  Augmentin   C/w Jay   Urology  f/u appreciated        3) JAMIE on CKD, improved   -  pre-renal azotemia vs ATN  - S/p  IV hydration with Improvement of renal Fx  - Encourage oral hydration   - hold Furosemide and ACEI/ARB  - Monitor UO   - repeat labs in am after IV contrast       4)HTN  BP improved, low normal now   Cont Hold meds:  was on  Amlodipine 10mg po daily  Lisinopril 40mg po daily   Metoprolol XL 100mg po daily      5) Severe Protein calorie Malnutrition. Hypoalbuminemia    encourage oral intake, supplements       6) Peripheral edema  likely due to IV hydration and hypoalbuminemia   Doppler neg for DVT  Elevate extremities  Edema improved       7) Elevated TSH  will check T3/T4: WNL  Repeat labs in few weeks     8) AFIB with slow ventricular response  New DX?   Metoprolol held   [id number] score: 5, high risk   CArdio eval appreciated, no recommendation for A/c, will need to follow up     9) Soft tissue mass surrounding proximal aorta, suspicious for metastatic lymphadenopathy/Lymphoma   incidental finding on CT   No h/o malignancy as per Pt, but not following with PCP   CT abd/pelvis with IV contrast  done and confirms mass and also + infiltrative L kidney mass, suspicious for Lymphoma  D/w DR thakur will follow up for further recommendations       10) DVT PPxs     11- mild allergic reaction to the tegaderm on skin where the graft wound is.   -improving   -give stat benadryl  -some mild skin allergy on right arm > hydrocortisone cream    12-b/l upper ext  edema  -u/s b/l upper CHIEF COMPLAINT/Diagnosis: founiers gangrene/ scrotal fascitis/ left upper ext dvt    SUBJECTIVE: no complaints    REVIEW OF SYSTEMS:    CONSTITUTIONAL: No weakness, fevers or chills  EYES/ENT: No visual changes;  No vertigo or throat pain   NECK: No pain or stiffness  RESPIRATORY: No cough, wheezing, hemoptysis; No shortness of breath  CARDIOVASCULAR: No chest pain or palpitations  GASTROINTESTINAL: No abdominal or epigastric pain. No nausea, vomiting, or hematemesis; No diarrhea or constipation. No melena or hematochezia.  GENITOURINARY: No dysuria, frequency or hematuria  NEUROLOGICAL: No numbness or weakness  SKIN: No itching, burning, rashes, or lesions   All other review of systems is negative unless indicated above    Vital Signs Last 24 Hrs  T(C): 36.7 (31 Mar 2019 05:03), Max: 36.7 (30 Mar 2019 16:33)  T(F): 98 (31 Mar 2019 05:03), Max: 98.1 (30 Mar 2019 16:33)  HR: 63 (31 Mar 2019 05:03) (60 - 63)  BP: 132/66 (31 Mar 2019 05:03) (106/53 - 132/66)  BP(mean): --  RR: 16 (30 Mar 2019 21:42) (16 - 18)  SpO2: 95% (31 Mar 2019 05:03) (95% - 98%)    I&O's Summary    30 Mar 2019 07:01  -  31 Mar 2019 07:00  --------------------------------------------------------  IN: 0 mL / OUT: 1600 mL / NET: -1600 mL    31 Mar 2019 07:01  -  31 Mar 2019 10:47  --------------------------------------------------------  IN: 0 mL / OUT: 50 mL / NET: -50 mL        CAPILLARY BLOOD GLUCOSE          PHYSICAL EXAM:    Constitutional: NAD, awake and alert, well-developed  HEENT: PERR, EOMI, Normal Hearing, MMM  Neck: Soft and supple, No LAD, No JVD  Respiratory: Breath sounds are clear bilaterally, No wheezing, rales or rhonchi  Cardiovascular: S1 and S2, regular rate and rhythm, no Murmurs, gallops or rubs  Gastrointestinal: Bowel Sounds present, soft, nontender, nondistended, no guarding, no rebound  Extremities: No peripheral edema  Vascular: 2+ peripheral pulses  Neurological: A/O x 3, no focal deficits  Musculoskeletal: 5/5 strength b/l upper and lower extremities  Skin: No rashes    MEDICATIONS:  MEDICATIONS  (STANDING):  clindamycin IVPB 600 milliGRAM(s) IV Intermittent every 8 hours  dextrose 5% + sodium chloride 0.9%. 1000 milliLiter(s) (75 mL/Hr) IV Continuous <Continuous>  docusate sodium 100 milliGRAM(s) Oral three times a day  enoxaparin Injectable 40 milliGRAM(s) SubCutaneous daily      LABS: All Labs Reviewed:                        8.3    5.50  )-----------( 275      ( 30 Mar 2019 09:28 )             26.8     03-30    140  |  108  |  24<H>  ----------------------------<  106<H>  4.4   |  25  |  0.87    Ca    7.9<L>      30 Mar 2019 09:28        Assessment and Plan:     88 y/o M PMHx significant for hypertension, hyperlipidemia, meningioma, recurrent mechanical falls, and cervical myelopathy/radiculopathy  admitted for:     1) S/p Fall  - mechanical vs syncope? possibly due to bradycardia   - found to be septic   - telemetry: AFIB with bradycardia and pauses   - EP eval appreciated S/p MICRA placement.   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   -s/p debridement of perineum, scrotum and penile shaft   -s/p plastic surgery graft placement today 3/27 and with wound vac. will need to lay in bed for 5-7 days , and then we will remove wound vac and re-adress wound.   -nursing to get air mattress to prevent decubitus  C/w local wound care as per Urology orders   s/p IV Zosyn, changed  to PO  Augmentin   C/w Jay   Urology  f/u appreciated        3) JAMIE on CKD, improved   -  pre-renal azotemia vs ATN  - S/p  IV hydration with Improvement of renal Fx  - Encourage oral hydration   - hold Furosemide and ACEI/ARB  - Monitor UO   - repeat labs in am after IV contrast       4)HTN  BP improved, low normal now   Cont Hold meds:  was on  Amlodipine 10mg po daily  Lisinopril 40mg po daily   Metoprolol XL 100mg po daily      5) Severe Protein calorie Malnutrition. Hypoalbuminemia    encourage oral intake, supplements       6) Peripheral edema  likely due to IV hydration and hypoalbuminemia   Doppler neg for DVT  Elevate extremities  Edema improved       7) Elevated TSH  will check T3/T4: WNL  Repeat labs in few weeks     8) AFIB with slow ventricular response  New DX?   Metoprolol held   [id number] score: 5, high risk   CArdio eval appreciated, no recommendation for A/c, will need to follow up     9) Soft tissue mass surrounding proximal aorta, suspicious for metastatic lymphadenopathy/Lymphoma   incidental finding on CT   No h/o malignancy as per Pt, but not following with PCP   CT abd/pelvis with IV contrast  done and confirms mass and also + infiltrative L kidney mass, suspicious for Lymphoma  D/w DR thakur will follow up for further recommendations       10) DVT PPxs     11- mild allergic reaction to the tegaderm on skin where the graft wound is.   -improving   -give stat benadryl  -some mild skin allergy on right arm > hydrocortisone cream    12-Left upper ext DVT  -start Sc lovenox full dose 80 q12h CHIEF COMPLAINT/Diagnosis: founiers gangrene/ scrotal fascitis/ left upper ext dvt    SUBJECTIVE: no complaints    REVIEW OF SYSTEMS:    CONSTITUTIONAL: No weakness, fevers or chills  EYES/ENT: No visual changes;  No vertigo or throat pain   NECK: No pain or stiffness  RESPIRATORY: No cough, wheezing, hemoptysis; No shortness of breath  CARDIOVASCULAR: No chest pain or palpitations  GASTROINTESTINAL: No abdominal or epigastric pain. No nausea, vomiting, or hematemesis; No diarrhea or constipation. No melena or hematochezia.  GENITOURINARY: No dysuria, frequency or hematuria  NEUROLOGICAL: No numbness or weakness  SKIN: No itching, burning, rashes, or lesions   All other review of systems is negative unless indicated above    Vital Signs Last 24 Hrs  T(C): 36.7 (31 Mar 2019 05:03), Max: 36.7 (30 Mar 2019 16:33)  T(F): 98 (31 Mar 2019 05:03), Max: 98.1 (30 Mar 2019 16:33)  HR: 63 (31 Mar 2019 05:03) (60 - 63)  BP: 132/66 (31 Mar 2019 05:03) (106/53 - 132/66)  BP(mean): --  RR: 16 (30 Mar 2019 21:42) (16 - 18)  SpO2: 95% (31 Mar 2019 05:03) (95% - 98%)    I&O's Summary    30 Mar 2019 07:01  -  31 Mar 2019 07:00  --------------------------------------------------------  IN: 0 mL / OUT: 1600 mL / NET: -1600 mL    31 Mar 2019 07:01  -  31 Mar 2019 10:47  --------------------------------------------------------  IN: 0 mL / OUT: 50 mL / NET: -50 mL        CAPILLARY BLOOD GLUCOSE          PHYSICAL EXAM:    Constitutional: NAD, awake and alert, well-developed  HEENT: PERR, EOMI, Normal Hearing, MMM  Neck: Soft and supple, No LAD, No JVD  Respiratory: Breath sounds are clear bilaterally, No wheezing, rales or rhonchi  Cardiovascular: S1 and S2, regular rate and rhythm, no Murmurs, gallops or rubs  Gastrointestinal: Bowel Sounds present, soft, nontender, nondistended, no guarding, no rebound  Extremities: No peripheral edema  Vascular: 2+ peripheral pulses  Neurological: A/O x 3, no focal deficits  Musculoskeletal: 5/5 strength b/l upper and lower extremities  Skin: No rashes    MEDICATIONS:  MEDICATIONS  (STANDING):  clindamycin IVPB 600 milliGRAM(s) IV Intermittent every 8 hours  dextrose 5% + sodium chloride 0.9%. 1000 milliLiter(s) (75 mL/Hr) IV Continuous <Continuous>  docusate sodium 100 milliGRAM(s) Oral three times a day  enoxaparin Injectable 40 milliGRAM(s) SubCutaneous daily      LABS: All Labs Reviewed:                        8.3    5.50  )-----------( 275      ( 30 Mar 2019 09:28 )             26.8     03-30    140  |  108  |  24<H>  ----------------------------<  106<H>  4.4   |  25  |  0.87    Ca    7.9<L>      30 Mar 2019 09:28        Assessment and Plan:     88 y/o M PMHx significant for hypertension, hyperlipidemia, meningioma, recurrent mechanical falls, and cervical myelopathy/radiculopathy  admitted for:     1) S/p Fall  - mechanical vs syncope? possibly due to bradycardia   - found to be septic   - telemetry: AFIB with bradycardia and pauses   - EP eval appreciated S/p MICRA placement.   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   -s/p debridement of perineum, scrotum and penile shaft   -s/p plastic surgery graft placement today 3/27 and with wound vac. will need to lay in bed for 5-7 days , and then we will remove wound vac and re-adress wound.   -nursing to get air mattress to prevent decubitus  C/w local wound care as per Urology orders   s/p IV Zosyn, changed  to PO  Augmentin   C/w Jay   Urology  f/u appreciated        3) JAMIE on CKD, improved   -  pre-renal azotemia vs ATN  - S/p  IV hydration with Improvement of renal Fx  - Encourage oral hydration   - hold Furosemide and ACEI/ARB  - Monitor UO   - repeat labs in am after IV contrast       4)HTN  BP improved, low normal now   Cont Hold meds:  was on  Amlodipine 10mg po daily  Lisinopril 40mg po daily   Metoprolol XL 100mg po daily      5) Severe Protein calorie Malnutrition. Hypoalbuminemia    encourage oral intake, supplements       6) Peripheral edema  likely due to IV hydration and hypoalbuminemia   Doppler neg for DVT  Elevate extremities  Edema improved       7) Elevated TSH  will check T3/T4: WNL  Repeat labs in few weeks     8) AFIB with slow ventricular response  New DX?   Metoprolol held   [id number] score: 5, high risk   CArdio eval appreciated, no recommendation for A/c, will need to follow up     9) Soft tissue mass surrounding proximal aorta, suspicious for metastatic lymphadenopathy/Lymphoma   incidental finding on CT   No h/o malignancy as per Pt, but not following with PCP   CT abd/pelvis with IV contrast  done and confirms mass and also + infiltrative L kidney mass, suspicious for Lymphoma  D/w DR thakur will follow up for further recommendations       10) DVT PPxs     11- mild allergic reaction to the tegaderm on skin where the graft wound is.   -improving   -give stat benadryl  -some mild skin allergy on right arm > hydrocortisone cream    12-Left upper ext DVT  -start Sc lovenox full dose 80 q12h    13-edema, upper and lower ext  -start iv lasix   -stop fluids  -likley contributing to the blisters forming around the tegaderm on wound.

## 2019-04-01 RX ORDER — DIPHENHYDRAMINE HCL 50 MG
50 CAPSULE ORAL EVERY 6 HOURS
Qty: 0 | Refills: 0 | Status: DISCONTINUED | OUTPATIENT
Start: 2019-04-01 | End: 2019-04-09

## 2019-04-01 RX ADMIN — Medication 40 MILLIGRAM(S): at 05:27

## 2019-04-01 RX ADMIN — ENOXAPARIN SODIUM 80 MILLIGRAM(S): 100 INJECTION SUBCUTANEOUS at 05:27

## 2019-04-01 RX ADMIN — ENOXAPARIN SODIUM 80 MILLIGRAM(S): 100 INJECTION SUBCUTANEOUS at 17:15

## 2019-04-01 RX ADMIN — Medication 100 MILLIGRAM(S): at 10:51

## 2019-04-01 RX ADMIN — Medication 100 MILLIGRAM(S): at 01:39

## 2019-04-01 RX ADMIN — Medication 100 MILLIGRAM(S): at 17:15

## 2019-04-01 RX ADMIN — Medication 1 APPLICATION(S): at 00:16

## 2019-04-01 RX ADMIN — Medication 40 MILLIGRAM(S): at 17:16

## 2019-04-01 RX ADMIN — Medication 50 MILLIGRAM(S): at 12:34

## 2019-04-01 NOTE — CHART NOTE - NSCHARTNOTEFT_GEN_A_CORE
Called by Dr. Marsh to examine pt at bedside for blister on or around the wound vac on scrotum. s/p debridement mayelin's gangrene POD #5. Pt seen and examined wound both donor site and scrotum site. Wound vac in place. There is some blister formation near the wound vac. Pt also have serous sanguinous collection under Tegaderm of donor site. Drained donor site collection and reinforced with Tegaderm bandage. Dr. Marsh will evaluate change vac on Wednesday.

## 2019-04-01 NOTE — PROGRESS NOTE ADULT - SUBJECTIVE AND OBJECTIVE BOX
CHIEF COMPLAINT/Diagnosis:  founeirs gangrene/ scrotal fascitis/ cellulitis/ volume overload    SUBJECTIVE: no complaints    REVIEW OF SYSTEMS:    CONSTITUTIONAL: No weakness, fevers or chills  EYES/ENT: No visual changes;  No vertigo or throat pain   NECK: No pain or stiffness  RESPIRATORY: No cough, wheezing, hemoptysis; No shortness of breath  CARDIOVASCULAR: No chest pain or palpitations  GASTROINTESTINAL: No abdominal or epigastric pain. No nausea, vomiting, or hematemesis; No diarrhea or constipation. No melena or hematochezia.  GENITOURINARY: No dysuria, frequency or hematuria  NEUROLOGICAL: No numbness or weakness  SKIN: No itching, burning, rashes, or lesions   All other review of systems is negative unless indicated above    Vital Signs Last 24 Hrs  T(C): 36.8 (01 Apr 2019 17:22), Max: 36.8 (01 Apr 2019 17:22)  T(F): 98.2 (01 Apr 2019 17:22), Max: 98.2 (01 Apr 2019 17:22)  HR: 61 (01 Apr 2019 17:22) (60 - 61)  BP: 116/69 (01 Apr 2019 17:22) (114/49 - 123/68)  BP(mean): --  RR: 18 (01 Apr 2019 17:22) (18 - 18)  SpO2: 97% (01 Apr 2019 17:22) (96% - 99%)    I&O's Summary    31 Mar 2019 07:01  -  01 Apr 2019 07:00  --------------------------------------------------------  IN: 70 mL / OUT: 5510 mL / NET: -5440 mL    01 Apr 2019 07:01  -  01 Apr 2019 17:49  --------------------------------------------------------  IN: 0 mL / OUT: 400 mL / NET: -400 mL        CAPILLARY BLOOD GLUCOSE          PHYSICAL EXAM:    Constitutional: NAD, awake and alert, well-developed  HEENT: PERR, EOMI, Normal Hearing, MMM  Neck: Soft and supple, No LAD, No JVD  Respiratory: Breath sounds are clear bilaterally, No wheezing, rales or rhonchi  Cardiovascular: S1 and S2, regular rate and rhythm, no Murmurs, gallops or rubs  Gastrointestinal: Bowel Sounds present, soft, nontender, nondistended, no guarding, no rebound  Extremities: No peripheral edema  Vascular: 2+ peripheral pulses  Neurological: A/O x 3, no focal deficits  Musculoskeletal: 5/5 strength b/l upper and lower extremities  Skin: No rashes    MEDICATIONS:  MEDICATIONS  (STANDING):  clindamycin IVPB 600 milliGRAM(s) IV Intermittent every 8 hours  enoxaparin Injectable 80 milliGRAM(s) SubCutaneous two times a day      LABS: All Labs Reviewed:          Assessment and Plan:     89 y/o M PMHx significant for hypertension, hyperlipidemia, meningioma, recurrent mechanical falls, and cervical myelopathy/radiculopathy  admitted for:     1) S/p Fall  - mechanical vs syncope? possibly due to bradycardia   - found to be septic   - telemetry: AFIB with bradycardia and pauses   - EP eval appreciated S/p MICRA placement.   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   -s/p debridement of perineum, scrotum and penile shaft   -s/p plastic surgery graft placement today 3/27 and with wound vac. will need to lay in bed for 5-7 days , and then we will remove wound vac and re-adress wound.   -nursing to get air mattress to prevent decubitus  C/w local wound care as per Urology orders   s/p IV Zosyn, changed  to PO  Augmentin   C/w Jay   Urology  f/u appreciated        3) JAMIE on CKD, improved   -  pre-renal azotemia vs ATN  - S/p  IV hydration with Improvement of renal Fx  - Encourage oral hydration   - hold Furosemide and ACEI/ARB  - Monitor UO   - repeat labs in am after IV contrast       4)HTN  BP improved, low normal now   Cont Hold meds:  was on  Amlodipine 10mg po daily  Lisinopril 40mg po daily   Metoprolol XL 100mg po daily      5) Severe Protein calorie Malnutrition. Hypoalbuminemia    encourage oral intake, supplements       6) Peripheral edema  likely due to IV hydration and hypoalbuminemia   Doppler neg for DVT  Elevate extremities  Edema improved       7) Elevated TSH  will check T3/T4: WNL  Repeat labs in few weeks     8) AFIB with slow ventricular response  New DX?   Metoprolol held   [id number] score: 5, high risk   CArdio eval appreciated, no recommendation for A/c, will need to follow up     9) Soft tissue mass surrounding proximal aorta, suspicious for metastatic lymphadenopathy/Lymphoma   incidental finding on CT   No h/o malignancy as per Pt, but not following with PCP   CT abd/pelvis with IV contrast  done and confirms mass and also + infiltrative L kidney mass, suspicious for Lymphoma  D/w DR thakur will follow up for further recommendations       10) DVT PPxs     11- mild allergic reaction to the tegaderm on skin where the graft wound is.   -improving   -give stat benadryl  -some mild skin allergy on right arm > hydrocortisone cream    12-Left upper ext DVT  -start Sc lovenox full dose 80 q12h    13-edema, upper and lower ext  -start iv lasix   -stop fluids  -likley contributing to the blisters forming around the tegaderm on wound.      Plan: patient s/p resection of scrotum and shaft of penis, and then subsequently went under another surgery for graft placement by plastics.  wound vac on graft site and on the scrotum. needs to be there for 7 days. wound vac to be removed on wednesday.   Patient has been gettign alof of fluids since admission, which have now been d/c, maybe contributing to genralized edematous state  and subsequwent water blisters forming.   c/w IV lasix for 2 -3 days.    DC plan will depend on if any further surgeries needed by plastic surgery team.

## 2019-04-02 RX ORDER — FUROSEMIDE 40 MG
40 TABLET ORAL ONCE
Qty: 0 | Refills: 0 | Status: COMPLETED | OUTPATIENT
Start: 2019-04-02 | End: 2019-04-02

## 2019-04-02 RX ORDER — FUROSEMIDE 40 MG
20 TABLET ORAL DAILY
Qty: 0 | Refills: 0 | Status: DISCONTINUED | OUTPATIENT
Start: 2019-04-02 | End: 2019-04-09

## 2019-04-02 RX ADMIN — Medication 100 MILLIGRAM(S): at 11:01

## 2019-04-02 RX ADMIN — OXYCODONE AND ACETAMINOPHEN 1 TABLET(S): 5; 325 TABLET ORAL at 07:14

## 2019-04-02 RX ADMIN — Medication 40 MILLIGRAM(S): at 17:16

## 2019-04-02 RX ADMIN — ENOXAPARIN SODIUM 80 MILLIGRAM(S): 100 INJECTION SUBCUTANEOUS at 17:16

## 2019-04-02 RX ADMIN — Medication 100 MILLIGRAM(S): at 02:17

## 2019-04-02 RX ADMIN — ENOXAPARIN SODIUM 80 MILLIGRAM(S): 100 INJECTION SUBCUTANEOUS at 05:25

## 2019-04-02 RX ADMIN — Medication 20 MILLIGRAM(S): at 11:01

## 2019-04-02 NOTE — PROGRESS NOTE ADULT - ASSESSMENT
D/c VAC  Xeroform to perineal skin graft BID.  Keep tagaderm on Donor site.  OOB ambulate as tolerated

## 2019-04-02 NOTE — PHYSICAL THERAPY INITIAL EVALUATION ADULT - DISCHARGE DISPOSITION, PT EVAL
rehabilitation facility
LOBO
Patient would benefit from continued physical therapy  services to reach maximal potential in a Sub Acute Rehab facility/rehabilitation facility
LOBO/rehabilitation facility

## 2019-04-02 NOTE — PHYSICAL THERAPY INITIAL EVALUATION ADULT - PERTINENT HX OF CURRENT PROBLEM, REHAB EVAL
89 y/o M PMHx significant for hypertension, hyperlipidemia, meningioma, recurrent mechanical falls, and cervical myelopathy/radiculopathy presents to the ED for further evaluation of an unwitnessed fall last night. In the ED the patient was found to have notable skin tears to his right upper arm. The patient notes that the fall occurred upon ambulating to the bathroom and was unable to get up on his own.
88 y/o M PMHx significant for hypertension, hyperlipidemia, meningioma, recurrent mechanical falls, and cervical myelopathy/radiculopathy  admitted for fall.
88 y/o M PMHx significant for hypertension, hyperlipidemia, meningioma, recurrent mechanical falls, and cervical myelopathy/radiculopathy presents to the ED for further evaluation of an unwitnessed fall last night. In the ED the patient was found to have notable skin tears to his right upper arm. The patient notes that the fall occurred upon ambulating to the bathroom and was unable to get up on his own. +crotal gangrene gas and chalinoley underlying fascitis?
89 y/o M , s/p PPM, now reeval- PMHx significant for hypertension, hyperlipidemia, meningioma, recurrent mechanical falls, and cervical myelopathy/radiculopathy presents to the ED for further evaluation of an unwitnessed fall last night. In the ED the patient was found to have notable skin tears to his right upper arm. The patient notes that the fall occurred upon ambulating to the bathroom and was unable to get up on his own. +crotal gangrene gas and chalinoley underlying fascitis?

## 2019-04-02 NOTE — PROGRESS NOTE ADULT - SUBJECTIVE AND OBJECTIVE BOX
BM last night with staining of VAC.   VAC taken down    PE: good take of stsg to perineum. Donor site clean.

## 2019-04-02 NOTE — PHYSICAL THERAPY INITIAL EVALUATION ADULT - IMPAIRMENTS FOUND, PT EVAL
gait, locomotion, and balance/muscle strength
muscle strength/aerobic capacity/endurance/gait, locomotion, and balance
gross motor/gait, locomotion, and balance/aerobic capacity/endurance
poor safety awareness/gait, locomotion, and balance/posture

## 2019-04-02 NOTE — PROGRESS NOTE ADULT - ASSESSMENT
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   -ECHO - no signs of Endocarditis; with normal cardiac function and no significant valve disease.    -s/p debridement of perineum, scrotum and penile shaft 3/12  -s/p plastic surgery graft placement today 3/27 and with wound vac. will need to lay in bed for 5-7 days , and then we will remove wound vac and re-adress wound.   -nursing to get air mattress to prevent decubitus  -C/w local wound care as per Urology and plastic surgery  -C/w Zhou   -Urology appreciated -  remove zhou for trial of void once wound vac is removed  -s/p IV Zosyn and Augmentin   -Currently on IV Clindamycin Q8H     *JAMIE on CKD, improved   - pre-renal azotemia vs ATN  - S/p  IV hydration with Improvement of renal Fx  - Encourage oral hydration   - hold ACEI  - Monitor UO   - Restart Lasix     *AFIB with slow ventricular response  New DX?   Metoprolol held   [id number] score: 5, high risk   CArdio eval appreciated, no recommendation for A/c, will need to follow up   EP consult S/p MICRA placement    *Left upper ext DVT  -Sc lovenox full dose 80 q12h    *Soft tissue mass surrounding proximal aorta, suspicious for metastatic lymphadenopathy/Lymphoma   incidental finding on CT   No h/o malignancy as per Pt, but not following with PCP   CT abd/pelvis with IV contrast  done and confirms mass and also + infiltrative L kidney mass, suspicious for Lymphoma  D/w DR thakur will follow up for further recommendations     *HTN  BP improved, low normal now   Cont Hold meds:  Amlodipine 10mg po daily, Lisinopril 40mg po daily, Metoprolol XL 100mg po daily  restart lasix PO QD    *Severe Protein calorie Malnutrition. Hypoalbuminemia    encourage oral intake, supplements     *Generalized Edema  likely due to IV hydration and hypoalbuminemia   Doppler neg for DVT  Elevate extremities  Edema improved   S/P IV lasix continue Lasix PO QD    *mild allergic reaction to the tegaderm on skin where the graft wound is.   -improving   -give stat benadryl  -some mild skin allergy on right arm > hydrocortisone     Plan: patient s/p resection of scrotum and shaft of penis, and then subsequently went under another surgery for graft placement by plastics.  wound vac on graft site and on the scrotum. needs to be there for 7 days. wound vac to be removed on wednesday.   Patient has been gettign alof of fluids since admission, which have now been d/c, maybe contributing to genralized edematous state  and subsequwent water blisters forming.   c/w IV lasix for 2 -3 days.    DC plan will depend on if any further surgeries needed by plastic surgery team. 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   -ECHO - no signs of Endocarditis; with normal cardiac function and no significant valve disease.    -s/p debridement of perineum, scrotum and penile shaft 3/12  -s/p plastic surgery graft placement today 3/27 and with wound vac. will need to lay in bed for 5-7 days , and then we will remove wound vac and re-adress wound.   -nursing to get air mattress to prevent decubitus  -C/w local wound care as per Urology and plastic surgery  -C/w Zhou   -Urology consult appreciated -  remove zhou for trial of void once wound vac is removed  -s/p IV Zosyn and Augmentin   -Currently on IV Clindamycin Q8H by plastics    *JAMIE on CKD, improved   - pre-renal azotemia vs ATN  - S/p  IV hydration with Improvement of renal Fx  - Encourage oral hydration   - hold ACEI  - Monitor UO   - Restart Lasix PO QD with intermittent lasix IV    *AFIB with slow ventricular response  New DX?   Metoprolol held   [id number] score: 5, high risk   CArdio eval appreciated, no recommendation for A/c, will need to follow up   EP consult S/p MICRA placement    *Left upper ext DVT  -Sc lovenox full dose 80 q12h    *Soft tissue mass surrounding proximal aorta, suspicious for metastatic lymphadenopathy/Lymphoma   incidental finding on CT   No h/o malignancy as per Pt, but not following with PCP   CT abd/pelvis with IV contrast  done and confirms mass and also + infiltrative L kidney mass, suspicious for Lymphoma  [facility]  CT guided biopsy later this week or outpatient      *HTN  BP improved, low normal now   Cont Hold meds:  Amlodipine 10mg po daily, Lisinopril 40mg po daily, Metoprolol XL 100mg po daily  restart lasix PO QD    *Severe Protein calorie Malnutrition. Hypoalbuminemia    encourage oral intake, supplements     *Generalized Edema  likely due to IV hydration and hypoalbuminemia   Doppler neg for DVT  Elevate extremities  Edema improved   S/P IV lasix continue Lasix PO QD    *mild allergic reaction to the tegaderm on skin where the graft wound is.   -improving   -give stat benadryl  -some mild skin allergy on right arm > hydrocortisone     Plan: patient s/p resection of scrotum and shaft of penis, and then subsequently went under another surgery for graft placement by plastics.  wound vac on graft site and on the scrotum. needs to be there for 7 days. wound vac to be removed on wednesday.   Patient has been gettign alof of fluids since admission, which have now been d/c, maybe contributing to genralized edematous state  and subsequwent water blisters forming.   c/w IV lasix for 2 -3 days.    DC plan will depend on if any further surgeries needed by plastic surgery team.

## 2019-04-02 NOTE — PHYSICAL THERAPY INITIAL EVALUATION ADULT - RANGE OF MOTION EXAMINATION, REHAB EVAL
b/l DF to neutral, right knee 0-80 deg 2/2 pain./bilateral upper extremity ROM was WFL (within functional limits)/bilateral lower extremity ROM was WFL (within functional limits)
left knee reactive with full arom/aarom knee flexion/ extension./no ROM deficits were identified
left knee reactive with full arom/aarom knee flexion/ extension.

## 2019-04-02 NOTE — PHYSICAL THERAPY INITIAL EVALUATION ADULT - ASR WT BEARING STATUS EVAL
no weight-bearing restrictions

## 2019-04-02 NOTE — PHYSICAL THERAPY INITIAL EVALUATION ADULT - CRITERIA FOR SKILLED THERAPEUTIC INTERVENTIONS
impairments found/functional limitations in following categories
impairments found
impairments found
impairments found/functional limitations in following categories/risk reduction/prevention

## 2019-04-02 NOTE — PHYSICAL THERAPY INITIAL EVALUATION ADULT - MANUAL MUSCLE TESTING RESULTS, REHAB EVAL
grossly 3+/5
grossly 3+/5 t/o all extremities
all extremities grossly 3+/5 to WFL/WNL/grossly assessed due to
B UEs 4/5; B LEs 3-/5 at hips, 4/5 knees and ankles/grossly assessed due to

## 2019-04-02 NOTE — PHYSICAL THERAPY INITIAL EVALUATION ADULT - GENERAL OBSERVATIONS, REHAB EVAL
Patient received in bed in SDU, RN, CNA to assist with transfer. Patient denied pain. +IV, +Jay, +ICU monitors.
Pt received in bed, +Flowtrons, + zhou, NAD, but anxious to get OOB.
Pt rec'd supine in bed, mildly agitated, yelling at therapist and refusing to participate in mobility portion of exam.
The pt was pleasant and cooperative and eager to get OOB with PT, the pt was received on 2S, supine, listing heavily to the left sided. The pt had 1 IV and bilateral SCDs in place.

## 2019-04-02 NOTE — PHYSICAL THERAPY INITIAL EVALUATION ADULT - REHAB POTENTIAL, PT EVAL
fair, will monitor progress closely
good, to achieve stated therapy goals
fair, will monitor progress closely
good, to achieve stated therapy goals

## 2019-04-02 NOTE — PROGRESS NOTE ADULT - SUBJECTIVE AND OBJECTIVE BOX
HOSPITALIST PROGRESS NOTE:  SUBJECTIVE:  PCP: PCP; Dr. Sean Rodriguez  Neurosurgery; Dr. Billy Ortiz    Chief Complaint: Patient is a 89y old  Male who presents with a chief complaint of Recurrent Falls (01 Apr 2019 17:49)      HPI:  89 y/o M PMHx significant for hypertension, hyperlipidemia, meningioma, recurrent mechanical falls, and cervical myelopathy/radiculopathy presents to the ED for further evaluation of an unwitnessed fall last night. 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  patient s/p resection of scrotum and shaft of penis, and then subsequently went under another surgery for graft placement by plastics.  wound vac on graft site and on the scrotum. needs to be there for 7 days. wound vac to be removed on wednesday. Patient has been getting a lot of fluids since admission and found to be edematous with water blisters and was given IV lasix intermittently;     4/2:        Allergies:  No Known Allergies    REVIEW OF SYSTEMS:  See HPI. All other review of systems is negative unless indicated above.     OBJECTIVE  Physical Exam:  Vital Signs:    Vital Signs Last 24 Hrs  T(C): 36.9 (02 Apr 2019 04:58), Max: 36.9 (02 Apr 2019 04:58)  T(F): 98.4 (02 Apr 2019 04:58), Max: 98.4 (02 Apr 2019 04:58)  HR: 60 (02 Apr 2019 04:58) (60 - 61)  BP: 108/60 (02 Apr 2019 04:58) (108/60 - 116/69)  BP(mean): --  RR: 18 (01 Apr 2019 20:50) (18 - 18)  SpO2: 97% (02 Apr 2019 04:58) (97% - 99%)  I&O's Summary    01 Apr 2019 07:01  -  02 Apr 2019 07:00  --------------------------------------------------------  IN: 0 mL / OUT: 1800 mL / NET: -1800 mL        Constitutional: NAD, awake and alert, well-developed  Neurological: AAO x 3, no focal deficits  HEENT: PERRLA, EOMI, MMM  Neck: Soft and supple, No LAD, No JVD  Respiratory: Breath sounds are clear bilaterally, No wheezing, rales or rhonchi  Cardiovascular: S1 and S2, regular rate and rhythm; no Murmurs, gallops or rubs  Gastrointestinal: Bowel Sounds present, soft, nontender, nondistended, no guarding, no rebound tenderness  Back: No CVA tenderness   Extremities: No peripheral edema  Vascular: 2+ peripheral pulses  Musculoskeletal: 5/5 strength b/l upper and lower extremities  Skin: No rashes  Breast: Deferred  Rectal: Deferred    MEDICATIONS  (STANDING):  clindamycin IVPB 600 milliGRAM(s) IV Intermittent every 8 hours  enoxaparin Injectable 80 milliGRAM(s) SubCutaneous two times a day      LABS: All Labs Reviewed:      RADIOLOGY/EKG: HOSPITALIST PROGRESS NOTE:  SUBJECTIVE:  PCP: PCP; Dr. Sean Rodriguez  Neurosurgery; Dr. Billy Ortiz    Chief Complaint: Patient is a 89y old  Male who presents with a chief complaint of Recurrent Falls (01 Apr 2019 17:49)      HPI:  88 y/o M PMHx significant for hypertension, hyperlipidemia, meningioma, recurrent mechanical falls, and cervical myelopathy/radiculopathy presents to the ED for further evaluation of an unwitnessed fall last night. 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  patient s/p resection of scrotum and shaft of penis, and then subsequently went under another surgery for graft placement by plastics.  wound vac on graft site and on the scrotum. needs to be there for 7 days. wound vac to be removed on wednesday. Patient has been getting a lot of fluids since admission and found to be edematous with water blisters and was given IV lasix intermittently;     4/2:  Patient has no complaints. denies any pain    Allergies:  No Known Allergies    REVIEW OF SYSTEMS:  See HPI. All other review of systems is negative unless indicated above.     OBJECTIVE  Physical Exam:  Vital Signs:    Vital Signs Last 24 Hrs  T(C): 36.9 (02 Apr 2019 04:58), Max: 36.9 (02 Apr 2019 04:58)  T(F): 98.4 (02 Apr 2019 04:58), Max: 98.4 (02 Apr 2019 04:58)  HR: 60 (02 Apr 2019 04:58) (60 - 61)  BP: 108/60 (02 Apr 2019 04:58) (108/60 - 116/69)  BP(mean): --  RR: 18 (01 Apr 2019 20:50) (18 - 18)  SpO2: 97% (02 Apr 2019 04:58) (97% - 99%)  I&O's Summary    01 Apr 2019 07:01  -  02 Apr 2019 07:00  --------------------------------------------------------  IN: 0 mL / OUT: 1800 mL / NET: -1800 mL      Constitutional: NAD, awake and alert, well-developed  Neurological: AAO x 3, no focal deficits  HEENT: PERRLA, EOMI, MMM  Neck: Soft and supple, No LAD, No JVD  Respiratory: Breath sounds are clear bilaterally, No wheezing, rales or rhonchi  Cardiovascular: S1 and S2, regular rate and rhythm; no Murmurs, gallops or rubs  Gastrointestinal: Bowel Sounds present, soft, nontender, nondistended, no guarding, no rebound tenderness  Back: No CVA tenderness   :  wound vac dressing applied to genitalia, zhou draining yellow urine  Extremities: No peripheral edema  Vascular: 2+ peripheral pulses  Musculoskeletal: 5/5 strength b/l upper and lower extremities  Skin: No rashes  Breast: Deferred  Rectal: Deferred    MEDICATIONS  (STANDING):  clindamycin IVPB 600 milliGRAM(s) IV Intermittent every 8 hours  enoxaparin Injectable 80 milliGRAM(s) SubCutaneous two times a day      LABS: All Labs Reviewed:      RADIOLOGY/EKG:    < from: Xray Chest 1 View- PORTABLE-Urgent (03.22.19 @ 13:56) >    Impression:small BILATERAL pleural effusions with underlying atelectasis.   AICD is noted    < end of copied text >    < from: CT Abdomen and Pelvis w/ IV Cont (03.25.19 @ 11:36) >    IMPRESSION: Chest: Moderate bilateral pleural effusions. No evidence of   malignancy.  Abdomen/pelvis: Retroperitoneal soft tissue mass unchanged from prior CT   scan.  Infiltrate on mass in the left kidney. Constellation of findings is   suggestive of lymphoma.    < end of copied text >

## 2019-04-02 NOTE — PHYSICAL THERAPY INITIAL EVALUATION ADULT - PLANNED THERAPY INTERVENTIONS, PT EVAL
strengthening/transfer training/bed mobility training/gait training
gait training/balance training/bed mobility training/strengthening
gait training/transfer training/bed mobility training
transfer training/gait training/postural re-education/bed mobility training

## 2019-04-03 LAB
ANION GAP SERPL CALC-SCNC: 7 MMOL/L — SIGNIFICANT CHANGE UP (ref 5–17)
BUN SERPL-MCNC: 36 MG/DL — HIGH (ref 7–23)
CALCIUM SERPL-MCNC: 8.7 MG/DL — SIGNIFICANT CHANGE UP (ref 8.5–10.1)
CHLORIDE SERPL-SCNC: 102 MMOL/L — SIGNIFICANT CHANGE UP (ref 96–108)
CO2 SERPL-SCNC: 30 MMOL/L — SIGNIFICANT CHANGE UP (ref 22–31)
CREAT SERPL-MCNC: 0.96 MG/DL — SIGNIFICANT CHANGE UP (ref 0.5–1.3)
GLUCOSE SERPL-MCNC: 98 MG/DL — SIGNIFICANT CHANGE UP (ref 70–99)
HCT VFR BLD CALC: 28.9 % — LOW (ref 39–50)
HGB BLD-MCNC: 9.1 G/DL — LOW (ref 13–17)
MAGNESIUM SERPL-MCNC: 1.9 MG/DL — SIGNIFICANT CHANGE UP (ref 1.6–2.6)
MCHC RBC-ENTMCNC: 31.5 GM/DL — LOW (ref 32–36)
MCHC RBC-ENTMCNC: 31.7 PG — SIGNIFICANT CHANGE UP (ref 27–34)
MCV RBC AUTO: 100.7 FL — HIGH (ref 80–100)
NRBC # BLD: 0 /100 WBCS — SIGNIFICANT CHANGE UP (ref 0–0)
PHOSPHATE SERPL-MCNC: 3 MG/DL — SIGNIFICANT CHANGE UP (ref 2.5–4.5)
PLATELET # BLD AUTO: 388 K/UL — SIGNIFICANT CHANGE UP (ref 150–400)
POTASSIUM SERPL-MCNC: 4.8 MMOL/L — SIGNIFICANT CHANGE UP (ref 3.5–5.3)
POTASSIUM SERPL-SCNC: 4.8 MMOL/L — SIGNIFICANT CHANGE UP (ref 3.5–5.3)
RBC # BLD: 2.87 M/UL — LOW (ref 4.2–5.8)
RBC # FLD: 15.5 % — HIGH (ref 10.3–14.5)
SODIUM SERPL-SCNC: 139 MMOL/L — SIGNIFICANT CHANGE UP (ref 135–145)
WBC # BLD: 8.21 K/UL — SIGNIFICANT CHANGE UP (ref 3.8–10.5)
WBC # FLD AUTO: 8.21 K/UL — SIGNIFICANT CHANGE UP (ref 3.8–10.5)

## 2019-04-03 RX ORDER — FUROSEMIDE 40 MG
20 TABLET ORAL ONCE
Qty: 0 | Refills: 0 | Status: DISCONTINUED | OUTPATIENT
Start: 2019-04-03 | End: 2019-04-03

## 2019-04-03 RX ADMIN — ENOXAPARIN SODIUM 80 MILLIGRAM(S): 100 INJECTION SUBCUTANEOUS at 05:15

## 2019-04-03 RX ADMIN — OXYCODONE AND ACETAMINOPHEN 1 TABLET(S): 5; 325 TABLET ORAL at 23:23

## 2019-04-03 RX ADMIN — ENOXAPARIN SODIUM 80 MILLIGRAM(S): 100 INJECTION SUBCUTANEOUS at 17:49

## 2019-04-03 NOTE — PROGRESS NOTE ADULT - ASSESSMENT
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   -ECHO - no signs of Endocarditis; with normal cardiac function and no significant valve disease.    -s/p debridement of perineum, scrotum and penile shaft 3/12  -s/p plastic surgery graft placement today 3/27 and with wound vac. will need to lay in bed for 5-7 days , and then we will remove wound vac and re-adress wound.   -nursing to get air mattress to prevent decubitus  -C/w local wound care as per Urology and plastic surgery  -Urology consult appreciated -  remove zhou for trial of void once wound vac is removed  -s/p IV Zosyn, Augmentin and IV Clindamycin   -S/P Wound Vac  -plastics FU appreciated continue with Xeroform to perineal skin graft BID. Keep tagaderm on Donor site.  -Remove Zhou and TOV    *JAMIE on CKD, improved   - pre-renal azotemia vs ATN  - S/p  IV hydration with Improvement of renal Fx  - Encourage oral hydration   - hold ACEI  - Monitor UO   - Restart Lasix PO QD with intermittent lasix IV    *AFIB with slow ventricular response  New DX?   Metoprolol held   [id number] score: 5, high risk   CArdio eval appreciated, no recommendation for A/c, will need to follow up   EP consult S/p MICRA placement    *Left upper ext DVT  -Sc lovenox full dose 80 q12h    *Soft tissue mass surrounding proximal aorta, suspicious for metastatic lymphadenopathy/Lymphoma   incidental finding on CT   No h/o malignancy as per Pt, but not following with PCP   CT abd/pelvis with IV contrast  done and confirms mass and also + infiltrative L kidney mass, suspicious for Lymphoma  hemeonc consult appreciated - Possible  CT guided biopsy later this week or outpatient      *HTN  BP improved, low normal now   Cont Hold meds:  Amlodipine 10mg po daily, Lisinopril 40mg po daily, Metoprolol XL 100mg po daily  restart lasix PO QD    *Severe Protein calorie Malnutrition. Hypoalbuminemia    encourage oral intake, supplements     *Generalized Edema  likely due to IV hydration and hypoalbuminemia   Doppler neg for DVT  Elevate extremities  Edema improved   S/P IV lasix continue Lasix PO QD    *mild allergic reaction to the tegaderm on skin where the graft wound is.   -improving   -give stat benadryl  -some mild skin allergy on right arm > hydrocortisone     Plan: patient s/p resection of scrotum and shaft of penis, and then subsequently went under another surgery for graft placement by plastics.  wound vac on graft site and on the scrotum, now removed. Patient has been getting alot of fluids since admission, which have now been d/c, maybe contributing to genralized edematous state  and subsequwent water blisters forming.   c/w IV lasix for 2 -3 days.    DC plan will depend on if any further surgeries needed by plastic surgery team.

## 2019-04-03 NOTE — PROGRESS NOTE ADULT - SUBJECTIVE AND OBJECTIVE BOX
HOSPITALIST PROGRESS NOTE:  SUBJECTIVE:  PCP: PCP; Dr. Sean Rodriguez  Neurosurgery; Dr. Billy Ortiz    Chief Complaint: Patient is a 89y old  Male who presents with a chief complaint of Recurrent Falls (01 Apr 2019 17:49)    HPI:  88 y/o M PMHx significant for hypertension, hyperlipidemia, meningioma, recurrent mechanical falls, and cervical myelopathy/radiculopathy presents to the ED for further evaluation of an unwitnessed fall last night. 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  patient s/p resection of scrotum and shaft of penis, and then subsequently went under another surgery for graft placement by plastics.  wound vac on graft site and on the scrotum. needs to be there for 7 days. wound vac to be removed on wednesday. Patient has been getting a lot of fluids since admission and found to be edematous with water blisters and was given IV lasix intermittently;     4/2:  Patient has no complaints. denies any pain  4/3: No overnight events, since patient had staining ot he vac with stioool the vack was taken down; Seen by plastics and vac was removed;  less swelling S/P IV lasix yesterday; reiterated the mass around aorta and the possibility of malignancy/Lymphoma;  will need close FU as outpatient     Allergies:  No Known Allergies    REVIEW OF SYSTEMS:  See HPI. All other review of systems is negative unless indicated above.     OBJECTIVE  Physical Exam:  Vital Signs Last 24 Hrs  T(C): 36.5 (03 Apr 2019 10:36), Max: 36.8 (03 Apr 2019 05:13)  T(F): 97.7 (03 Apr 2019 10:36), Max: 98.2 (03 Apr 2019 05:13)  HR: 60 (03 Apr 2019 10:36) (60 - 60)  BP: 97/53 (03 Apr 2019 10:36) (97/53 - 108/55)  BP(mean): --  RR: 16 (03 Apr 2019 10:36) (16 - 18)  SpO2: 95% (03 Apr 2019 10:36) (95% - 99%)    Constitutional: NAD, awake and alert, well-developed  Neurological: AAO x 3, no focal deficits  HEENT: PERRLA, EOMI, MMM  Neck: Soft and supple, No LAD, No JVD  Respiratory: Breath sounds are clear bilaterally, No wheezing, rales or rhonchi  Cardiovascular: S1 and S2, regular rate and rhythm; no Murmurs, gallops or rubs  Gastrointestinal: Bowel Sounds present, soft, nontender, nondistended, no guarding, no rebound tenderness  Back: No CVA tenderness   : dressing applied to genitalia, zhou draining yellow urine  Extremities: No peripheral edema  Vascular: 2+ peripheral pulses  Musculoskeletal: 5/5 strength b/l upper and lower extremities  Skin: No rashes  Breast: Deferred  Rectal: Deferred    MEDICATIONS  (STANDING):  clindamycin IVPB 600 milliGRAM(s) IV Intermittent every 8 hours  enoxaparin Injectable 80 milliGRAM(s) SubCutaneous two times a day      LABS: All Labs Reviewed:      RADIOLOGY/EKG:    < from: Xray Chest 1 View- PORTABLE-Urgent (03.22.19 @ 13:56) >    Impression:small BILATERAL pleural effusions with underlying atelectasis.   AICD is noted    < end of copied text >    < from: CT Abdomen and Pelvis w/ IV Cont (03.25.19 @ 11:36) >    IMPRESSION: Chest: Moderate bilateral pleural effusions. No evidence of   malignancy.  Abdomen/pelvis: Retroperitoneal soft tissue mass unchanged from prior CT   scan.  Infiltrate on mass in the left kidney. Constellation of findings is   suggestive of lymphoma.    < end of copied text >

## 2019-04-03 NOTE — CHART NOTE - NSCHARTNOTEFT_GEN_A_CORE
Assessment:   Pt seen for follow up. Pt with long admission to hospital. Pt on admission screened for protein-calorie malnutrition. 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 Pt is s/p resection of scrotum and shaft and underwent graft placement by plastics. Pt's wound vac to be removed today. Pt also noted with overhydration and pt became edematous. Pt has been provided Lasix. Pt currently with 2+ general edema and right leg edema. stage II PU on right buttocks, SDTI on sacrum. Pt edwin 16. Pt reports PO intake is very good and pt is drinking ensure enlive TID. Pt noted with increased protein and calorie needs 2/2 wound healing. Pt overall continues to meet criteria for moderate protein-calorie malnutrition. C/w current nutrition care plan. Will continue to monitor PO intake    Recommendation  C/w current nutrition care plan       Diet Presciption: Diet, Dysphagia 2 Mechanical Soft-Thin Liquids (03-27-19 @ 11:10)      Wt Hx:  Height (cm): 175 (03-22-19 @ 11:44)  Weight (kg): 84 (03-22-19 @ 11:44)  BMI (kg/m2): 27.4 (03-22-19 @ 11:44)        Estimated Needs:   [ ] no change since previous assessment  Calories: 2100kcal-2500kcal  Protein: 100g-120g  Fluid 2100ml-2500ml      Nutrition Diagnosis is [x] ongoing  [ ] resolved [ ] not applicable   · Nutrition Diagnostic Terminology #1: Nutrient  · Nutrient: Malnutrition; pt meets criteria for moderate malnutrition in the context of chronic illness  · Etiology: inadequate PO intake 2/2 hx of dementia, adv age, hx of frequent falls.  · Signs/Symptoms: moderate muscle wasting, moderate fat depletion, PO intake <75% >1 month  · Nutrition Intervention: Meals and Snack; Medical Food Supplements; Feeding Assistance; Collaboration and Referral of Nutrition Care  · Meals and Snacks: General/healthful diet  · Medical and Food Supplements: Commercial beverage; ensure enlive 1x/day  · Feeding Assistance: Feeding Assistance; Meal set -up; Menu selection assistance; PRN  · Collaboration and Referral of Nutrition Care: Collaboration with other providers; SLP evaluation for recommended diet consistency        New Nutrition Diagnosis: [ ] not applicable         Pertinent Medications: MEDICATIONS  (STANDING):  enoxaparin Injectable 80 milliGRAM(s) SubCutaneous two times a day  furosemide    Tablet 20 milliGRAM(s) Oral daily    MEDICATIONS  (PRN):  acetaminophen   Tablet .. 650 milliGRAM(s) Oral every 6 hours PRN Temp greater or equal to 38C (100.4F), Mild Pain (1 - 3)  bisacodyl Suppository 10 milliGRAM(s) Rectal daily PRN Constipation  diphenhydrAMINE 50 milliGRAM(s) Oral every 6 hours PRN Rash and/or Itching  docusate sodium 100 milliGRAM(s) Oral three times a day PRN Constipation  hydrocortisone 1% Cream 1 Application(s) Topical three times a day PRN arm itching  morphine  - Injectable 2 milliGRAM(s) IV Push every 4 hours PRN Severe Pain (7 - 10)  oxyCODONE    5 mG/acetaminophen 325 mG 1 Tablet(s) Oral every 4 hours PRN Moderate Pain (4 - 6)    Pertinent Labs: 04-03 Na139 mmol/L Glu 98 mg/dL K+ 4.8 mmol/L Cr  0.96 mg/dL BUN 36 mg/dL<H> 04-03 Phos 3.0 mg/dL     CAPILLARY BLOOD GLUCOSE          Skin: edwin score =           Monitoring and Evaluation:   [x] PO intake/Nutr support infusion [ x ] Tolerance to Nutr [ x ] weights [ x ] labs[ x ] follow up per protocol  [ ] other:

## 2019-04-04 LAB
ANION GAP SERPL CALC-SCNC: 7 MMOL/L — SIGNIFICANT CHANGE UP (ref 5–17)
BUN SERPL-MCNC: 39 MG/DL — HIGH (ref 7–23)
CALCIUM SERPL-MCNC: 8.4 MG/DL — LOW (ref 8.5–10.1)
CHLORIDE SERPL-SCNC: 101 MMOL/L — SIGNIFICANT CHANGE UP (ref 96–108)
CO2 SERPL-SCNC: 30 MMOL/L — SIGNIFICANT CHANGE UP (ref 22–31)
CREAT SERPL-MCNC: 0.92 MG/DL — SIGNIFICANT CHANGE UP (ref 0.5–1.3)
GLUCOSE SERPL-MCNC: 101 MG/DL — HIGH (ref 70–99)
POTASSIUM SERPL-MCNC: 4.1 MMOL/L — SIGNIFICANT CHANGE UP (ref 3.5–5.3)
POTASSIUM SERPL-SCNC: 4.1 MMOL/L — SIGNIFICANT CHANGE UP (ref 3.5–5.3)
SODIUM SERPL-SCNC: 138 MMOL/L — SIGNIFICANT CHANGE UP (ref 135–145)

## 2019-04-04 RX ADMIN — ENOXAPARIN SODIUM 80 MILLIGRAM(S): 100 INJECTION SUBCUTANEOUS at 05:12

## 2019-04-04 RX ADMIN — ENOXAPARIN SODIUM 80 MILLIGRAM(S): 100 INJECTION SUBCUTANEOUS at 18:21

## 2019-04-04 RX ADMIN — Medication 20 MILLIGRAM(S): at 11:55

## 2019-04-04 RX ADMIN — OXYCODONE AND ACETAMINOPHEN 1 TABLET(S): 5; 325 TABLET ORAL at 00:01

## 2019-04-04 NOTE — PROGRESS NOTE ADULT - SUBJECTIVE AND OBJECTIVE BOX
HOSPITALIST PROGRESS NOTE:  SUBJECTIVE:  PCP: PCP; Dr. Sean Rodriguez  Neurosurgery; Dr. Billy Ortiz    Chief Complaint: Patient is a 89y old  Male who presents with a chief complaint of Recurrent Falls (01 Apr 2019 17:49)    HPI:  89 y/o M PMHx significant for hypertension, hyperlipidemia, meningioma, recurrent mechanical falls, and cervical myelopathy/radiculopathy presents to the ED for further evaluation of an unwitnessed fall last night. 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  patient s/p resection of scrotum and shaft of penis, and then subsequently went under another surgery for graft placement by plastics.  wound vac on graft site and on the scrotum. needs to be there for 7 days. wound vac to be removed on wednesday. Patient has been getting a lot of fluids since admission and found to be edematous with water blisters and was given IV lasix intermittently;     4/2:  Patient has no complaints. denies any pain  4/3: No overnight events, since patient had staining ot he vac with stioool the vack was taken down; Seen by plastics and vac was removed;  less swelling S/P IV lasix yesterday; reiterated the mass around aorta and the possibility of malignancy/Lymphoma;  will need close FU as outpatient   4/4:  Patient has no complaints. less swollen today.    Allergies:  No Known Allergies    REVIEW OF SYSTEMS:  See HPI. All other review of systems is negative unless indicated above.     OBJECTIVE  Physical Exam:  Vital Signs Last 24 Hrs  T(C): 36.8 (04 Apr 2019 11:13), Max: 36.8 (04 Apr 2019 11:13)  T(F): 98.3 (04 Apr 2019 11:13), Max: 98.3 (04 Apr 2019 11:13)  HR: 61 (04 Apr 2019 11:13) (59 - 61)  BP: 116/61 (04 Apr 2019 11:13) (92/54 - 118/72)  BP(mean): --  RR: 18 (04 Apr 2019 11:13) (15 - 18)  SpO2: 100% (04 Apr 2019 11:13) (97% - 100%)    Constitutional: NAD, awake and alert, well-developed  Neurological: AAO x 3, no focal deficits  HEENT: PERRLA, EOMI, MMM  Neck: Soft and supple, No LAD, No JVD  Respiratory: Breath sounds are clear bilaterally, No wheezing, rales or rhonchi  Cardiovascular: S1 and S2, regular rate and rhythm; no Murmurs, gallops or rubs  Gastrointestinal: Bowel Sounds present, soft, nontender, nondistended, no guarding, no rebound tenderness  Back: No CVA tenderness   : dressing applied to genitalia  Extremities: No peripheral edema  Vascular: 2+ peripheral pulses  Musculoskeletal: 5/5 strength b/l upper and lower extremities  Skin: No rashes  Breast: Deferred  Rectal: Deferred    MEDICATIONS  (STANDING):  clindamycin IVPB 600 milliGRAM(s) IV Intermittent every 8 hours  enoxaparin Injectable 80 milliGRAM(s) SubCutaneous two times a day    Lab Results:  CBC  CBC Full  -  ( 03 Apr 2019 06:30 )  WBC Count : 8.21 K/uL  RBC Count : 2.87 M/uL  Hemoglobin : 9.1 g/dL  Hematocrit : 28.9 %  Platelet Count - Automated : 388 K/uL  Mean Cell Volume : 100.7 fl  Mean Cell Hemoglobin : 31.7 pg  Mean Cell Hemoglobin Concentration : 31.5 gm/dL  Auto Neutrophil # : x  Auto Lymphocyte # : x  Auto Monocyte # : x  Auto Eosinophil # : x  Auto Basophil # : x  Auto Neutrophil % : x  Auto Lymphocyte % : x  Auto Monocyte % : x  Auto Eosinophil % : x  Auto Basophil % : x    .		Differential:	[] Automated		[] Manual  Chemistry                        9.1    8.21  )-----------( 388      ( 03 Apr 2019 06:30 )             28.9     04-04    138  |  101  |  39<H>  ----------------------------<  101<H>  4.1   |  30  |  0.92    Ca    8.4<L>      04 Apr 2019 07:20  Phos  3.0     04-03  Mg     1.9     04-03        RADIOLOGY/EKG:    < from: Xray Chest 1 View- PORTABLE-Urgent (03.22.19 @ 13:56) >    Impression:small BILATERAL pleural effusions with underlying atelectasis.   AICD is noted    < end of copied text >    < from: CT Abdomen and Pelvis w/ IV Cont (03.25.19 @ 11:36) >    IMPRESSION: Chest: Moderate bilateral pleural effusions. No evidence of   malignancy.  Abdomen/pelvis: Retroperitoneal soft tissue mass unchanged from prior CT   scan.  Infiltrate on mass in the left kidney. Constellation of findings is   suggestive of lymphoma.    < end of copied text >

## 2019-04-04 NOTE — PROGRESS NOTE ADULT - ASSESSMENT
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   -ECHO - no signs of Endocarditis; with normal cardiac function and no significant valve disease.    -s/p debridement of perineum, scrotum and penile shaft 3/12  -s/p plastic surgery graft placement today 3/27 and with wound vac. will need to lay in bed for 5-7 days , and then we will remove wound vac and re-adress wound.   -nursing to get air mattress to prevent decubitus  -C/w local wound care as per Urology and plastic surgery  -Urology consult appreciated -  remove zhou for trial of void once wound vac is removed  -s/p IV Zosyn, Augmentin and IV Clindamycin   -S/P Wound Vac  -plastics FU appreciated continue with Xeroform to perineal skin graft BID. Keep tagaderm on Donor site.  -Remove Zhou and TOV - PAtient is voiding on his own    *JAMIE on CKD, improved   - pre-renal azotemia vs ATN  - S/p  IV hydration with Improvement of renal Fx  - Encourage oral hydration   - hold ACEI  - Monitor UO   - S/P intermittent lasix IV  - Restart Lasix PO QD     *AFIB with slow ventricular response  New DX?   Metoprolol held   [id number] score: 5, high risk   CArdio eval appreciated, no recommendation for A/c, will need to follow up   EP consult S/p MICRA placement    *Left upper ext DVT  -Sc lovenox full dose 80 q12h    *Soft tissue mass surrounding proximal aorta, suspicious for metastatic lymphadenopathy/Lymphoma   incidental finding on CT   No h/o malignancy as per Pt, but not following with PCP   CT abd/pelvis with IV contrast  done and confirms mass and also + infiltrative L kidney mass, suspicious for Lymphoma  hemeonc consult appreciated - Possible  CT guided biopsy later this week or outpatient      *HTN  BP improved, low normal now   Cont Hold meds:  Amlodipine 10mg po daily, Lisinopril 40mg po daily, Metoprolol XL 100mg po daily  restart lasix PO QD    *Severe Protein calorie Malnutrition. Hypoalbuminemia    encourage oral intake, supplements     *Generalized Edema  likely due to IV hydration and hypoalbuminemia   Doppler neg for LE DVT  Elevate extremities  Edema improved   S/P IV lasix continue Lasix PO QD    *mild allergic reaction to the tegaderm on skin where the graft wound is.   -improving   -give stat benadryl  -some mild skin allergy on right arm > hydrocortisone     Plan: patient s/p resection of scrotum and shaft of penis, and then subsequently went under another surgery for graft placement by plastics.  wound vac on graft site and on the scrotum, now removed. Patient has been getting alot of fluids since admission, which have now been d/c, maybe contributing to generalized edematous state  and subsequent water blisters forming.   c/w IV lasix for 2 -3 days.    DC plan will depend on if any further surgeries needed by plastic surgery team.

## 2019-04-05 ENCOUNTER — APPOINTMENT (OUTPATIENT)
Dept: ELECTROPHYSIOLOGY | Facility: CLINIC | Age: 84
End: 2019-04-05

## 2019-04-05 ENCOUNTER — TRANSCRIPTION ENCOUNTER (OUTPATIENT)
Age: 84
End: 2019-04-05

## 2019-04-05 RX ORDER — HEPARIN SODIUM 5000 [USP'U]/ML
INJECTION INTRAVENOUS; SUBCUTANEOUS
Qty: 25000 | Refills: 0 | Status: DISCONTINUED | OUTPATIENT
Start: 2019-04-07 | End: 2019-04-08

## 2019-04-05 RX ORDER — ENOXAPARIN SODIUM 100 MG/ML
80 INJECTION SUBCUTANEOUS
Qty: 0 | Refills: 0 | COMMUNITY
Start: 2019-04-05

## 2019-04-05 RX ORDER — HYDROCORTISONE 1 %
1 OINTMENT (GRAM) TOPICAL
Qty: 0 | Refills: 0 | COMMUNITY
Start: 2019-04-05

## 2019-04-05 RX ORDER — LISINOPRIL 2.5 MG/1
1 TABLET ORAL
Qty: 0 | Refills: 0 | COMMUNITY

## 2019-04-05 RX ORDER — DOCUSATE SODIUM 100 MG
1 CAPSULE ORAL
Qty: 0 | Refills: 0 | COMMUNITY
Start: 2019-04-05

## 2019-04-05 RX ORDER — AMLODIPINE BESYLATE 2.5 MG/1
1 TABLET ORAL
Qty: 0 | Refills: 0 | COMMUNITY

## 2019-04-05 RX ORDER — HEPARIN SODIUM 5000 [USP'U]/ML
3000 INJECTION INTRAVENOUS; SUBCUTANEOUS EVERY 6 HOURS
Qty: 0 | Refills: 0 | Status: DISCONTINUED | OUTPATIENT
Start: 2019-04-07 | End: 2019-04-08

## 2019-04-05 RX ORDER — HEPARIN SODIUM 5000 [USP'U]/ML
6500 INJECTION INTRAVENOUS; SUBCUTANEOUS EVERY 6 HOURS
Qty: 0 | Refills: 0 | Status: DISCONTINUED | OUTPATIENT
Start: 2019-04-07 | End: 2019-04-08

## 2019-04-05 RX ORDER — METOPROLOL TARTRATE 50 MG
1 TABLET ORAL
Qty: 0 | Refills: 0 | COMMUNITY

## 2019-04-05 RX ADMIN — Medication 20 MILLIGRAM(S): at 11:56

## 2019-04-05 RX ADMIN — ENOXAPARIN SODIUM 80 MILLIGRAM(S): 100 INJECTION SUBCUTANEOUS at 17:30

## 2019-04-05 RX ADMIN — ENOXAPARIN SODIUM 80 MILLIGRAM(S): 100 INJECTION SUBCUTANEOUS at 05:34

## 2019-04-05 NOTE — PROGRESS NOTE ADULT - SUBJECTIVE AND OBJECTIVE BOX
HOSPITALIST PROGRESS NOTE:  SUBJECTIVE:  PCP: PCP; Dr. Sean Rodriguez  Neurosurgery; Dr. Billy Ortiz    Chief Complaint: Patient is a 89y old  Male who presents with a chief complaint of Recurrent Falls (01 Apr 2019 17:49)    HPI:  88 y/o M PMHx significant for hypertension, hyperlipidemia, meningioma, recurrent mechanical falls, and cervical myelopathy/radiculopathy presents to the ED for further evaluation of an unwitnessed fall last night. 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  patient s/p resection of scrotum and shaft of penis, and then subsequently went under another surgery for graft placement by plastics.  wound vac on graft site and on the scrotum. needs to be there for 7 days. wound vac to be removed on wednesday. Patient has been getting a lot of fluids since admission and found to be edematous with water blisters and was given IV lasix intermittently;     4/2:  Patient has no complaints. denies any pain  4/3: No overnight events, since patient had staining ot he vac with stioool the vack was taken down; Seen by plastics and vac was removed;  less swelling S/P IV lasix yesterday; reiterated the mass around aorta and the possibility of malignancy/Lymphoma;  will need close FU as outpatient   4/4:  Patient has no complaints. less swollen today.  4/5: NO overnight events; Patient has no complaints.     Allergies:  No Known Allergies    REVIEW OF SYSTEMS:  See HPI. All other review of systems is negative unless indicated above.     OBJECTIVE  Physical Exam:  Vital Signs Last 24 Hrs  T(C): 36.9 (05 Apr 2019 04:58), Max: 36.9 (05 Apr 2019 04:58)  T(F): 98.5 (05 Apr 2019 04:58), Max: 98.5 (05 Apr 2019 04:58)  HR: 61 (05 Apr 2019 04:58) (60 - 61)  BP: 115/51 (05 Apr 2019 04:58) (109/54 - 116/61)  BP(mean): --  RR: 17 (05 Apr 2019 04:58) (17 - 18)  SpO2: 99% (05 Apr 2019 04:58) (97% - 100%)    Constitutional: NAD, awake and alert, well-developed  Neurological: AAO x 3, no focal deficits  HEENT: PERRLA, EOMI, MMM  Neck: Soft and supple, No LAD, No JVD  Respiratory: Breath sounds are clear bilaterally, No wheezing, rales or rhonchi  Cardiovascular: S1 and S2, regular rate and rhythm; no Murmurs, gallops or rubs  Gastrointestinal: Bowel Sounds present, soft, nontender, nondistended, no guarding, no rebound tenderness  Back: No CVA tenderness   : dressing applied to genitalia  Extremities: No peripheral edema  Vascular: 2+ peripheral pulses  Musculoskeletal: 5/5 strength b/l upper and lower extremities  Skin: No rashes  Breast: Deferred  Rectal: Deferred    MEDICATIONS  (STANDING):  clindamycin IVPB 600 milliGRAM(s) IV Intermittent every 8 hours  enoxaparin Injectable 80 milliGRAM(s) SubCutaneous two times a day    Lab Results:  CBC    .		Differential:	[] Automated		[] Manual  Chemistry    04-04    138  |  101  |  39<H>  ----------------------------<  101<H>  4.1   |  30  |  0.92    Ca    8.4<L>      04 Apr 2019 07:20      RADIOLOGY RESULTS:    RADIOLOGY/EKG:    < from: Xray Chest 1 View- PORTABLE-Urgent (03.22.19 @ 13:56) >    Impression:small BILATERAL pleural effusions with underlying atelectasis.   AICD is noted    < end of copied text >    < from: CT Abdomen and Pelvis w/ IV Cont (03.25.19 @ 11:36) >    IMPRESSION: Chest: Moderate bilateral pleural effusions. No evidence of   malignancy.  Abdomen/pelvis: Retroperitoneal soft tissue mass unchanged from prior CT   scan.  Infiltrate on mass in the left kidney. Constellation of findings is   suggestive of lymphoma.    < end of copied text > HOSPITALIST PROGRESS NOTE:  SUBJECTIVE:  PCP: PCP; Dr. Sean Rodriguez  Neurosurgery; Dr. Billy Ortiz    Chief Complaint: Patient is a 89y old  Male who presents with a chief complaint of Recurrent Falls (01 Apr 2019 17:49)    HPI:  88 y/o M PMHx significant for hypertension, hyperlipidemia, meningioma, recurrent mechanical falls, and cervical myelopathy/radiculopathy presents to the ED for further evaluation of an unwitnessed fall last night. 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  patient s/p resection of scrotum and shaft of penis, and then subsequently went under another surgery for graft placement by plastics.  wound vac on graft site and on the scrotum. needs to be there for 7 days. wound vac to be removed on wednesday. Patient has been getting a lot of fluids since admission and found to be edematous with water blisters and was given IV lasix intermittently;     4/2:  Patient has no complaints. denies any pain  4/3: No overnight events, since patient had staining ot he vac with stioool the vack was taken down; Seen by plastics and vac was removed;  less swelling S/P IV lasix yesterday; reiterated the mass around aorta and the possibility of malignancy/Lymphoma;  will need close FU as outpatient   4/4:  Patient has no complaints. less swollen today.  4/5: NO overnight events; Patient has no complaints. patient upset with me about the possibility of cancer; unable to reach the family; Due to his noncompliance and poor fu it was decided that patient will stay for biopsy    Allergies:  No Known Allergies    REVIEW OF SYSTEMS:  See HPI. All other review of systems is negative unless indicated above.     OBJECTIVE  Physical Exam:  Vital Signs Last 24 Hrs  T(C): 36.8 (05 Apr 2019 10:45), Max: 36.9 (05 Apr 2019 04:58)  T(F): 98.3 (05 Apr 2019 10:45), Max: 98.5 (05 Apr 2019 04:58)  HR: 60 (05 Apr 2019 10:45) (60 - 61)  BP: 110/42 (05 Apr 2019 10:45) (109/54 - 115/51)  BP(mean): --  RR: 18 (05 Apr 2019 10:45) (17 - 18)  SpO2: 97% (05 Apr 2019 10:45) (97% - 99%)    Constitutional: NAD, awake and alert, well-developed  Neurological: AAO x 3, no focal deficits  HEENT: PERRLA, EOMI, MMM  Neck: Soft and supple, No LAD, No JVD  Respiratory: Breath sounds are clear bilaterally, No wheezing, rales or rhonchi  Cardiovascular: S1 and S2, regular rate and rhythm; no Murmurs, gallops or rubs  Gastrointestinal: Bowel Sounds present, soft, nontender, nondistended, no guarding, no rebound tenderness  Back: No CVA tenderness   : dressing applied to genitalia  Extremities: No peripheral edema  Vascular: 2+ peripheral pulses  Musculoskeletal: 5/5 strength b/l upper and lower extremities  Skin: No rashes  Breast: Deferred  Rectal: Deferred    MEDICATIONS  (STANDING):  clindamycin IVPB 600 milliGRAM(s) IV Intermittent every 8 hours  enoxaparin Injectable 80 milliGRAM(s) SubCutaneous two times a day    Lab Results:  CBC    .		Differential:	[] Automated		[] Manual  Chemistry    04-04    138  |  101  |  39<H>  ----------------------------<  101<H>  4.1   |  30  |  0.92    Ca    8.4<L>      04 Apr 2019 07:20      RADIOLOGY RESULTS:    RADIOLOGY/EKG:    < from: Xray Chest 1 View- PORTABLE-Urgent (03.22.19 @ 13:56) >    Impression:small BILATERAL pleural effusions with underlying atelectasis.   AICD is noted    < end of copied text >    < from: CT Abdomen and Pelvis w/ IV Cont (03.25.19 @ 11:36) >    IMPRESSION: Chest: Moderate bilateral pleural effusions. No evidence of   malignancy.  Abdomen/pelvis: Retroperitoneal soft tissue mass unchanged from prior CT   scan.  Infiltrate on mass in the left kidney. Constellation of findings is   suggestive of lymphoma.    < end of copied text >

## 2019-04-05 NOTE — DISCHARGE NOTE PROVIDER - HOSPITAL COURSE
HOSPITALIST PROGRESS NOTE:    SUBJECTIVE:    PCP: PCP; Dr. Sean Rodriguez    Neurosurgery; Dr. Billy Ortiz        Chief Complaint: Patient is a 89y old  Male who presents with a chief complaint of Recurrent Falls (01 Apr 2019 17:49)        HPI:    89 y/o M PMHx significant for hypertension, hyperlipidemia, meningioma, recurrent mechanical falls, and cervical myelopathy/radiculopathy presents to the ED for further evaluation of an unwitnessed fall last night. 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  patient s/p resection of scrotum and shaft of penis, and then subsequently went under another surgery for graft placement by plastics.  wound vac on graft site and on the scrotum. needs to be there for 7 days. wound vac to be removed on wednesday. Patient has been getting a lot of fluids since admission and found to be edematous with water blisters and was given IV lasix intermittently;     *S/p Fall    *Elevated TSH    - mechanical vs syncope most likely 2ndry to Tachy-evelyn syndrome    - telemetry: AFIB with bradycardia and pauses     - EP eval appreciated S/P MICRA placement 3/22     - TSH elevated, T3/T4: WNL, likely sick euthyroid vs subclinical, will need repeat labs in few weeks     - fall precautions    - EP eval appreciated has alma rosa for 4/5 for wound check; If pt becomes tachycardic, may resume b-blockers for rate control. 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     -ECHO - no signs of Endocarditis; with normal cardiac function and no significant valve disease.      -s/p debridement of perineum, scrotum and penile shaft 3/12    -s/p plastic surgery graft placement today 3/27 and with wound vac. will need to lay in bed for 5-7 days , and then we will remove wound vac and re-adress wound.     -nursing to get air mattress to prevent decubitus    -C/w local wound care as per Urology and plastic surgery    -Urology consult appreciated -  remove zhou for trial of void once wound vac is removed    -s/p IV Zosyn, Augmentin and IV Clindamycin     -S/P Wound Vac    -plastics FU appreciated continue with Xeroform to perineal skin graft BID. Keep tagaderm on Donor site.    -Remove Zhou and TOV - PAtient is voiding on his own        *JAMIE on CKD, improved     - pre-renal azotemia vs ATN    - S/p  IV hydration with Improvement of renal Fx    - Encourage oral hydration     - hold ACEI    - Monitor UO     - S/P intermittent lasix IV    - Restart Lasix PO QD         *AFIB with slow ventricular response    New DX?     Metoprolol held     [id number] score: 5, high risk     CArdio eval appreciated, no recommendation for A/c, will need to follow up     EP consult S/p MICRA placement        *Left upper ext DVT    -Sc lovenox full dose 80 q12h        *Soft tissue mass surrounding proximal aorta, suspicious for metastatic lymphadenopathy/Lymphoma     incidental finding on CT     No h/o malignancy as per Pt, but not following with PCP     CT abd/pelvis with IV contrast  done and confirms mass and also + infiltrative L kidney mass, suspicious for Lymphoma    hemeonc consult appreciated - Possible  CT guided biopsy later this week or outpatient          *HTN    BP improved, low normal now     Cont Hold meds:  Amlodipine 10mg po daily, Lisinopril 40mg po daily, Metoprolol XL 100mg po daily    restart lasix PO QD        *Severe Protein calorie Malnutrition. Hypoalbuminemia      encourage oral intake, supplements         *Generalized Edema - resolving     likely due to IV hydration and hypoalbuminemia     Doppler neg for LE DVT    Elevate extremities    Edema improved     S/P IV lasix continue Lasix PO QD        *mild allergic reaction to the tegaderm on skin where the graft wound is.     -improving     -give stat benadryl    -some mild skin allergy on right arm > hydrocortisone HOSPITALIST PROGRESS NOTE:    SUBJECTIVE:    PCP: PCP; Dr. Sean Rodriguez    Neurosurgery; Dr. Billy Ortiz        Chief Complaint: Patient is a 89y old  Male who presents with a chief complaint of Recurrent Falls (01 Apr 2019 17:49)        HPI:    89 y/o M PMHx significant for hypertension, hyperlipidemia, meningioma, recurrent mechanical falls, and cervical myelopathy/radiculopathy presents to the ED for further evaluation of an unwitnessed fall last night. 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  patient s/p resection of scrotum and shaft of penis, and then subsequently went under another surgery for graft placement by plastics.  wound vac on graft site and on the scrotum. needs to be there for 7 days. wound vac to be removed on wednesday. Patient has been getting a lot of fluids since admission and found to be edematous with water blisters and was given IV lasix intermittently;     *S/p Fall    *Elevated TSH    - mechanical vs syncope most likely 2ndry to Tachy-evelyn syndrome    - telemetry: AFIB with bradycardia and pauses     - EP eval appreciated S/P MICRA placement 3/22     - TSH elevated, T3/T4: WNL, likely sick euthyroid vs subclinical, will need repeat labs in few weeks     - fall precautions    - EP eval appreciated has alma rosa for 4/5 for wound check; If pt becomes tachycardic, may resume b-blockers for rate control. 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     -ECHO - no signs of Endocarditis; with normal cardiac function and no significant valve disease.      -s/p debridement of perineum, scrotum and penile shaft 3/12    -s/p plastic surgery graft placement today 3/27 and with wound vac. will need to lay in bed for 5-7 days , and then we will remove wound vac and re-adress wound.     -nursing to get air mattress to prevent decubitus    -C/w local wound care as per Urology and plastic surgery    -Urology consult appreciated -  remove zhou for trial of void once wound vac is removed    -s/p IV Zosyn, Augmentin and IV Clindamycin     -S/P Wound Vac    -plastics FU appreciated continue with Xeroform to perineal skin graft BID. Keep tagaderm on Donor site.    -Remove Zhou and TOV - PAtient is voiding on his own        *JAMIE on CKD, improved     - pre-renal azotemia vs ATN    - S/p  IV hydration with Improvement of renal Fx    - Encourage oral hydration     - hold ACEI    - Monitor UO     - S/P intermittent lasix IV    - Restart Lasix PO QD         *AFIB with slow ventricular response    New DX?     Metoprolol held     [id number] score: 5, high risk     CArdio eval appreciated, no recommendation for A/c, will need to follow up     EP consult S/p MICRA placement        *Left upper ext DVT    -Sc lovenox full dose 80 q12h        *Soft tissue mass surrounding proximal aorta, suspicious for metastatic lymphadenopathy/Lymphoma     incidental finding on CT     No h/o malignancy as per Pt, but not following with PCP     CT abd/pelvis with IV contrast  done and confirms mass and also + infiltrative L kidney mass, suspicious for Lymphoma    hemeonc consult appreciated - Possible  CT guided biopsy later this week or outpatient          *HTN    BP improved, low normal now     Cont Hold meds:  Amlodipine 10mg po daily, Lisinopril 40mg po daily, Metoprolol XL 100mg po daily    restart lasix PO QD        *Severe Protein calorie Malnutrition. Hypoalbuminemia      encourage oral intake, supplements         *Generalized Edema - resolving     likely due to IV hydration and hypoalbuminemia     Doppler neg for LE DVT    Elevate extremities    Edema improved     S/P IV lasix continue Lasix PO QD        *mild allergic reaction to the tegaderm on skin where the graft wound is.     -improving     -give stat benadryl    -some mild skin allergy on right arm > hydrocortisone         DISPOSITION    Spoke with Dr Rosen and he no longer is at [facility]  He advised that patient [facility] as he will not travel [facility]. Called [facility] [phone] and left a message to have someone call me back; patient was recently D/C paper work was also faxed; Fax Number [phone] Vital Signs Last 24 Hrs    T(C): 36.9 (09 Apr 2019 11:06), Max: 36.9 (08 Apr 2019 16:48)    T(F): 98.4 (09 Apr 2019 11:06), Max: 98.5 (08 Apr 2019 16:48)    HR: 60 (09 Apr 2019 11:06) (60 - 61)    BP: 108/57 (09 Apr 2019 11:06) (107/56 - 127/65)    BP(mean): --    RR: 18 (09 Apr 2019 11:06) (17 - 19)    SpO2: 100% (09 Apr 2019 11:06) (95% - 100%)        HEENT:   pupils equal and reactive, EOMI, no oropharyngeal lesions, erythema, exudates, oral thrush        NECK:   supple, no carotid bruits, no palpable lymph nodes, no thyromegaly        CV:  +S1, +S2, regular, no murmurs or rubs        RESP:   lungs clear to auscultation bilaterally, no wheezing, rales, rhonchi, good air entry bilaterally        BREAST:  not examined        GI:  abdomen soft, non-tender, non-distended, normal BS, no bruits, no abdominal masses, no palpable masses        RECTAL:  not examined        :  not examined        MSK:   normal muscle tone, no atrophy, no rigidity, no contractions        EXT:   no clubbing, no cyanosis, no edema, no calf pain, swelling or erythema        VASCULAR:  pulses equal and symmetric in the upper and lower extremities        NEURO:  AAOX3, no focal neurological deficits, follows all commands, able to move extremities spontaneously        SKIN:  no ulcers, lesions or rashes            PTT - ( 09 Apr 2019 08:24 )  PTT:87.5 secCBC Full  -  ( 09 Apr 2019 08:24 )    WBC Count : 8.17 K/uL    Hemoglobin : 9.2 g/dL    Hematocrit : 29.4 %    Platelet Count - Automated : 346 K/uL    Mean Cell Volume : 103.2 fl    Mean Cell Hemoglobin : 32.3 pg    Mean Cell Hemoglobin Concentration : 31.3 gm/dL                Hospital Course:        88 y/o M PMHx significant for hypertension, hyperlipidemia, meningioma, recurrent mechanical falls, and cervical myelopathy/radiculopathy presents to the ED for further evaluation of an unwitnessed fall last night. 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      Sepsis with bacteroides in Blood,  Cellulitis with proteus/E coli/ Bacteroides, UTI. patient s/p resection of scrotum and shaft of penis, and then subsequently went under another surgery for graft placement by plastics and then had wound vac for 7 days. Patient now has removal of wound vac and dressing placed. Would recommend    patient continue with condom catheter placement to keep the area clean until surgical site is completely healed.     Plastics FU appreciated continue with Xeroform to perineal skin graft BID. Keep tagaderm on Donor site. Patient will need to follow up outpatient in one week with Plastic surgery to ensure that     the wound is healing properly, Clive Smith .             Other issues while inpatient:            1-AFIB with slow ventricular response    EP consult S/p MICRA placement    Patient now on Eliquis for the DVT. afib was transient and self resolved. long term use of eliquis after completed treatment for the dvt will need to be re-evaluated by cardiology on outpatient        2-Left upper ext DVT    -Sc lovenox full dose 80 q12h        3-Incidential finding:Soft tissue mass surrounding proximal aorta, suspicious for metastatic lymphadenopathy/Lymphoma     incidental finding on CT     No h/o malignancy as per Pt, but not following with PCP     CT abd/pelvis with IV contrast  done and confirms mass and also + infiltrative L kidney mass, suspicious for Lymphoma    hemeonc consult appreciated ; Patient is now s/p biopsy of mass.    Patient will need to follow up with hem/onc Tisha Hyatt) in one week to get results and then subsequently with Neurosurgery; Dr. Billy Ortiz IF further surgical intervention is needed.

## 2019-04-05 NOTE — DISCHARGE NOTE NURSING/CASE MANAGEMENT/SOCIAL WORK - NSDCDPATPORTLINK_GEN_ALL_CORE
You can access the [facility], offered by [facility], by registering with the following website: [email or web address]

## 2019-04-05 NOTE — DISCHARGE NOTE PROVIDER - PROVIDER TOKENS
FREE:[LAST:[FU with PCP],PHONE:[(   )    -],FAX:[(   )    -]],PROVIDER:[TOKEN:[[id number]]],PROVIDER:[TOKEN:[[id number]]] PROVIDER:[TOKEN:[[id number]]],PROVIDER:[TOKEN:[[id number]]],FREE:[LAST:[FU with PCP],PHONE:[(   )    -],FAX:[(   )    -]],PROVIDER:[TOKEN:[[id number]]] PROVIDER:[TOKEN:[[id number]]],PROVIDER:[TOKEN:[[id number]]],PROVIDER:[TOKEN:[[id number]]]

## 2019-04-05 NOTE — DISCHARGE NOTE PROVIDER - NSDCCPCAREPLAN_GEN_ALL_CORE_FT
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   -completed ABX treatment   -s/p debridement of perineum, scrotum and penile shaft 3/12  -s/p plastic surgery graft placement 3/27  -continue with Xeroform to perineal skin graft BID. Keep tagaderm on Donor site.  -Fu with plastics      SECONDARY DISCHARGE DIAGNOSES  Diagnosis: Tachy-evelyn syndrome  Assessment and Plan of Treatment: AFIB  S/P S/p MICRA placement  FU with EP    Diagnosis: Lymphadenopathy  Assessment and Plan of Treatment: Soft tissue mass surrounding proximal aorta, suspicious for metastatic lymphadenopathy/Lymphoma   will need to FU with [facility] for Ct guided biopsy      Diagnosis: Deep vein thrombosis (DVT)  Assessment and Plan of Treatment: left Upper Ext DVT  Sc lovenox full dose 80 q12h; hold off on coumadin till a ct guided biopsy of mass is done as outpatient    Diagnosis: JAMIE (acute kidney injury)  Assessment and Plan of Treatment: Stop lisinopril  continue lasix  monitor Bun/Cr PRINCIPAL DISCHARGE DIAGNOSIS  Diagnosis: Bacteremia  Assessment and Plan of Treatment: 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   -completed ABX treatment   -s/p debridement of perineum, scrotum and penile shaft 3/12  -s/p plastic surgery graft placement 3/27  -continue with Xeroform to perineal skin graft BID. Keep tagaderm on Donor site.  -Fu with plastics in one week, f/u with hem/oncology in one week for biopsy results of  periaortic mass.      SECONDARY DISCHARGE DIAGNOSES  Diagnosis: Tachy-evelyn syndrome  Assessment and Plan of Treatment: AFIB  S/P S/p MICRA placement  FU with EP    Diagnosis: Lymphadenopathy  Assessment and Plan of Treatment: Soft tissue mass surrounding proximal aorta, suspicious for metastatic lymphadenopathy/Lymphoma   [facility]      Diagnosis: Deep vein thrombosis (DVT)  Assessment and Plan of Treatment: left Upper Ext DVT  Start Eliquis 5mg BID

## 2019-04-05 NOTE — PROGRESS NOTE ADULT - ASSESSMENT
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   -ECHO - no signs of Endocarditis; with normal cardiac function and no significant valve disease.    -s/p debridement of perineum, scrotum and penile shaft 3/12  -s/p plastic surgery graft placement today 3/27 and with wound vac. will need to lay in bed for 5-7 days , and then we will remove wound vac and re-adress wound.   -nursing to get air mattress to prevent decubitus  -C/w local wound care as per Urology and plastic surgery  -Urology consult appreciated -  remove zhou for trial of void once wound vac is removed  -s/p IV Zosyn, Augmentin and IV Clindamycin   -S/P Wound Vac  -plastics FU appreciated continue with Xeroform to perineal skin graft BID. Keep tagaderm on Donor site.  -Remove Zhou and TOV - PAtient is voiding on his own    *JAMIE on CKD, improved   - pre-renal azotemia vs ATN  - S/p  IV hydration with Improvement of renal Fx  - Encourage oral hydration   - hold ACEI  - Monitor UO   - S/P intermittent lasix IV  - Restart Lasix PO QD     *AFIB with slow ventricular response  New DX?   Metoprolol held   [id number] score: 5, high risk   CArdio eval appreciated, no recommendation for A/c, will need to follow up   EP consult S/p MICRA placement    *Left upper ext DVT  -Sc lovenox full dose 80 q12h    *Soft tissue mass surrounding proximal aorta, suspicious for metastatic lymphadenopathy/Lymphoma   incidental finding on CT   No h/o malignancy as per Pt, but not following with PCP   CT abd/pelvis with IV contrast  done and confirms mass and also + infiltrative L kidney mass, suspicious for Lymphoma  hemeonc consult appreciated - Possible  CT guided biopsy later this week or outpatient      *HTN  BP improved, low normal now   Cont Hold meds:  Amlodipine 10mg po daily, Lisinopril 40mg po daily, Metoprolol XL 100mg po daily  restart lasix PO QD    *Severe Protein calorie Malnutrition. Hypoalbuminemia    encourage oral intake, supplements     *Generalized Edema - resolving   likely due to IV hydration and hypoalbuminemia   Doppler neg for LE DVT  Elevate extremities  Edema improved   S/P IV lasix continue Lasix PO QD    *mild allergic reaction to the tegaderm on skin where the graft wound is.   -improving   -give stat benadryl  -some mild skin allergy on right arm > hydrocortisone     Plan: patient s/p resection of scrotum and shaft of penis, and then subsequently went under another surgery for graft placement by plastics.  wound vac on graft site and on the scrotum, now removed. 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   -ECHO - no signs of Endocarditis; with normal cardiac function and no significant valve disease.    -s/p debridement of perineum, scrotum and penile shaft 3/12  -s/p plastic surgery graft placement today 3/27 and with wound vac. will need to lay in bed for 5-7 days , and then we will remove wound vac and re-adress wound.   -nursing to get air mattress to prevent decubitus  -C/w local wound care as per Urology and plastic surgery  -Urology consult appreciated -  remove zhou for trial of void once wound vac is removed  -s/p IV Zosyn, Augmentin and IV Clindamycin   -S/P Wound Vac  -plastics FU appreciated continue with Xeroform to perineal skin graft BID. Keep tagaderm on Donor site.  -Remove Zhou and TOV - PAtient is voiding on his own    *JAMIE on CKD, improved   - pre-renal azotemia vs ATN  - S/p  IV hydration with Improvement of renal Fx  - Encourage oral hydration   - hold ACEI  - Monitor UO   - S/P intermittent lasix IV  - Restart Lasix PO QD     *AFIB with slow ventricular response - New DX?   -Metoprolol held   -[id number] score: 5, high risk   -CArdio eval appreciated, no recommendation for A/c, will need to follow up   -EP consult S/p MICRA placement    *Left upper ext DVT  -Sc lovenox full dose 80 q12h    *Soft tissue mass surrounding proximal aorta, suspicious for metastatic lymphadenopathy/Lymphoma incidental finding on CT   -No h/o malignancy as per Pt, but not following with PCP   -CT abd/pelvis with IV contrast  done and confirms mass and also + infiltrative L kidney mass, suspicious for Lymphoma  -hemeon consult appreciated - Discussed wiht Dr Marin Due to his noncompliance and poor fu it was decided that patient will stay for biopsy; D/C was cancelled   -CT guided biopsy on Monday by IR. Discussed with IR - Dr Jhaveri Hold Lovenox X 24 H; last dose of lovenox will be on sat evening and Heparin Drip to be started on 4/7 at 6am     *HTN  BP improved, low normal now   -Cont Hold meds:  Amlodipine 10mg po daily, Lisinopril 40mg po daily, Metoprolol XL 100mg po daily  -restart lasix PO QD    *Severe Protein calorie Malnutrition. Hypoalbuminemia    -encourage oral intake, supplements     *Generalized Edema - resolving   -likely due to IV hydration and hypoalbuminemia   -Doppler neg for LE DVT  -Elevate extremities  -Edema improved   -S/P IV lasix continue Lasix PO QD    *mild allergic reaction to the tegaderm on skin where the graft wound is.   -improving   -some mild skin allergy on right arm > hydrocortisone     Plan: patient s/p resection of scrotum and shaft of penis, and then subsequently went under another surgery for graft placement by plastics.  wound vac on graft site and on the scrotum, now removed. CT guided biopsy on Monday by IR. Hold Lovenox X 24 H prior to procedure; last dose of lovenox will be on sat evening and Heparin Drip to be started on 4/7 at 6am

## 2019-04-05 NOTE — DISCHARGE NOTE PROVIDER - CARE PROVIDERS DIRECT ADDRESSES
,DirectAddress_Unknown,[email or web address],DirectAddress_Unknown ,[email or web address],DirectAddress_Unknown,DirectAddress_Unknown,[email or web address],[email or web address],[email or web address]

## 2019-04-05 NOTE — DISCHARGE NOTE PROVIDER - CARE PROVIDER_API CALL
FU with PCP,   Phone: (   )    -  Fax: (   )    -  Follow Up Time:     Tisha Espino)  Hematology; Medical Oncology  [address] [facility], [place] [address]  Phone: [phone]  Fax: [phone]  Follow Up Time:     Clive Kumar (MD)  Plastic Surgery  [address] [facility], [address]  [place] [address]  Phone: [phone]  Fax: [phone]  Follow Up Time: Tisha Espino)  Hematology; Medical Oncology  [address] [facility], [place] [address]  Phone: [phone]  Fax: [phone]  Follow Up Time:     Clive Kumar)  Plastic Surgery  [address] [facility], [address]  [place] [address]  Phone: [phone]  Fax: [phone]  Follow Up Time:     FU with PCP,   Phone: (   )    -  Fax: (   )    -  Follow Up Time:     Bashir Lomas)  Internal Medicine  [address] [facility], [address]  [place] [address]  Phone: [phone]  Fax: [phone]  Follow Up Time: Tisha Espino (MD)  Hematology; Medical Oncology  [address] [facility], [place] [address]  Phone: [phone]  Fax: [phone]  Follow Up Time:     Clive Kumar (MD)  Plastic Surgery  [address] [facility], [address]  [place] [address]  Phone: [phone]  Fax: [phone]  Follow Up Time:     Billy Ortiz; PhD)  Neurosurgery  [address] [facility], 2nd [place] [address]  Phone: [phone]  Fax: [phone]  Follow Up Time:

## 2019-04-06 RX ADMIN — ENOXAPARIN SODIUM 80 MILLIGRAM(S): 100 INJECTION SUBCUTANEOUS at 17:43

## 2019-04-06 RX ADMIN — Medication 20 MILLIGRAM(S): at 11:47

## 2019-04-06 RX ADMIN — ENOXAPARIN SODIUM 80 MILLIGRAM(S): 100 INJECTION SUBCUTANEOUS at 05:42

## 2019-04-06 NOTE — PROGRESS NOTE ADULT - SUBJECTIVE AND OBJECTIVE BOX
HOSPITALIST PROGRESS NOTE:  SUBJECTIVE:  PCP: PCP; Dr. Sean Rodriguez  Neurosurgery; Dr. Billy Ortiz    Chief Complaint: Patient is a 89y old  Male who presents with a chief complaint of Recurrent Falls (01 Apr 2019 17:49)    HPI:  89 y/o M PMHx significant for hypertension, hyperlipidemia, meningioma, recurrent mechanical falls, and cervical myelopathy/radiculopathy presents to the ED for further evaluation of an unwitnessed fall last night. 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  patient s/p resection of scrotum and shaft of penis, and then subsequently went under another surgery for graft placement by plastics.  wound vac on graft site and on the scrotum. needs to be there for 7 days. wound vac to be removed on wednesday. Patient has been getting a lot of fluids since admission and found to be edematous with water blisters and was given IV lasix intermittently;     4/2:  Patient has no complaints. denies any pain  4/3: No overnight events, since patient had staining ot he vac with stioool the vack was taken down; Seen by plastics and vac was removed;  less swelling S/P IV lasix yesterday; reiterated the mass around aorta and the possibility of malignancy/Lymphoma;  will need close FU as outpatient   4/4:  Patient has no complaints. less swollen today.  4/5: NO overnight events; Patient has no complaints. patient upset with me about the possibility of cancer; unable to reach the family; Due to his noncompliance and poor fu it was decided that patient will stay for biopsy  4/6:  Patient has no complaints.  agreeing for Biopsy; No other issues     Allergies:  No Known Allergies    REVIEW OF SYSTEMS:  See HPI. All other review of systems is negative unless indicated above.     OBJECTIVE  Physical Exam:  Vital Signs Last 24 Hrs  T(C): 37.1 (06 Apr 2019 10:51), Max: 37.1 (06 Apr 2019 10:51)  T(F): 98.8 (06 Apr 2019 10:51), Max: 98.8 (06 Apr 2019 10:51)  HR: 60 (06 Apr 2019 10:51) (59 - 60)  BP: 116/59 (06 Apr 2019 10:51) (103/53 - 120/55)  BP(mean): --  RR: 18 (06 Apr 2019 10:51) (18 - 18)  SpO2: 99% (06 Apr 2019 10:51) (99% - 100%)    Constitutional: NAD, awake and alert, well-developed  Neurological: AAO x 3, no focal deficits  HEENT: PERRLA, EOMI, MMM  Neck: Soft and supple, No LAD, No JVD  Respiratory: Breath sounds are clear bilaterally, No wheezing, rales or rhonchi  Cardiovascular: S1 and S2, regular rate and rhythm; no Murmurs, gallops or rubs  Gastrointestinal: Bowel Sounds present, soft, nontender, nondistended, no guarding, no rebound tenderness  Back: No CVA tenderness   : dressing applied to genitalia  Extremities: No peripheral edema  Vascular: 2+ peripheral pulses  Musculoskeletal: 5/5 strength b/l upper and lower extremities  Skin: No rashes  Breast: Deferred  Rectal: Deferred    MEDICATIONS  (STANDING):  clindamycin IVPB 600 milliGRAM(s) IV Intermittent every 8 hours  enoxaparin Injectable 80 milliGRAM(s) SubCutaneous two times a day    Lab Results:  CBC    .		Differential:	[] Automated		[] Manual  Chemistry    04-04    138  |  101  |  39<H>  ----------------------------<  101<H>  4.1   |  30  |  0.92    Ca    8.4<L>      04 Apr 2019 07:20      RADIOLOGY RESULTS:    RADIOLOGY/EKG:    < from: Xray Chest 1 View- PORTABLE-Urgent (03.22.19 @ 13:56) >    Impression:small BILATERAL pleural effusions with underlying atelectasis.   AICD is noted    < end of copied text >    < from: CT Abdomen and Pelvis w/ IV Cont (03.25.19 @ 11:36) >    IMPRESSION: Chest: Moderate bilateral pleural effusions. No evidence of   malignancy.  Abdomen/pelvis: Retroperitoneal soft tissue mass unchanged from prior CT   scan.  Infiltrate on mass in the left kidney. Constellation of findings is   suggestive of lymphoma.    < end of copied text >

## 2019-04-06 NOTE — PROGRESS NOTE ADULT - ASSESSMENT
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   -ECHO - no signs of Endocarditis; with normal cardiac function and no significant valve disease.    -s/p debridement of perineum, scrotum and penile shaft 3/12  -s/p plastic surgery graft placement today 3/27 and with wound vac. will need to lay in bed for 5-7 days , and then we will remove wound vac and re-adress wound.   -nursing to get air mattress to prevent decubitus  -C/w local wound care as per Urology and plastic surgery  -Urology consult appreciated -  remove zhou for trial of void once wound vac is removed  -s/p IV Zosyn, Augmentin and IV Clindamycin   -S/P Wound Vac  -plastics FU appreciated continue with Xeroform to perineal skin graft BID. Keep tagaderm on Donor site.  -Remove Zhou and TOV - PAtient is voiding on his own    *JAMIE on CKD, improved   - pre-renal azotemia vs ATN  - S/p  IV hydration with Improvement of renal Fx  - Encourage oral hydration   - hold ACEI  - Monitor UO   - S/P intermittent lasix IV  - Restart Lasix PO QD     *AFIB with slow ventricular response - New DX?   -Metoprolol held   -[id number] score: 5, high risk   -CArdio eval appreciated, no recommendation for A/c, will need to follow up   -EP consult S/p MICRA placement    *Left upper ext DVT  -Sc lovenox full dose 80 q12h    *Soft tissue mass surrounding proximal aorta, suspicious for metastatic lymphadenopathy/Lymphoma incidental finding on CT   -No h/o malignancy as per Pt, but not following with PCP   -CT abd/pelvis with IV contrast  done and confirms mass and also + infiltrative L kidney mass, suspicious for Lymphoma  -hemeon consult appreciated - Discussed wiht Dr Marin Due to his noncompliance and poor fu it was decided that patient will stay for biopsy; D/C was cancelled   -CT guided biopsy on Monday by IR. Discussed with IR - Dr Jhaveri Hold Lovenox X 24 H; last dose of lovenox will be on sat evening and Heparin Drip to be started on 4/7 at 6am     *HTN  -BP improved, low normal now   -Cont Hold meds:  Amlodipine 10mg po daily, Lisinopril 40mg po daily, Metoprolol XL 100mg po daily  -restart lasix PO QD    *Severe Protein calorie Malnutrition. Hypoalbuminemia    -encourage oral intake, supplements     *Generalized Edema - resolving   -likely due to IV hydration and hypoalbuminemia   -Doppler neg for LE DVT  -Elevate extremities  -Edema improved   -S/P IV lasix continue Lasix PO QD    *mild allergic reaction to the tegaderm on skin where the graft wound is.   -improving   -some mild skin allergy on right arm > hydrocortisone     Plan: patient s/p resection of scrotum and shaft of penis, and then subsequently went under another surgery for graft placement by plastics.  wound vac on graft site and on the scrotum, now removed. CT guided biopsy on Monday by IR. Hold Lovenox X 24 H prior to procedure; last dose of lovenox will be on sat evening and Heparin Drip to be started on 4/7 at 6am

## 2019-04-07 LAB
ANION GAP SERPL CALC-SCNC: 7 MMOL/L — SIGNIFICANT CHANGE UP (ref 5–17)
APTT BLD: 34.8 SEC — SIGNIFICANT CHANGE UP (ref 27.5–36.3)
APTT BLD: 73.4 SEC — HIGH (ref 27.5–36.3)
APTT BLD: 92.7 SEC — HIGH (ref 27.5–36.3)
BUN SERPL-MCNC: 42 MG/DL — HIGH (ref 7–23)
CALCIUM SERPL-MCNC: 8.6 MG/DL — SIGNIFICANT CHANGE UP (ref 8.5–10.1)
CHLORIDE SERPL-SCNC: 105 MMOL/L — SIGNIFICANT CHANGE UP (ref 96–108)
CO2 SERPL-SCNC: 29 MMOL/L — SIGNIFICANT CHANGE UP (ref 22–31)
CREAT SERPL-MCNC: 0.8 MG/DL — SIGNIFICANT CHANGE UP (ref 0.5–1.3)
GLUCOSE SERPL-MCNC: 95 MG/DL — SIGNIFICANT CHANGE UP (ref 70–99)
HCT VFR BLD CALC: 27.5 % — LOW (ref 39–50)
HCT VFR BLD CALC: 29.8 % — LOW (ref 39–50)
HGB BLD-MCNC: 8.8 G/DL — LOW (ref 13–17)
HGB BLD-MCNC: 9.4 G/DL — LOW (ref 13–17)
INR BLD: 1.09 RATIO — SIGNIFICANT CHANGE UP (ref 0.88–1.16)
MAGNESIUM SERPL-MCNC: 2 MG/DL — SIGNIFICANT CHANGE UP (ref 1.6–2.6)
MCHC RBC-ENTMCNC: 31.5 GM/DL — LOW (ref 32–36)
MCHC RBC-ENTMCNC: 32 GM/DL — SIGNIFICANT CHANGE UP (ref 32–36)
MCHC RBC-ENTMCNC: 32.4 PG — SIGNIFICANT CHANGE UP (ref 27–34)
MCHC RBC-ENTMCNC: 32.6 PG — SIGNIFICANT CHANGE UP (ref 27–34)
MCV RBC AUTO: 101.9 FL — HIGH (ref 80–100)
MCV RBC AUTO: 102.8 FL — HIGH (ref 80–100)
NRBC # BLD: 0 /100 WBCS — SIGNIFICANT CHANGE UP (ref 0–0)
NRBC # BLD: 0 /100 WBCS — SIGNIFICANT CHANGE UP (ref 0–0)
PHOSPHATE SERPL-MCNC: 2.6 MG/DL — SIGNIFICANT CHANGE UP (ref 2.5–4.5)
PLATELET # BLD AUTO: 352 K/UL — SIGNIFICANT CHANGE UP (ref 150–400)
PLATELET # BLD AUTO: 375 K/UL — SIGNIFICANT CHANGE UP (ref 150–400)
POTASSIUM SERPL-MCNC: 4.3 MMOL/L — SIGNIFICANT CHANGE UP (ref 3.5–5.3)
POTASSIUM SERPL-SCNC: 4.3 MMOL/L — SIGNIFICANT CHANGE UP (ref 3.5–5.3)
PROTHROM AB SERPL-ACNC: 12.1 SEC — SIGNIFICANT CHANGE UP (ref 10–12.9)
RBC # BLD: 2.7 M/UL — LOW (ref 4.2–5.8)
RBC # BLD: 2.9 M/UL — LOW (ref 4.2–5.8)
RBC # FLD: 16.1 % — HIGH (ref 10.3–14.5)
RBC # FLD: 16.1 % — HIGH (ref 10.3–14.5)
SODIUM SERPL-SCNC: 141 MMOL/L — SIGNIFICANT CHANGE UP (ref 135–145)
WBC # BLD: 8.24 K/UL — SIGNIFICANT CHANGE UP (ref 3.8–10.5)
WBC # BLD: 9.05 K/UL — SIGNIFICANT CHANGE UP (ref 3.8–10.5)
WBC # FLD AUTO: 8.24 K/UL — SIGNIFICANT CHANGE UP (ref 3.8–10.5)
WBC # FLD AUTO: 9.05 K/UL — SIGNIFICANT CHANGE UP (ref 3.8–10.5)

## 2019-04-07 RX ADMIN — Medication 20 MILLIGRAM(S): at 11:16

## 2019-04-07 RX ADMIN — HEPARIN SODIUM 1500 UNIT(S)/HR: 5000 INJECTION INTRAVENOUS; SUBCUTANEOUS at 13:10

## 2019-04-07 RX ADMIN — HEPARIN SODIUM 1500 UNIT(S)/HR: 5000 INJECTION INTRAVENOUS; SUBCUTANEOUS at 06:15

## 2019-04-07 RX ADMIN — HEPARIN SODIUM 1500 UNIT(S)/HR: 5000 INJECTION INTRAVENOUS; SUBCUTANEOUS at 20:17

## 2019-04-07 NOTE — PROGRESS NOTE ADULT - ASSESSMENT
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   -ECHO - no signs of Endocarditis; with normal cardiac function and no significant valve disease.    -s/p debridement of perineum, scrotum and penile shaft 3/12  -s/p plastic surgery graft placement today 3/27 and with wound vac. will need to lay in bed for 5-7 days , and then we will remove wound vac and re-adress wound.   -nursing to get air mattress to prevent decubitus  -C/w local wound care as per Urology and plastic surgery  -Urology consult appreciated -  remove zhou for trial of void once wound vac is removed  -s/p IV Zosyn, Augmentin and IV Clindamycin   -S/P Wound Vac  -plastics FU appreciated continue with Xeroform to perineal skin graft BID. Keep tagaderm on Donor site.  -Remove Zhou and TOV - PAtient is voiding on his own    *JAMIE on CKD, improved   - pre-renal azotemia vs ATN  - S/p  IV hydration with Improvement of renal Fx  - Encourage oral hydration   - hold ACEI  - Monitor UO   - S/P intermittent lasix IV  - Restart Lasix PO QD     *AFIB with slow ventricular response - New DX?   -Metoprolol held   -[id number] score: 5, high risk   -CArdio eval appreciated, no recommendation for A/c, will need to follow up   -EP consult S/p MICRA placement    *Left upper ext DVT  -Sc lovenox full dose 80 q12h    *Soft tissue mass surrounding proximal aorta, suspicious for metastatic lymphadenopathy/Lymphoma incidental finding on CT   -No h/o malignancy as per Pt, but not following with PCP   -CT abd/pelvis with IV contrast  done and confirms mass and also + infiltrative L kidney mass, suspicious for Lymphoma  -hemeon consult appreciated - Discussed wiht Dr Marin Due to his noncompliance and poor fu it was decided that patient will stay for biopsy; D/C was cancelled   -CT guided biopsy on Monday by IR. Discussed with IR - Dr Jhaveri Hold Lovenox X 24 H; last dose of lovenox will be on sat evening 4/6  -Heparin Drip (4/7)    *HTN  -BP improved, low normal now   -Cont Hold meds:  Amlodipine 10mg po daily, Lisinopril 40mg po daily, Metoprolol XL 100mg po daily  -restart lasix PO QD    *Severe Protein calorie Malnutrition. Hypoalbuminemia    -encourage oral intake, supplements     *Generalized Edema - resolving   -likely due to IV hydration and hypoalbuminemia   -Doppler neg for LE DVT  -Elevate extremities  -Edema improved   -S/P IV lasix continue Lasix PO QD    *mild allergic reaction to the tegaderm on skin where the graft wound is.   -improving   -some mild skin allergy on right arm > hydrocortisone     Plan: patient s/p resection of scrotum and shaft of penis, and then subsequently went under another surgery for graft placement by plastics.  wound vac on graft site and on the scrotum, now removed. CT guided biopsy on Monday by IR. Heparin Drip to be started on 4/7

## 2019-04-07 NOTE — PROGRESS NOTE ADULT - SUBJECTIVE AND OBJECTIVE BOX
HOSPITALIST PROGRESS NOTE:  SUBJECTIVE:  PCP: PCP; Dr. Sean Rodriguez  Neurosurgery; Dr. Billy Ortiz    Chief Complaint: Patient is a 89y old  Male who presents with a chief complaint of Recurrent Falls (01 Apr 2019 17:49)    HPI:  88 y/o M PMHx significant for hypertension, hyperlipidemia, meningioma, recurrent mechanical falls, and cervical myelopathy/radiculopathy presents to the ED for further evaluation of an unwitnessed fall last night. 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  patient s/p resection of scrotum and shaft of penis, and then subsequently went under another surgery for graft placement by plastics.  wound vac on graft site and on the scrotum. needs to be there for 7 days. wound vac to be removed on wednesday. Patient has been getting a lot of fluids since admission and found to be edematous with water blisters and was given IV lasix intermittently;     4/2:  Patient has no complaints. denies any pain  4/3: No overnight events, since patient had staining ot he vac with stioool the vack was taken down; Seen by plastics and vac was removed;  less swelling S/P IV lasix yesterday; reiterated the mass around aorta and the possibility of malignancy/Lymphoma;  will need close FU as outpatient   4/4:  Patient has no complaints. less swollen today.  4/5: NO overnight events; Patient has no complaints. patient upset with me about the possibility of cancer; unable to reach the family; Due to his noncompliance and poor fu it was decided that patient will stay for biopsy  4/6:  Patient has no complaints.  agreeing for Biopsy; No other issues   4/7:  heparin Drip started; No other complaints     Allergies:  No Known Allergies    REVIEW OF SYSTEMS:  See HPI. All other review of systems is negative unless indicated above.     OBJECTIVE  Physical Exam:  Vital Signs Last 24 Hrs  T(C): 36.4 (07 Apr 2019 10:52), Max: 37.1 (07 Apr 2019 05:26)  T(F): 97.5 (07 Apr 2019 10:52), Max: 98.7 (07 Apr 2019 05:26)  HR: 60 (07 Apr 2019 10:52) (60 - 60)  BP: 102/58 (07 Apr 2019 10:52) (102/58 - 114/59)  BP(mean): --  RR: 16 (07 Apr 2019 10:52) (16 - 18)  SpO2: 98% (07 Apr 2019 10:52) (96% - 98%)    Constitutional: NAD, awake and alert, well-developed  Neurological: AAO x 3, no focal deficits  HEENT: PERRLA, EOMI, MMM  Neck: Soft and supple, No LAD, No JVD  Respiratory: Breath sounds are clear bilaterally, No wheezing, rales or rhonchi  Cardiovascular: S1 and S2, regular rate and rhythm; no Murmurs, gallops or rubs  Gastrointestinal: Bowel Sounds present, soft, nontender, nondistended, no guarding, no rebound tenderness  Back: No CVA tenderness   : dressing applied to genitalia  Extremities: No peripheral edema  Vascular: 2+ peripheral pulses  Musculoskeletal: 5/5 strength b/l upper and lower extremities  Skin: No rashes  Breast: Deferred  Rectal: Deferred    MEDICATIONS  (STANDING):  clindamycin IVPB 600 milliGRAM(s) IV Intermittent every 8 hours  enoxaparin Injectable 80 milliGRAM(s) SubCutaneous two times a day    Lab Results:  CBC    .		Differential:	[] Automated		[] Manual  Chemistry    04-04    138  |  101  |  39<H>  ----------------------------<  101<H>  4.1   |  30  |  0.92    Ca    8.4<L>      04 Apr 2019 07:20      RADIOLOGY RESULTS:    RADIOLOGY/EKG:    < from: Xray Chest 1 View- PORTABLE-Urgent (03.22.19 @ 13:56) >    Impression:small BILATERAL pleural effusions with underlying atelectasis.   AICD is noted    < end of copied text >    < from: CT Abdomen and Pelvis w/ IV Cont (03.25.19 @ 11:36) >    IMPRESSION: Chest: Moderate bilateral pleural effusions. No evidence of   malignancy.  Abdomen/pelvis: Retroperitoneal soft tissue mass unchanged from prior CT   scan.  Infiltrate on mass in the left kidney. Constellation of findings is   suggestive of lymphoma.    < end of copied text >

## 2019-04-08 ENCOUNTER — RESULT REVIEW (OUTPATIENT)
Age: 84
End: 2019-04-08

## 2019-04-08 LAB
APTT BLD: 41.4 SEC — HIGH (ref 27.5–36.3)
HCT VFR BLD CALC: 28.6 % — LOW (ref 39–50)
HGB BLD-MCNC: 8.9 G/DL — LOW (ref 13–17)
MCHC RBC-ENTMCNC: 31.1 GM/DL — LOW (ref 32–36)
MCHC RBC-ENTMCNC: 32.5 PG — SIGNIFICANT CHANGE UP (ref 27–34)
MCV RBC AUTO: 104.4 FL — HIGH (ref 80–100)
NRBC # BLD: 0 /100 WBCS — SIGNIFICANT CHANGE UP (ref 0–0)
PLATELET # BLD AUTO: 369 K/UL — SIGNIFICANT CHANGE UP (ref 150–400)
RBC # BLD: 2.74 M/UL — LOW (ref 4.2–5.8)
RBC # FLD: 16.4 % — HIGH (ref 10.3–14.5)
WBC # BLD: 9.88 K/UL — SIGNIFICANT CHANGE UP (ref 3.8–10.5)
WBC # FLD AUTO: 9.88 K/UL — SIGNIFICANT CHANGE UP (ref 3.8–10.5)

## 2019-04-08 PROCEDURE — 88173 CYTOPATH EVAL FNA REPORT: CPT | Mod: 26

## 2019-04-08 PROCEDURE — 88172 CYTP DX EVAL FNA 1ST EA SITE: CPT | Mod: 26

## 2019-04-08 PROCEDURE — 77012 CT SCAN FOR NEEDLE BIOPSY: CPT | Mod: 26

## 2019-04-08 PROCEDURE — 49180 BIOPSY ABDOMINAL MASS: CPT

## 2019-04-08 PROCEDURE — 88305 TISSUE EXAM BY PATHOLOGIST: CPT | Mod: 26

## 2019-04-08 RX ORDER — ACETAMINOPHEN 500 MG
1000 TABLET ORAL ONCE
Qty: 0 | Refills: 0 | Status: DISCONTINUED | OUTPATIENT
Start: 2019-04-08 | End: 2019-04-08

## 2019-04-08 RX ORDER — ONDANSETRON 8 MG/1
4 TABLET, FILM COATED ORAL ONCE
Qty: 0 | Refills: 0 | Status: DISCONTINUED | OUTPATIENT
Start: 2019-04-08 | End: 2019-04-08

## 2019-04-08 RX ORDER — OXYCODONE HYDROCHLORIDE 5 MG/1
10 TABLET ORAL ONCE
Qty: 0 | Refills: 0 | Status: DISCONTINUED | OUTPATIENT
Start: 2019-04-08 | End: 2019-04-08

## 2019-04-08 RX ORDER — HEPARIN SODIUM 5000 [USP'U]/ML
3000 INJECTION INTRAVENOUS; SUBCUTANEOUS EVERY 6 HOURS
Qty: 0 | Refills: 0 | Status: DISCONTINUED | OUTPATIENT
Start: 2019-04-08 | End: 2019-04-09

## 2019-04-08 RX ORDER — HEPARIN SODIUM 5000 [USP'U]/ML
6500 INJECTION INTRAVENOUS; SUBCUTANEOUS EVERY 6 HOURS
Qty: 0 | Refills: 0 | Status: DISCONTINUED | OUTPATIENT
Start: 2019-04-08 | End: 2019-04-09

## 2019-04-08 RX ORDER — HEPARIN SODIUM 5000 [USP'U]/ML
6500 INJECTION INTRAVENOUS; SUBCUTANEOUS ONCE
Qty: 0 | Refills: 0 | Status: DISCONTINUED | OUTPATIENT
Start: 2019-04-08 | End: 2019-04-08

## 2019-04-08 RX ORDER — HEPARIN SODIUM 5000 [USP'U]/ML
INJECTION INTRAVENOUS; SUBCUTANEOUS
Qty: 25000 | Refills: 0 | Status: DISCONTINUED | OUTPATIENT
Start: 2019-04-08 | End: 2019-04-09

## 2019-04-08 RX ORDER — SODIUM CHLORIDE 9 MG/ML
1000 INJECTION INTRAMUSCULAR; INTRAVENOUS; SUBCUTANEOUS
Qty: 0 | Refills: 0 | Status: DISCONTINUED | OUTPATIENT
Start: 2019-04-08 | End: 2019-04-08

## 2019-04-08 RX ORDER — FENTANYL CITRATE 50 UG/ML
50 INJECTION INTRAVENOUS
Qty: 0 | Refills: 0 | Status: DISCONTINUED | OUTPATIENT
Start: 2019-04-08 | End: 2019-04-08

## 2019-04-08 RX ADMIN — Medication 20 MILLIGRAM(S): at 18:44

## 2019-04-08 RX ADMIN — HEPARIN SODIUM 1500 UNIT(S)/HR: 5000 INJECTION INTRAVENOUS; SUBCUTANEOUS at 20:15

## 2019-04-08 NOTE — BRIEF OPERATIVE NOTE - OPERATION/FINDINGS
1. Access of RP mass under CT with 17G guide needle  2. 18G core x 5  3) No hematoma on final imaging
As per surgeon dictation
extensive necrotic tissue of perineum, scrotum, and penile shaft

## 2019-04-08 NOTE — PROVIDER CONTACT NOTE (OTHER) - ACTION/TREATMENT ORDERED:
Ok to apply xeroform dsg with gauze and tape twice a day. no wound vac needed [FreeTextEntry1] : This is a 69-year-old female with history of hypertension referred by Dr. Tiffany Ponce for consultation for colon cancer screening. She reports undergoing colonoscopy in 9-10 years ago reported with colon polyps. I asked her copy of the results faxed to me. She denies abdominal pain, nausea or vomiting. She denies changes in her bowel habits. She denies rectal bleeding or melena. She denies weight loss or anemia. There is no family history of colon cancer.

## 2019-04-08 NOTE — BRIEF OPERATIVE NOTE - NSICDXBRIEFPROCEDURE_GEN_ALL_CORE_FT
PROCEDURES:  Biopsy, retroperitoneal mass, with CT guidance 08-Apr-2019 16:47:26  Billy Hollins
PROCEDURES:  Graft, skin, split-thickness, perineum or groin 27-Mar-2019 11:03:24  Sindi Thompson  Wound debridement 12-Mar-2019 21:46:32  Tootie Chamorro
PROCEDURES:  Wound debridement 12-Mar-2019 21:46:32  Tootie Chamorro  Scrotal exploration 12-Mar-2019 21:46:18  Tootie Chamorro

## 2019-04-08 NOTE — CHART NOTE - NSCHARTNOTEFT_GEN_A_CORE
Called by RN, pt s/p biopsy and as discussed with Dr. Hollins, Heparin to be restarted ( see provider to rn order).      A/P- 89 yo M 88 y/o M PMHx significant for hypertension, hyperlipidemia, meningioma, recurrent mechanical falls, and cervical myelopathy/radiculopathy admitted S/p Fall    #s/p retroperitoneal mass biopsy  operative findings- no hematoma on final imaging   -Heparin gtt now , as per Dr. Hollins order  -Monitor for signs and symptoms of bleeding

## 2019-04-08 NOTE — PROGRESS NOTE ADULT - ASSESSMENT
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   -ECHO - no signs of Endocarditis; with normal cardiac function and no significant valve disease.    -s/p debridement of perineum, scrotum and penile shaft 3/12  -s/p plastic surgery graft placement today 3/27 and with wound vac. will need to lay in bed for 5-7 days , and then we will remove wound vac and re-adress wound.   -nursing to get air mattress to prevent decubitus  -C/w local wound care as per Urology and plastic surgery  -Urology consult appreciated -  remove zhou for trial of void once wound vac is removed  -s/p IV Zosyn, Augmentin and IV Clindamycin   -S/P Wound Vac  -plastics FU appreciated continue with Xeroform to perineal skin graft BID. Keep tagaderm on Donor site.  -Remove Zhou and TOV - PAtient is voiding on his own    *JAMIE on CKD, improved   - pre-renal azotemia vs ATN  - S/p  IV hydration with Improvement of renal Fx  - Encourage oral hydration   - hold ACEI  - Monitor UO   - S/P intermittent lasix IV  - Restart Lasix PO QD     *AFIB with slow ventricular response - New DX?   -Metoprolol held   -[id number] score: 5, high risk   -CArdio eval appreciated, no recommendation for A/c, will need to follow up   -EP consult S/p MICRA placement    *Left upper ext DVT  -Sc lovenox full dose 80 q12h - Hold currently on Heparin Drip    *Soft tissue mass surrounding proximal aorta, suspicious for metastatic lymphadenopathy/Lymphoma incidental finding on CT   -No h/o malignancy as per Pt, but not following with PCP   -CT abd/pelvis with IV contrast  done and confirms mass and also + infiltrative L kidney mass, suspicious for Lymphoma  -[facility] consult appreciated - Discussed wiht Dr Marin Due to his noncompliance and poor fu it was decided that patient will stay for biopsy; D/C was cancelled   -CT guided biopsy on today by IR. Discussed with IR -Heparin Drip (4/7)  -FU with IR when to resume Lovenox S/P biopsy    *HTN  -BP improved, low normal now   -Cont Hold meds:  Amlodipine 10mg po daily, Lisinopril 40mg po daily, Metoprolol XL 100mg po daily  -restart lasix PO QD    *Severe Protein calorie Malnutrition. Hypoalbuminemia    -encourage oral intake, supplements     *Generalized Edema - resolving   -likely due to IV hydration and hypoalbuminemia   -Doppler neg for LE DVT  -Elevate extremities  -Edema improved   -S/P IV lasix continue Lasix PO QD    *mild allergic reaction to the tegaderm on skin where the graft wound is.   -improving   -some mild skin allergy on right arm > hydrocortisone     Plan: patient s/p resection of scrotum and shaft of penis, and then subsequently went under another surgery for graft placement by plastics.  wound vac on graft site and on the scrotum, now wound vac removed. CT guided biopsy today by IR and on Heparin Drip which will need to be switched over to Lovenox; FU with IR when to initiate Lovenox most likely 48H; patient needs to establish a PCP;  Spoke with Dr Rosen and he no longer is at [facility].  He advised me that patient should follow up with Dr Lomas/[facility] group as he will not travel to [facility]. Dr Lomas called and states that he doesnt know the patient and he will not be following; Please FU with [facility] group as to who will Follow this patient since this is their patient; Patient will need close FU as outpatient and someone needs to FU the Biopsy results; plan is to send patient to rehab first

## 2019-04-08 NOTE — PROGRESS NOTE ADULT - PROVIDER SPECIALTY LIST ADULT
Electrophysiology
Heme/Onc
Hospitalist
Infectious Disease
Plastic Surgery
Plastic Surgery
Surgery
Surgery
Urology
Hospitalist
Urology
Hospitalist

## 2019-04-08 NOTE — PROGRESS NOTE ADULT - SUBJECTIVE AND OBJECTIVE BOX
HOSPITALIST PROGRESS NOTE:  SUBJECTIVE:  PCP: PCP; Dr. Sean Rodriguez  Neurosurgery; Dr. Billy Ortiz    Chief Complaint: Patient is a 89y old  Male who presents with a chief complaint of Recurrent Falls (01 Apr 2019 17:49)    HPI:  89 y/o M PMHx significant for hypertension, hyperlipidemia, meningioma, recurrent mechanical falls, and cervical myelopathy/radiculopathy presents to the ED for further evaluation of an unwitnessed fall last night. 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  patient s/p resection of scrotum and shaft of penis, and then subsequently went under another surgery for graft placement by plastics.  wound vac on graft site and on the scrotum. needs to be there for 7 days. wound vac to be removed on wednesday. Patient has been getting a lot of fluids since admission and found to be edematous with water blisters and was given IV lasix intermittently;     4/2:  Patient has no complaints. denies any pain  4/3: No overnight events, since patient had staining ot he vac with stioool the vack was taken down; Seen by plastics and vac was removed;  less swelling S/P IV lasix yesterday; reiterated the mass around aorta and the possibility of malignancy/Lymphoma;  will need close FU as outpatient   4/4:  Patient has no complaints. less swollen today.  4/5: NO overnight events; Patient has no complaints. patient upset with me about the possibility of cancer; unable to reach the family; Due to his noncompliance and poor fu it was decided that patient will stay for biopsy  4/6:  Patient has no complaints.  agreeing for Biopsy; No other issues   4/7:  heparin Drip started; No other complaints   4/8: Discussed with IR and Heparin Drip on hold; patient to go for biopsy this afternoon; No other complaints     Allergies:  No Known Allergies    REVIEW OF SYSTEMS:  See HPI. All other review of systems is negative unless indicated above.     OBJECTIVE  Physical Exam:  Vital Signs Last 24 Hrs  T(C): 36.6 (08 Apr 2019 10:40), Max: 36.7 (07 Apr 2019 21:55)  T(F): 97.9 (08 Apr 2019 10:40), Max: 98.1 (07 Apr 2019 21:55)  HR: 60 (08 Apr 2019 10:40) (60 - 61)  BP: 107/59 (08 Apr 2019 10:40) (102/68 - 107/59)  BP(mean): --  RR: 17 (08 Apr 2019 10:40) (16 - 18)  SpO2: 97% (08 Apr 2019 10:40) (96% - 98%)    Constitutional: NAD, awake and alert, well-developed  Neurological: AAO x 3, no focal deficits  HEENT: PERRLA, EOMI, MMM  Neck: Soft and supple, No LAD, No JVD  Respiratory: Breath sounds are clear bilaterally, No wheezing, rales or rhonchi  Cardiovascular: S1 and S2, regular rate and rhythm; no Murmurs, gallops or rubs  Gastrointestinal: Bowel Sounds present, soft, nontender, nondistended, no guarding, no rebound tenderness  Back: No CVA tenderness   : dressing applied to genitalia  Extremities: No peripheral edema  Vascular: 2+ peripheral pulses  Musculoskeletal: 5/5 strength b/l upper and lower extremities  Skin: No rashes  Breast: Deferred  Rectal: Deferred    MEDICATIONS  (STANDING):  clindamycin IVPB 600 milliGRAM(s) IV Intermittent every 8 hours  enoxaparin Injectable 80 milliGRAM(s) SubCutaneous two times a day    Lab Results:  CBC    .		Differential:	[] Automated		[] Manual  Chemistry    04-04    138  |  101  |  39<H>  ----------------------------<  101<H>  4.1   |  30  |  0.92    Ca    8.4<L>      04 Apr 2019 07:20      RADIOLOGY RESULTS:    RADIOLOGY/EKG:    < from: Xray Chest 1 View- PORTABLE-Urgent (03.22.19 @ 13:56) >    Impression:small BILATERAL pleural effusions with underlying atelectasis.   AICD is noted    < end of copied text >    < from: CT Abdomen and Pelvis w/ IV Cont (03.25.19 @ 11:36) >    IMPRESSION: Chest: Moderate bilateral pleural effusions. No evidence of   malignancy.  Abdomen/pelvis: Retroperitoneal soft tissue mass unchanged from prior CT   scan.  Infiltrate on mass in the left kidney. Constellation of findings is   suggestive of lymphoma.    < end of copied text >

## 2019-04-08 NOTE — PROGRESS NOTE ADULT - REASON FOR ADMISSION
Recurrent Falls

## 2019-04-08 NOTE — PROVIDER CONTACT NOTE (OTHER) - REASON
Blisters around tegaderm on dressing. Clarification on dressing removal date and by who
CARDIAC E.P
Cardio Consult
Consult
Dr. Winn
Hem-Onc Consult
Lung mass, emphysema
Notify PCP
Returning message about heparin
S/P Scrotal Exploration and Debridement
Wound vac dressing soiled
consult
wound vac
wound vac dressing soiled
Consult ID

## 2019-04-08 NOTE — BRIEF OPERATIVE NOTE - NSICDXBRIEFPREOP_GEN_ALL_CORE_FT
PRE-OP DIAGNOSIS:  Retroperitoneal mass 08-Apr-2019 16:47:43  Billy Hollins
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

## 2019-04-08 NOTE — PROVIDER CONTACT NOTE (OTHER) - SITUATION
Consult made spoke with Desirae from answering service
please look at patients blisters in groin
Called Dr. office. MD aware patient was admitted.
DR SERVICE AWARE
MD called by Day RN for heparin orders, returning phone call. MD Ramirez confirmed heparin orders. Heparin to restart at original rate of 15mL/hr. Orders place by MD Case.
Patient's scrotum edematous with black wound draining.
Pt had a large BM this morning and soiled wound vac dressing. Dressing compromised, needs to be replaced.
Service aware of consult
service aware
wound vac dressing soiled

## 2019-04-08 NOTE — PROVIDER CONTACT NOTE (OTHER) - DATE AND TIME:
02-Apr-2019 17:00
02-Apr-2019 06:04
02-Apr-2019 06:25
02-Apr-2019 14:00
08-Apr-2019 19:45
11-Mar-2019 16:08
12-Mar-2019 01:47
14-Mar-2019 00:35
15-Mar-2019 08:55
18-Mar-2019 13:04
20-Mar-2019 22:35
20-Mar-2019 22:36
31-Mar-2019 10:59
31-Mar-2019 13:58
12-Mar-2019 16:20

## 2019-04-09 VITALS
OXYGEN SATURATION: 100 % | RESPIRATION RATE: 18 BRPM | DIASTOLIC BLOOD PRESSURE: 57 MMHG | SYSTOLIC BLOOD PRESSURE: 108 MMHG | HEART RATE: 60 BPM | TEMPERATURE: 98 F

## 2019-04-09 LAB
APTT BLD: 67.9 SEC — HIGH (ref 27.5–36.3)
APTT BLD: 87.5 SEC — HIGH (ref 27.5–36.3)
HCT VFR BLD CALC: 28.5 % — LOW (ref 39–50)
HCT VFR BLD CALC: 29.4 % — LOW (ref 39–50)
HGB BLD-MCNC: 8.8 G/DL — LOW (ref 13–17)
HGB BLD-MCNC: 9.2 G/DL — LOW (ref 13–17)
MCHC RBC-ENTMCNC: 30.9 GM/DL — LOW (ref 32–36)
MCHC RBC-ENTMCNC: 31.3 GM/DL — LOW (ref 32–36)
MCHC RBC-ENTMCNC: 32.1 PG — SIGNIFICANT CHANGE UP (ref 27–34)
MCHC RBC-ENTMCNC: 32.3 PG — SIGNIFICANT CHANGE UP (ref 27–34)
MCV RBC AUTO: 103.2 FL — HIGH (ref 80–100)
MCV RBC AUTO: 104 FL — HIGH (ref 80–100)
NRBC # BLD: 0 /100 WBCS — SIGNIFICANT CHANGE UP (ref 0–0)
NRBC # BLD: 0 /100 WBCS — SIGNIFICANT CHANGE UP (ref 0–0)
PLATELET # BLD AUTO: 313 K/UL — SIGNIFICANT CHANGE UP (ref 150–400)
PLATELET # BLD AUTO: 346 K/UL — SIGNIFICANT CHANGE UP (ref 150–400)
RBC # BLD: 2.74 M/UL — LOW (ref 4.2–5.8)
RBC # BLD: 2.85 M/UL — LOW (ref 4.2–5.8)
RBC # FLD: 16.3 % — HIGH (ref 10.3–14.5)
RBC # FLD: 16.3 % — HIGH (ref 10.3–14.5)
WBC # BLD: 8.17 K/UL — SIGNIFICANT CHANGE UP (ref 3.8–10.5)
WBC # BLD: 9.29 K/UL — SIGNIFICANT CHANGE UP (ref 3.8–10.5)
WBC # FLD AUTO: 8.17 K/UL — SIGNIFICANT CHANGE UP (ref 3.8–10.5)
WBC # FLD AUTO: 9.29 K/UL — SIGNIFICANT CHANGE UP (ref 3.8–10.5)

## 2019-04-09 RX ORDER — APIXABAN 2.5 MG/1
1 TABLET, FILM COATED ORAL
Qty: 0 | Refills: 0 | COMMUNITY
Start: 2019-04-09

## 2019-04-09 RX ORDER — APIXABAN 2.5 MG/1
5 TABLET, FILM COATED ORAL EVERY 12 HOURS
Qty: 0 | Refills: 0 | Status: DISCONTINUED | OUTPATIENT
Start: 2019-04-09 | End: 2019-04-09

## 2019-04-09 RX ADMIN — HEPARIN SODIUM 1500 UNIT(S)/HR: 5000 INJECTION INTRAVENOUS; SUBCUTANEOUS at 02:33

## 2019-04-09 RX ADMIN — Medication 20 MILLIGRAM(S): at 11:32

## 2019-04-09 RX ADMIN — HEPARIN SODIUM 1500 UNIT(S)/HR: 5000 INJECTION INTRAVENOUS; SUBCUTANEOUS at 08:53

## 2019-04-09 RX ADMIN — APIXABAN 5 MILLIGRAM(S): 2.5 TABLET, FILM COATED ORAL at 14:47

## 2019-04-19 LAB — SURGICAL PATHOLOGY STUDY: SIGNIFICANT CHANGE UP

## 2019-04-22 DIAGNOSIS — I44.4 LEFT ANTERIOR FASCICULAR BLOCK: ICD-10-CM
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
## 2019-04-22 DIAGNOSIS — A41.51 SEPSIS DUE TO ESCHERICHIA COLI [E. COLI]: ICD-10-CM

## 2019-04-22 DIAGNOSIS — E88.09 OTHER DISORDERS OF PLASMA-PROTEIN METABOLISM, NOT ELSEWHERE CLASSIFIED: ICD-10-CM

## 2019-04-22 DIAGNOSIS — E86.0 DEHYDRATION: ICD-10-CM

## 2019-04-22 DIAGNOSIS — E78.00 PURE HYPERCHOLESTEROLEMIA, UNSPECIFIED: ICD-10-CM

## 2019-04-22 DIAGNOSIS — I82.622 ACUTE EMBOLISM AND THROMBOSIS OF DEEP VEINS OF LEFT UPPER EXTREMITY: ICD-10-CM

## 2019-04-22 DIAGNOSIS — E43 UNSPECIFIED SEVERE PROTEIN-CALORIE MALNUTRITION: ICD-10-CM

## 2019-04-22 DIAGNOSIS — N47.1 PHIMOSIS: ICD-10-CM

## 2019-04-22 DIAGNOSIS — I49.5 SICK SINUS SYNDROME: ICD-10-CM

## 2019-04-22 DIAGNOSIS — I48.91 UNSPECIFIED ATRIAL FIBRILLATION: ICD-10-CM

## 2019-04-22 DIAGNOSIS — S40.811A ABRASION OF RIGHT UPPER ARM, INITIAL ENCOUNTER: ICD-10-CM

## 2019-04-22 DIAGNOSIS — Z87.891 PERSONAL HISTORY OF NICOTINE DEPENDENCE: ICD-10-CM

## 2019-04-22 DIAGNOSIS — A41.59 OTHER GRAM-NEGATIVE SEPSIS: ICD-10-CM

## 2019-04-22 DIAGNOSIS — M47.22 OTHER SPONDYLOSIS WITH RADICULOPATHY, CERVICAL REGION: ICD-10-CM

## 2019-04-22 DIAGNOSIS — A48.0 GAS GANGRENE: ICD-10-CM

## 2019-04-22 DIAGNOSIS — M96.821: ICD-10-CM

## 2019-04-22 DIAGNOSIS — C85.12 UNSPECIFIED B-CELL LYMPHOMA, INTRATHORACIC LYMPH NODES: ICD-10-CM

## 2019-04-22 DIAGNOSIS — K40.90 UNILATERAL INGUINAL HERNIA, WITHOUT OBSTRUCTION OR GANGRENE, NOT SPECIFIED AS RECURRENT: ICD-10-CM

## 2019-04-22 DIAGNOSIS — M72.6 NECROTIZING FASCIITIS: ICD-10-CM

## 2019-04-22 DIAGNOSIS — N49.2 INFLAMMATORY DISORDERS OF SCROTUM: ICD-10-CM

## 2019-04-22 DIAGNOSIS — N17.0 ACUTE KIDNEY FAILURE WITH TUBULAR NECROSIS: ICD-10-CM

## 2019-04-22 DIAGNOSIS — N39.0 URINARY TRACT INFECTION, SITE NOT SPECIFIED: ICD-10-CM

## 2019-04-22 DIAGNOSIS — D64.9 ANEMIA, UNSPECIFIED: ICD-10-CM

## 2019-04-22 DIAGNOSIS — R53.1 WEAKNESS: ICD-10-CM

## 2019-04-22 DIAGNOSIS — N49.3 FOURNIER GANGRENE: ICD-10-CM

## 2019-04-22 DIAGNOSIS — M47.12 OTHER SPONDYLOSIS WITH MYELOPATHY, CERVICAL REGION: ICD-10-CM

## 2019-04-22 DIAGNOSIS — N18.2 CHRONIC KIDNEY DISEASE, STAGE 2 (MILD): ICD-10-CM

## 2019-04-22 DIAGNOSIS — D32.9 BENIGN NEOPLASM OF MENINGES, UNSPECIFIED: ICD-10-CM
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
## 2019-07-18 NOTE — ED ADULT NURSE NOTE - NS ED PATIENT SAFETY CONCERN
Scheduled procedure with patient ,  at Other: Dr Dorothy Alexandra staff Scheduled Via: Case Creation for [facility]  Case # [id number] date: 08.12.19 Procedure time: 5169 ([facility]) ok Daphney Klinefelter per [facility] [address]: n/aEntered into MD's [place] n/aInsurance confirmed as Armando Newberry  , will be the same at time of procedure: yes per psr @ ck in Pre-Op testing required Yes, Patient informed Nicki Weaver required n/a, as advised by nursing with Dr Femi Brooks Md pre instructions given at office visitEMMI: System generated No

## 2019-07-22 ENCOUNTER — EMERGENCY (EMERGENCY)
Facility: HOSPITAL | Age: 84
LOS: 0 days | Discharge: HOME | End: 2019-07-22
Attending: EMERGENCY MEDICINE | Admitting: EMERGENCY MEDICINE
Payer: MEDICARE

## 2019-07-22 VITALS — HEART RATE: 73 BPM | HEIGHT: 70 IN | WEIGHT: 136.03 LBS | TEMPERATURE: 98 F

## 2019-07-22 DIAGNOSIS — M47.22 OTHER SPONDYLOSIS WITH RADICULOPATHY, CERVICAL REGION: ICD-10-CM

## 2019-07-22 DIAGNOSIS — Z79.01 LONG TERM (CURRENT) USE OF ANTICOAGULANTS: ICD-10-CM

## 2019-07-22 DIAGNOSIS — Z48.00 ENCOUNTER FOR CHANGE OR REMOVAL OF NONSURGICAL WOUND DRESSING: ICD-10-CM

## 2019-07-22 DIAGNOSIS — Z98.49 CATARACT EXTRACTION STATUS, UNSPECIFIED EYE: Chronic | ICD-10-CM

## 2019-07-22 DIAGNOSIS — Z99.3 DEPENDENCE ON WHEELCHAIR: ICD-10-CM

## 2019-07-22 DIAGNOSIS — E78.00 PURE HYPERCHOLESTEROLEMIA, UNSPECIFIED: ICD-10-CM

## 2019-07-22 DIAGNOSIS — D32.9 BENIGN NEOPLASM OF MENINGES, UNSPECIFIED: ICD-10-CM

## 2019-07-22 DIAGNOSIS — L03.115 CELLULITIS OF RIGHT LOWER LIMB: ICD-10-CM

## 2019-07-22 DIAGNOSIS — I10 ESSENTIAL (PRIMARY) HYPERTENSION: ICD-10-CM

## 2019-07-22 LAB
ALBUMIN SERPL ELPH-MCNC: 3.4 G/DL — SIGNIFICANT CHANGE UP (ref 3.3–5)
ALP SERPL-CCNC: 126 U/L — HIGH (ref 40–120)
ALT FLD-CCNC: 15 U/L — SIGNIFICANT CHANGE UP (ref 12–78)
ANION GAP SERPL CALC-SCNC: 7 MMOL/L — SIGNIFICANT CHANGE UP (ref 5–17)
AST SERPL-CCNC: 19 U/L — SIGNIFICANT CHANGE UP (ref 15–37)
BASOPHILS # BLD AUTO: 0.04 K/UL — SIGNIFICANT CHANGE UP (ref 0–0.2)
BASOPHILS NFR BLD AUTO: 0.6 % — SIGNIFICANT CHANGE UP (ref 0–2)
BILIRUB SERPL-MCNC: 0.6 MG/DL — SIGNIFICANT CHANGE UP (ref 0.2–1.2)
BUN SERPL-MCNC: 44 MG/DL — HIGH (ref 7–23)
CALCIUM SERPL-MCNC: 9.9 MG/DL — SIGNIFICANT CHANGE UP (ref 8.5–10.1)
CHLORIDE SERPL-SCNC: 112 MMOL/L — HIGH (ref 96–108)
CO2 SERPL-SCNC: 28 MMOL/L — SIGNIFICANT CHANGE UP (ref 22–31)
CREAT SERPL-MCNC: 1.18 MG/DL — SIGNIFICANT CHANGE UP (ref 0.5–1.3)
EOSINOPHIL # BLD AUTO: 0.04 K/UL — SIGNIFICANT CHANGE UP (ref 0–0.5)
EOSINOPHIL NFR BLD AUTO: 0.6 % — SIGNIFICANT CHANGE UP (ref 0–6)
GLUCOSE SERPL-MCNC: 115 MG/DL — HIGH (ref 70–99)
HCT VFR BLD CALC: 38.8 % — LOW (ref 39–50)
HGB BLD-MCNC: 12.3 G/DL — LOW (ref 13–17)
IMM GRANULOCYTES NFR BLD AUTO: 0.2 % — SIGNIFICANT CHANGE UP (ref 0–1.5)
LYMPHOCYTES # BLD AUTO: 1.5 K/UL — SIGNIFICANT CHANGE UP (ref 1–3.3)
LYMPHOCYTES # BLD AUTO: 22.6 % — SIGNIFICANT CHANGE UP (ref 13–44)
MCHC RBC-ENTMCNC: 31.7 GM/DL — LOW (ref 32–36)
MCHC RBC-ENTMCNC: 31.7 PG — SIGNIFICANT CHANGE UP (ref 27–34)
MCV RBC AUTO: 100 FL — SIGNIFICANT CHANGE UP (ref 80–100)
MONOCYTES # BLD AUTO: 0.48 K/UL — SIGNIFICANT CHANGE UP (ref 0–0.9)
MONOCYTES NFR BLD AUTO: 7.2 % — SIGNIFICANT CHANGE UP (ref 2–14)
NEUTROPHILS # BLD AUTO: 4.57 K/UL — SIGNIFICANT CHANGE UP (ref 1.8–7.4)
NEUTROPHILS NFR BLD AUTO: 68.8 % — SIGNIFICANT CHANGE UP (ref 43–77)
PLATELET # BLD AUTO: 255 K/UL — SIGNIFICANT CHANGE UP (ref 150–400)
POTASSIUM SERPL-MCNC: 4.4 MMOL/L — SIGNIFICANT CHANGE UP (ref 3.5–5.3)
POTASSIUM SERPL-SCNC: 4.4 MMOL/L — SIGNIFICANT CHANGE UP (ref 3.5–5.3)
PROT SERPL-MCNC: 8.4 GM/DL — HIGH (ref 6–8.3)
RBC # BLD: 3.88 M/UL — LOW (ref 4.2–5.8)
RBC # FLD: 15.4 % — HIGH (ref 10.3–14.5)
SODIUM SERPL-SCNC: 147 MMOL/L — HIGH (ref 135–145)
WBC # BLD: 6.64 K/UL — SIGNIFICANT CHANGE UP (ref 3.8–10.5)
WBC # FLD AUTO: 6.64 K/UL — SIGNIFICANT CHANGE UP (ref 3.8–10.5)

## 2019-07-22 PROCEDURE — 93010 ELECTROCARDIOGRAM REPORT: CPT

## 2019-07-22 PROCEDURE — 99285 EMERGENCY DEPT VISIT HI MDM: CPT

## 2019-07-22 NOTE — ED PROVIDER NOTE - PROGRESS NOTE DETAILS
Scribe Alexa Bromberg for attending Dr. Valadez: Spoke to Dr. Claire for Dr. Lomas. Dr. Lomas note states purulent discharge from wound and wasn't safe in home environment. Dr. Claire doesn't have any further information. Will reconvene with wife. Scribe Alexa Bromberg for attending Dr. Valadez: Wife still states that he is not safe to come home cannot give me clear reasons but involves that he may be unsteady on feet. Reiterated to wife that he doesn't have wound infection or need to stay in hospital. Will board Pt in ER for social work in morning and discussion with Dr. Lomas about his concerns. s/o Osorio to f/u with SW and final dispo Patient evaluated by SW.  Pt without acute complaints other than question of needing placement.  Pt wheelchair bound, also has walker at home for PT.  SHAYAN Frank discussed with patient's wife.  They do not want to pay for placement.  They have accepted a home aide.  They want patient to come back to home, however do not want ambulance for transport.  Wife states will have patient picked up at 4PM today. Dawson Osorio D.O.

## 2019-07-22 NOTE — ED PROVIDER NOTE - OBJECTIVE STATEMENT
88 y/o male with a PMHx of hypercholesterolemia, HTN, meningioma presents to the ED for a wound check on the side of his right upper leg. Pt has no other complaints at this time. Pt was previously at [facility] for scrotal gangrene and discharged Thursday 7/18/19. PCP: Dr. Lomas

## 2019-07-22 NOTE — ED PROVIDER NOTE - GENITOURINARY, MLM
scrotal graft healthy and well appearing, skin of lateral thigh of graph site excoriated but with out any erythema induration or drainage

## 2019-07-22 NOTE — ED ADULT NURSE NOTE - OBJECTIVE STATEMENT
Patient presents to ED complaining of wound check. Patient complaining of wound to lateral R thigh. Patient saw PCP this morning sent to ED for further evaluation for infection of leg. Patient states he had a skin graft from area. Patient recently discharged from [facility] Thursday for DVT and gangrene of scrotum. Patient denies fever, chills, CP, SOB, N/V. Patient states he has had diarrhea x 2 days. A&0x3. Patient bed bound

## 2019-07-22 NOTE — ED PROVIDER NOTE - PMH
Cervical spondylosis with myelopathy and radiculopathy    Hypercholesterolemia    Hypertension    Meningioma

## 2019-07-23 VITALS
HEART RATE: 60 BPM | DIASTOLIC BLOOD PRESSURE: 83 MMHG | OXYGEN SATURATION: 100 % | SYSTOLIC BLOOD PRESSURE: 143 MMHG | TEMPERATURE: 98 F | RESPIRATION RATE: 17 BRPM

## 2019-07-23 NOTE — ED ADULT NURSE REASSESSMENT NOTE - NS ED NURSE REASSESS COMMENT FT1
Received pt from Jessica Yarbrough RN.  Pt currently comfortable with no complaints. Pt awaiting SW at this time.  VSS, safety maintained

## 2019-07-23 NOTE — ED ADULT NURSE REASSESSMENT NOTE - NS ED NURSE REASSESS COMMENT FT1
Dressing applied to patients right thigh.  Dressing currently clean, dry and intact.  No complaint from pt, currently eating lunch and awaiting ride back home. Dressing applied to patients right thigh.  Xerofoam and ABD pads applied to right thigh.  Dressing currently clean, dry and intact.  No complaint from pt, currently eating lunch and awaiting ride back home.

## 2019-07-26 NOTE — DIETITIAN INITIAL EVALUATION ADULT. - NS AS NUTRI INTERV FEED ASSISTANCE
Salicylate, Serum <7.6 0.0 - 30.0 mg/dL    TCA Scrn NEGATIVE Cutoff:300 ng/mL   Urine Drug Screen   Result Value Ref Range    Amphetamine Screen, Urine NOT DETECTED Negative <1000 ng/mL    Barbiturate Screen, Ur NOT DETECTED Negative < 200 ng/mL    Benzodiazepine Screen, Urine NOT DETECTED Negative < 200 ng/mL    Cannabinoid Scrn, Ur POSITIVE (A) Negative < 50ng/mL    Cocaine Metabolite Screen, Urine NOT DETECTED Negative < 300 ng/mL    Opiate Scrn, Ur NOT DETECTED Negative < 300ng/mL    PCP Screen, Urine NOT DETECTED Negative < 25 ng/mL    Methadone Screen, Urine NOT DETECTED Negative <300 ng/mL    Propoxyphene Scrn, Ur NOT DETECTED Negative <300 ng/mL       RADIOLOGY:  Interpreted by Radiologist.  No orders to display     ------------------------- NURSING NOTES AND VITALS REVIEWED ---------------------------   The nursing notes within the ED encounter and vital signs as below have been reviewed by myself. BP (!) 161/100   Pulse 108   Temp 99.2 °F (37.3 °C)   Resp 20   Ht 5' 9\" (1.753 m)   Wt 175 lb (79.4 kg)   SpO2 98%   BMI 25.84 kg/m²   Oxygen Saturation Interpretation: Normal    The patients available past medical records and past encounters were reviewed. ------------------------------ ED COURSE/MEDICAL DECISION MAKING----------------------  Medications - No data to display    Medical Decision Making:    Patient arrived in the ED for homicidal ideation. Patient has been off of his medications for the past 6 months and has a hx of bipolar disorder and schizophrenia. Patient admitted to smoking marijuana today. Lab work ordered and consult to social work placed. Re-Evaluations:           Re-evaluation. Patients symptoms show no change    Consultations:             Social Work    Counseling: The emergency provider has spoken with the patient and discussed todays results, in addition to providing specific details for the plan of care and counseling regarding the diagnosis and prognosis. Feeding Assistance/Meal set -up/Menu selection assistance/PRN

## 2019-07-31 ENCOUNTER — RECORD ABSTRACTING (OUTPATIENT)
Age: 84
End: 2019-07-31

## 2019-07-31 DIAGNOSIS — Z87.891 PERSONAL HISTORY OF NICOTINE DEPENDENCE: ICD-10-CM

## 2019-07-31 DIAGNOSIS — Z87.438 PERSONAL HISTORY OF OTHER DISEASES OF MALE GENITAL ORGANS: ICD-10-CM

## 2019-07-31 RX ORDER — LEVOTHYROXINE SODIUM 25 UG/1
25 TABLET ORAL DAILY
Refills: 0 | Status: ACTIVE | COMMUNITY
Start: 2019-07-31

## 2019-07-31 RX ORDER — APIXABAN 5 MG/1
5 TABLET, FILM COATED ORAL
Qty: 180 | Refills: 3 | Status: ACTIVE | COMMUNITY
Start: 2019-07-31

## 2019-08-02 PROBLEM — I10 HYPERTENSION, UNSPECIFIED TYPE: Status: ACTIVE | Noted: 2019-07-31

## 2019-08-02 PROBLEM — E03.9 HYPOTHYROIDISM, UNSPECIFIED TYPE: Status: ACTIVE | Noted: 2019-07-31

## 2019-08-02 PROBLEM — I48.91 ATRIAL FIBRILLATION, UNSPECIFIED TYPE: Status: ACTIVE | Noted: 2019-07-31

## 2019-08-02 PROBLEM — E78.00 HIGH BLOOD CHOLESTEROL: Status: ACTIVE | Noted: 2019-07-31

## 2019-08-05 ENCOUNTER — APPOINTMENT (OUTPATIENT)
Dept: HEMATOLOGY ONCOLOGY | Facility: CLINIC | Age: 84
End: 2019-08-05
Payer: MEDICARE

## 2019-08-05 VITALS
RESPIRATION RATE: 16 BRPM | TEMPERATURE: 97.4 F | DIASTOLIC BLOOD PRESSURE: 69 MMHG | HEART RATE: 79 BPM | SYSTOLIC BLOOD PRESSURE: 150 MMHG | HEIGHT: 68 IN

## 2019-08-05 DIAGNOSIS — E78.00 PURE HYPERCHOLESTEROLEMIA, UNSPECIFIED: ICD-10-CM

## 2019-08-05 DIAGNOSIS — C85.90 NON-HODGKIN LYMPHOMA, UNSPECIFIED, UNSPECIFIED SITE: ICD-10-CM

## 2019-08-05 DIAGNOSIS — I48.91 UNSPECIFIED ATRIAL FIBRILLATION: ICD-10-CM

## 2019-08-05 DIAGNOSIS — I10 ESSENTIAL (PRIMARY) HYPERTENSION: ICD-10-CM

## 2019-08-05 DIAGNOSIS — E03.9 HYPOTHYROIDISM, UNSPECIFIED: ICD-10-CM

## 2019-08-05 PROCEDURE — 99214 OFFICE O/P EST MOD 30 MIN: CPT

## 2019-08-05 RX ORDER — AMLODIPINE BESYLATE 5 MG/1
5 TABLET ORAL DAILY
Refills: 0 | Status: DISCONTINUED | COMMUNITY
Start: 2019-07-31 | End: 2019-08-05

## 2019-08-05 RX ORDER — FUROSEMIDE 20 MG/1
20 TABLET ORAL DAILY
Qty: 90 | Refills: 1 | Status: DISCONTINUED | COMMUNITY
Start: 2019-07-31 | End: 2019-08-05

## 2019-08-05 RX ORDER — LISINOPRIL 40 MG/1
40 TABLET ORAL DAILY
Refills: 0 | Status: DISCONTINUED | COMMUNITY
Start: 2019-07-31 | End: 2019-08-05

## 2019-08-05 RX ORDER — SIMVASTATIN 10 MG/1
10 TABLET, FILM COATED ORAL DAILY
Refills: 0 | Status: DISCONTINUED | COMMUNITY
Start: 2019-07-31 | End: 2019-08-05

## 2019-08-05 RX ORDER — METOPROLOL SUCCINATE 100 MG/1
100 TABLET, EXTENDED RELEASE ORAL DAILY
Refills: 0 | Status: DISCONTINUED | COMMUNITY
Start: 2019-07-31 | End: 2019-08-05

## 2019-08-05 NOTE — REVIEW OF SYSTEMS
[Easy Bruising] : a tendency for easy bruising [Incontinence] : incontinence [Negative] : Neurological [FreeTextEntry9] : generalized weakness - not new

## 2019-08-05 NOTE — CONSULT LETTER
[Consult Letter:] : I had the pleasure of evaluating your patient, [unfilled]. [Dear  ___] : Dear  [unfilled], [Please see my note below.] : Please see my note below. [Referral Closing:] : Thank you very much for seeing this patient.  If you have any questions, please do not hesitate to contact me. [Sincerely,] : Sincerely, [FreeTextEntry2] : Dr Camejo Linker [FreeTextEntry3] : Ann Marin

## 2019-08-05 NOTE — HISTORY OF PRESENT ILLNESS
[de-identified] : Patient seen in a consultation during hospitalization at  in [facility] 2019.\par Frail elderly admitted in March 2019 with Teetee gangrene of groin- prolonged hospitalization, multiple complications. \par While in hospital CT scan of abdomen  was done and showed unsuspected ~ 7 cm soft tissue mass surrounding aorta and 3.5 cm mass infiltrating left kidney. 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 \par Patient was discharged to rehab and did not come back for follow up.\par \par SAw his PCP Dr Lomas recently who advised to follow up with oncology.\par \par Frail, progressively weaker in last 1-2 years. Mostly in wheelchair. Overall per friend and his wife- he is stable or even little better  than in April/ May . denies pain, fevers. denies any GI complaints. He does not want to go to doctors - he wants to stay home and watch TV.\par He lives with wife. His friend comes every day and helps.

## 2019-08-05 NOTE — ASSESSMENT
[FreeTextEntry1] : 89 y/o male frail elderly in wheelchair with multiple medical problems. Diagnosed with low grade NHL  favor marginal zone- involving upper abdominal chary mass and extension to left kidney. \par Patient is asymptomatic from his lymphoma. \par Due to his advanced age/ very frail condition/ poor Performance status / comorbidities I do not recommend any treatment at this point. It will only have negative impact on quality of his life without any meaningful benefit.\par Should his condition deteriorate - he will be a candidate for supportive care/ hospice.\par \par D/w patient , his wife and friend.\par \par

## 2019-08-05 NOTE — REASON FOR VISIT
[Follow-Up Visit] : a follow-up visit for [FreeTextEntry2] : low grade lymphoma, s/p retroperitoneal  mass biopsy here for follow up

## 2019-08-05 NOTE — PHYSICAL EXAM
[Capable of only limited self care, confined to bed or chair more than 50% of waking hours] : Status 3- Capable of only limited self care, confined to bed or chair more than 50% of waking hours [Normal] : clear to auscultation bilaterally, no dullness, no wheezing [de-identified] : frail elderly male in wheelchair  [de-identified] : irregular  [de-identified] : no leg edema  [de-identified] : soft and nontender, no overt masses palpable  [de-identified] : cellulitis of skin donor graft site ; skin with ecchymoses forearms  [de-identified] : awake alert, answers questions, wants to go home

## 2020-01-02 NOTE — PHYSICAL THERAPY INITIAL EVALUATION ADULT - LEVEL OF INDEPENDENCE: SIT/SUPINE, REHAB EVAL
WOUND CARE CENTER PROGRESS NOTE     HISTORY OF PRESENT ILLNESS:  The patient is a 77-year-old woman referred by Dr. Allyssa Gutiérrez regarding ulceration, left lower leg.  The patient has history of ulceration of the lower leg many years ago, treated with Unna boots.  She saw Dr. Jed Reed about a month ago because of development of ulceration of the lower leg on the left and has been treated with Unna boots.       Her left leg ulcer had healed, but then returned.  She has been using 20-30 mm  HG stockings.       She had left leg healing doocumented on 12/5/2019.  She has had return of ulcers with skin rash.     The patient has been clinically diagnosed as having venous ulcers, but the patient did have on 07/17/2019, a venous duplex scan at [facility], which did not show any evidence of reflux in the superficial vein system or deep vein system in the left lower extremity.     The patient does have history at times of leg edema.  She does take Lasix 80 mg per day.  Increased to 160 mg per day,  Metolazone added in early November 2019.  She reports that does produce a good diuresis.  She, by her description, does not drink excessive amounts of fluid.        The patient has history of gout, hypothyroidism, osteoporosis, reflex sympathetic dystrophy of lower extremity, fatty liver infiltration with bridging fibrosis followed at [facility], hypercholesterolemia, bifascicular heart block, hypertension, thrombocytopenia, degenerative disk disease, diabetes mellitus.  The patient has never smoked. Zane Rhodes does not use alcohol.     The patient does have prior history of left femur fracture at age 12 and left tibial fracture in 200.  Both were related to falls.     The patient reports to have generally good control of blood sugar.  She mostly follows a diabetic diet.  She is followed by endocrinologist. John Villanueva A1c has been around 6.     The patient denies history of MI, anginal chest pain, or coronary intervention.  She denies shortness of breath at rest or with exertion.  She uses a walker to assist in walking related to balance.  She is able to sleep lying down at night in bed.     Culture taken on 9/5/2019 grew MRSA.  The patient received oral doxycycline.      She had healing of the ulcer and was discharged from the [facility] on 9/26/2019, but ulcer and erythema recurred.     Completed second course of Doxycycline ordered on 11/7/2019.     Current dressing:  Unna boot left leg     PHYSICAL EXAMINATION:  GENERAL:  On examination, the patient is an alert woman, in no acute distress.  She is significantly obese.     Examination of right lower extremity reveals  an elastic stocking in place on the right below-knee level.  There is trace pitting edema in right lower leg.     Left lower extremity has trace pitting edema. Yung Fantasma is a 2+ left dorsalis pedis pulse.  No ulcers present. Minimal; local erythema. The patien twill use 20-30 mm HG stockings bilaterally. Instructed on use.     I recommended the patient strictly follow a diabetic diet to assist in control of blood sugar.     I encouraged the patient to continue compliance with diuretics and to drink a moderate amount of fluids with meals and only small amounts between meals.     The patient by ultrasound testing does not have venous reflux and would not require any type of venous intervention.     No follow up appointment needed.                 FINAL DIAGNOSES:  Nonpressure ulcers left lower leg, healed;  leg edema, diabetes. .     L97.921  R60.0, L03. [address] Amadeo Santamaria MD moderate assist (50% patients effort)

## 2020-03-11 NOTE — ED ADULT TRIAGE NOTE - DIRECT TO ROOM CARE INITIATED:
Occupational Therapy:    Physical Therapy:    Speech Language Pathology: Individual: 45 minutes.    Signed by: Loly Estrada, Supervisor     22-Jul-2019 15:42

## 2020-05-07 NOTE — ED ADULT NURSE NOTE - NSFALLRSKINDICATORS_ED_ALL_ED
Returned call.  Mom not sure when she received VM from HARVINDER Fu.  Wanted to make sure everything was sent to [facility] and f/u about getting a letter from Dr. Aguilar for surgery.  Dr. Aguilar out until tomorrow.  Will get clearance letter sent upon his return.  She verbalized understanding.   no

## 2021-04-02 NOTE — PROVIDER CONTACT NOTE (OTHER) - BACKGROUND
Occupational Therapy:    Physical Therapy: Individual: 90 minutes.    Speech Language Pathology:    Signed by: Barry Santamaria PT     pt had wound vac placed 3/27/2019

## 2022-06-07 NOTE — PHYSICAL THERAPY INITIAL EVALUATION ADULT - ADDITIONAL COMMENTS
I reviewed the H&P, I examined the patient, and there are no changes in the patient's condition.    COVID negative  Pregnancy test negative, pt is on combined hormonal contraceptive pills (CHCs)  S/p CBC, T&S, normal H/H    I reviewed again with Jairo Lange the risks, benefits, and alternatives of robotic-assisted total laparoscopic hysterectomy, bilateral salpingectomy, cystoscopy, and possible mini-laparotomy. See H&P for further details. She did not have any further questions. She verbalized an understanding and gave her informed consent. Written consent signed.     Plan for same day discharge if meeting criteria. Reviewed discharge instructions in detail including wound management, pain management, and activity restrictions. All questions were answered. She verbalized an understanding and was in agreement.     Hamida Guerrero MD      
See IE 3/15/19  Pt was anxious and needs a lot of encouragement.
The pt reports that he was normally independent with moving around using a RW at home but has been having worsening pain over his left knee with increasing frequency of falls, 4 in the past year reportedly. The pt reports that he has several steps at home with unilateral rails. The pt reports that he feels like his feet "slip out" from under neath him a lot.
pt is a household amb, only amb short distances in the home with assistance.
The pt reports that he was normally independent with moving around using a RW at home but has been having worsening pain over his left knee with increasing frequency of falls, 4 in the past year reportedly. The pt reports that he has several steps at home with unilateral rails. The pt reports that he feels like his feet "slip out" from under neath him a lot. Household Ambulator.

## 2023-08-24 NOTE — ED PROVIDER NOTE - CLINICAL SUMMARY MEDICAL DECISION MAKING FREE TEXT BOX
note.    --Krystina Sawyer, APRN - CNP Pt reportedly sent to ER for infected wound of lateral thigh not appreciated at this time. Pt nor wife able to give any further complaint and unable to get in touch with PMD at this time. Will board in ER over night for social wrk consult in the morning because wife says he's not safe to be home and cannot pick him up anyway.
